# Patient Record
Sex: MALE | Race: WHITE | NOT HISPANIC OR LATINO | Employment: FULL TIME | URBAN - METROPOLITAN AREA
[De-identification: names, ages, dates, MRNs, and addresses within clinical notes are randomized per-mention and may not be internally consistent; named-entity substitution may affect disease eponyms.]

---

## 2017-01-31 ENCOUNTER — HOSPITAL ENCOUNTER (EMERGENCY)
Facility: CLINIC | Age: 22
Discharge: HOME | End: 2017-01-31
Attending: EMERGENCY MEDICINE | Admitting: EMERGENCY MEDICINE
Payer: COMMERCIAL

## 2017-01-31 VITALS
OXYGEN SATURATION: 99 % | DIASTOLIC BLOOD PRESSURE: 78 MMHG | RESPIRATION RATE: 16 BRPM | TEMPERATURE: 99.1 F | SYSTOLIC BLOOD PRESSURE: 127 MMHG

## 2017-01-31 DIAGNOSIS — R10.32 ABDOMINAL PAIN, LEFT LOWER QUADRANT: ICD-10-CM

## 2017-01-31 LAB
ALBUMIN SERPL-MCNC: 4.4 G/DL (ref 3.4–5)
ALBUMIN UR-MCNC: 10 MG/DL
ALP SERPL-CCNC: 61 U/L (ref 40–150)
ALT SERPL W P-5'-P-CCNC: 17 U/L (ref 0–70)
AMPHETAMINES UR QL SCN: ABNORMAL
ANION GAP SERPL CALCULATED.3IONS-SCNC: 6 MMOL/L (ref 3–14)
APPEARANCE UR: CLEAR
AST SERPL W P-5'-P-CCNC: 14 U/L (ref 0–45)
BARBITURATES UR QL: ABNORMAL
BASOPHILS # BLD AUTO: 0 10E9/L (ref 0–0.2)
BASOPHILS NFR BLD AUTO: 0.5 %
BENZODIAZ UR QL: ABNORMAL
BILIRUB SERPL-MCNC: 0.6 MG/DL (ref 0.2–1.3)
BILIRUB UR QL STRIP: NEGATIVE
BUN SERPL-MCNC: 6 MG/DL (ref 7–30)
CALCIUM SERPL-MCNC: 9.1 MG/DL (ref 8.5–10.1)
CANNABINOIDS UR QL SCN: ABNORMAL
CHLORIDE SERPL-SCNC: 106 MMOL/L (ref 94–109)
CO2 SERPL-SCNC: 26 MMOL/L (ref 20–32)
COCAINE UR QL: ABNORMAL
COLOR UR AUTO: YELLOW
CREAT SERPL-MCNC: 0.86 MG/DL (ref 0.66–1.25)
DIFFERENTIAL METHOD BLD: ABNORMAL
EOSINOPHIL # BLD AUTO: 0 10E9/L (ref 0–0.7)
EOSINOPHIL NFR BLD AUTO: 0.7 %
ERYTHROCYTE [DISTWIDTH] IN BLOOD BY AUTOMATED COUNT: 12.7 % (ref 10–15)
GFR SERPL CREATININE-BSD FRML MDRD: ABNORMAL ML/MIN/1.7M2
GLUCOSE SERPL-MCNC: 90 MG/DL (ref 70–99)
GLUCOSE UR STRIP-MCNC: NEGATIVE MG/DL
HCT VFR BLD AUTO: 41.6 % (ref 40–53)
HGB BLD-MCNC: 14.4 G/DL (ref 13.3–17.7)
HGB UR QL STRIP: ABNORMAL
IMM GRANULOCYTES # BLD: 0 10E9/L (ref 0–0.4)
IMM GRANULOCYTES NFR BLD: 0 %
KETONES UR STRIP-MCNC: 5 MG/DL
LEUKOCYTE ESTERASE UR QL STRIP: NEGATIVE
LYMPHOCYTES # BLD AUTO: 1.9 10E9/L (ref 0.8–5.3)
LYMPHOCYTES NFR BLD AUTO: 30.3 %
MCH RBC QN AUTO: 29.4 PG (ref 26.5–33)
MCHC RBC AUTO-ENTMCNC: 34.6 G/DL (ref 31.5–36.5)
MCV RBC AUTO: 85 FL (ref 78–100)
MONOCYTES # BLD AUTO: 1 10E9/L (ref 0–1.3)
MONOCYTES NFR BLD AUTO: 16.3 %
MUCOUS THREADS #/AREA URNS LPF: PRESENT /LPF
NEUTROPHILS # BLD AUTO: 3.2 10E9/L (ref 1.6–8.3)
NEUTROPHILS NFR BLD AUTO: 52.2 %
NITRATE UR QL: NEGATIVE
OPIATES UR QL SCN: ABNORMAL
PCP UR QL SCN: ABNORMAL
PH UR STRIP: 6.5 PH (ref 5–7)
PLATELET # BLD AUTO: 125 10E9/L (ref 150–450)
POTASSIUM SERPL-SCNC: 3.7 MMOL/L (ref 3.4–5.3)
PROT SERPL-MCNC: 7.4 G/DL (ref 6.8–8.8)
RBC # BLD AUTO: 4.9 10E12/L (ref 4.4–5.9)
RBC #/AREA URNS AUTO: 78 /HPF (ref 0–2)
SODIUM SERPL-SCNC: 138 MMOL/L (ref 133–144)
SP GR UR STRIP: 1.01 (ref 1–1.03)
URN SPEC COLLECT METH UR: ABNORMAL
UROBILINOGEN UR STRIP-MCNC: NORMAL MG/DL (ref 0–2)
WBC # BLD AUTO: 6.1 10E9/L (ref 4–11)
WBC #/AREA URNS AUTO: 1 /HPF (ref 0–2)

## 2017-01-31 PROCEDURE — 25000125 ZZHC RX 250: Performed by: EMERGENCY MEDICINE

## 2017-01-31 PROCEDURE — 87491 CHLMYD TRACH DNA AMP PROBE: CPT | Performed by: EMERGENCY MEDICINE

## 2017-01-31 PROCEDURE — 85025 COMPLETE CBC W/AUTO DIFF WBC: CPT | Performed by: EMERGENCY MEDICINE

## 2017-01-31 PROCEDURE — 81001 URINALYSIS AUTO W/SCOPE: CPT | Mod: XU | Performed by: EMERGENCY MEDICINE

## 2017-01-31 PROCEDURE — 99284 EMERGENCY DEPT VISIT MOD MDM: CPT | Mod: 25

## 2017-01-31 PROCEDURE — 87591 N.GONORRHOEAE DNA AMP PROB: CPT | Performed by: EMERGENCY MEDICINE

## 2017-01-31 PROCEDURE — 80053 COMPREHEN METABOLIC PANEL: CPT | Performed by: EMERGENCY MEDICINE

## 2017-01-31 PROCEDURE — 99285 EMERGENCY DEPT VISIT HI MDM: CPT | Performed by: EMERGENCY MEDICINE

## 2017-01-31 PROCEDURE — 96361 HYDRATE IV INFUSION ADD-ON: CPT

## 2017-01-31 PROCEDURE — 80307 DRUG TEST PRSMV CHEM ANLYZR: CPT | Performed by: EMERGENCY MEDICINE

## 2017-01-31 PROCEDURE — 96374 THER/PROPH/DIAG INJ IV PUSH: CPT

## 2017-01-31 PROCEDURE — 25000128 H RX IP 250 OP 636: Performed by: EMERGENCY MEDICINE

## 2017-01-31 RX ORDER — HYDROMORPHONE HYDROCHLORIDE 1 MG/ML
0.5 INJECTION, SOLUTION INTRAMUSCULAR; INTRAVENOUS; SUBCUTANEOUS EVERY 30 MIN PRN
Status: DISCONTINUED | OUTPATIENT
Start: 2017-01-31 | End: 2017-01-31 | Stop reason: HOSPADM

## 2017-01-31 RX ORDER — ONDANSETRON 2 MG/ML
4 INJECTION INTRAMUSCULAR; INTRAVENOUS
Status: DISCONTINUED | OUTPATIENT
Start: 2017-01-31 | End: 2017-01-31 | Stop reason: HOSPADM

## 2017-01-31 RX ORDER — KETOROLAC TROMETHAMINE 30 MG/ML
30 INJECTION, SOLUTION INTRAMUSCULAR; INTRAVENOUS ONCE
Status: COMPLETED | OUTPATIENT
Start: 2017-01-31 | End: 2017-01-31

## 2017-01-31 RX ADMIN — KETOROLAC TROMETHAMINE 30 MG: 30 INJECTION, SOLUTION INTRAMUSCULAR at 10:11

## 2017-01-31 RX ADMIN — SODIUM CHLORIDE 500 ML: 9 INJECTION, SOLUTION INTRAVENOUS at 10:10

## 2017-01-31 ASSESSMENT — ENCOUNTER SYMPTOMS
ABDOMINAL PAIN: 1
MUSCULOSKELETAL NEGATIVE: 1
HEMATOLOGIC/LYMPHATIC NEGATIVE: 1
CARDIOVASCULAR NEGATIVE: 1
EYES NEGATIVE: 1
NEUROLOGICAL NEGATIVE: 1
RESPIRATORY NEGATIVE: 1
HEMATURIA: 1
CONSTITUTIONAL NEGATIVE: 1
PSYCHIATRIC NEGATIVE: 1
ALLERGIC/IMMUNOLOGIC NEGATIVE: 1
ENDOCRINE NEGATIVE: 1

## 2017-01-31 NOTE — ED AVS SNAPSHOT
[facility]    [address] [facility] [facility] [address]    Phone:  [phone]    Fax:  [phone]                                       Hemanth Hurd   MRN: [id number]    Department:  [facility]   Date of Visit:  1/31/2017           After Visit Summary Signature Page     I have received my discharge instructions, and my questions have been answered. I have discussed any challenges I see with this plan with the nurse or doctor.    ..........................................................................................................................................  Patient/Patient Representative Signature      ..........................................................................................................................................  Patient Representative Print Name and Relationship to Patient    ..................................................               ................................................  Date                                            Time    ..........................................................................................................................................  Reviewed by Signature/Title    ...................................................              ..............................................  Date                                                            Time

## 2017-01-31 NOTE — ED AVS SNAPSHOT
[facility]    [address] [facility] [address]    Phone:  [phone]    Fax:  [phone]                                       Hemanth Hurd   MRN: [id number]    Department:  [facility]   Date of Visit:  1/31/2017           Patient Information     Date Of Birth          1995        Your diagnoses for this visit were:     Abdominal pain, left lower quadrant        You were seen by Bill Danielson MD.      Follow-up Information     Follow up with [facility].    Specialty:  EMERGENCY MEDICINE    Why:  You will be called by the ED follow-up  nurses if positive urine for infection.    Contact information:    [address] [place] [address] [id number] [phone]    Additional information:    The medical center is located at   [address] [place] [address] (between [facility] and   [address], [place] north   [facility]).        Follow up with [facility] In 3 days.    Specialty:  Urology    Why:  As needed, If symptoms worsen for follow-up for abdominal pain and blood in urine    Contact information:    [address] [place] [address] [phone]    Additional information:    The medical center is located at   [address] [place] [address] (between [facility] and   [address], [place] miles north   of [place]).        Follow up with [facility].    Specialty:  EMERGENCY MEDICINE    Why:  if fever, worsening pain, vomiting, new notice of blood in urine    Contact information:    [address] [place] [address] [phone]    Additional information:    The medical center is located at   [address] [place] [address] (between [facility] and   [address], four miles north   [facility]).        Discharge Instructions         * Abdominal Pain,Uncertain Cause [Male]  Based on your visit today, the exact cause of your abdominal  (stomach) pain is not clear. Your exam and tests do not indicate a dangerous cause at this time. However, the signs of a serious problem may take more time to appear. Although your exam was reassuring today, sometimes early in the course of many conditions, exam and lab tests can appear normal. Therefore, it is important for you to watch for any new symptoms or worsening of your condition.  Causes:  It may not be obvious what caused your symptoms. Pay attention to things that do seem to make your symptoms worse or better and discuss this with your doctor when you follow up.  Diagnosis:  The evaluation of abdominal pain in the emergency department may only require an exam by the doctor or it may include blood, urine or imaging studies, depending on many factors. Sometimes exams and tests can identify a cause but in many cases, a clear cause is not found. Further testing at follow up visits may help to suggest a clear diagnosis.  Home Care:    Rest as much as possible until your next exam.    Try to avoid any medications (unless otherwise directed by your doctor), foods, activities, or other factors that you may have contributed to your symptoms.    Try to eat foods that you know that you have tolerated well in the past. Certain diets may be recommended for some conditions that cause abdominal pain. However, since the cause of your symptoms may not be clear, discuss your diet more with your primary care provider or specialist for further recommendations.     Eating several small meals per day as opposed to 2 or 3 larger meals may help.    Monitor closely for anything that may make your symptoms worse or better. Pay close attention to symptoms below that may indicate worsening of your condition.  Follow Up And Precautions:  See your doctor or this facility as instructed (or sooner, if your symptoms are not improving). In some cases, you may need more testing.  Contact Your Doctor Or Seek Medical Attention  if any of  the following occur:    Pain is becoming worse    You are unable to take your medications because of too much vomiting    Swelling of the abdomen    Fever of 101 F (38.3 C) or higher, or as directed by your health care provider    Blood in vomit or bowel movements (dark red or black color)    Jaundice (yellow color of eyes and skin)    New onset of weakness, dizziness or fainting    New onset of chest, arm, back, neck or jaw pain    [id number] [facility], [address] [place] [address], [place] [address]. All rights reserved. This information is not intended as a substitute for professional medical care. Always follow your healthcare professional's instructions.          24 Hour Appointment Hotline       To make an appointment at any [facility], call [phone] ([phone]). If you don't have a family doctor or clinic, we will help you find one. [facility] clinics are conveniently located to serve the needs of you and your family.             Review of your medicines      Our records show that you are taking the medicines listed below. If these are incorrect, please call your family doctor or clinic.        Dose / Directions Last dose taken    ATIVAN PO   Dose:  0.5 mg        Take 0.5 mg by mouth every 6 hours as needed for anxiety   Refills:  0        EFFEXOR XR PO        Take by mouth daily (with breakfast)   Refills:  0                Procedures and tests performed during your visit     CBC with platelets differential    Chlamydia trachomatis PCR    Comprehensive metabolic panel    Drug abuse screen 77 urine (FL, RH, SH)    Neisseria gonorrhoea PCR    POC US RETROPERITONEAL LIMITED    UA reflex to Microscopic      Orders Needing Specimen Collection     None      Pending Results     Date and Time Order Name Status Description    1/31/2017 0951 Chlamydia trachomatis PCR In process     1/31/2017 0951 Neisseria gonorrhoea PCR In process     1/31/2017 0935 POC US RETROPERITONEAL LIMITED In process              Pending Culture Results     Date and Time Order Name Status Description    1/31/2017 0951 Chlamydia trachomatis PCR In process     1/31/2017 0951 Neisseria gonorrhoea PCR In process        Test Results from your hospital stay           1/31/2017 10:04 AM - Interface, Flexilab Results      Component Results     Component Value Ref Range & Units Status    Color Urine Yellow  Final    Appearance Urine Clear  Final    Glucose Urine Negative NEG mg/dL Final    Bilirubin Urine Negative NEG Final    Ketones Urine 5 (A) NEG mg/dL Final    Specific Gravity Urine 1.015 1.003 - 1.035 Final    Blood Urine Small (A) NEG Final    pH Urine 6.5 5.0 - 7.0 pH Final    Protein Albumin Urine 10 (A) NEG mg/dL Final    Urobilinogen mg/dL Normal 0.0 - 2.0 mg/dL Final    Nitrite Urine Negative NEG Final    Leukocyte Esterase Urine Negative NEG Final    Source Midstream Urine  Final    RBC Urine 78 (H) 0 - 2 /HPF Final    WBC Urine 1 0 - 2 /HPF Final    Mucous Urine Present (A) NEG /LPF Final         1/31/2017 10:40 AM - Interface, Flexilab Results      Component Results     Component Value Ref Range & Units Status    Sodium 138 133 - 144 mmol/L Final    Potassium 3.7 3.4 - 5.3 mmol/L Final    Chloride 106 94 - 109 mmol/L Final    Carbon Dioxide 26 20 - 32 mmol/L Final    Anion Gap 6 3 - 14 mmol/L Final    Glucose 90 70 - 99 mg/dL Final    Urea Nitrogen 6 (L) 7 - 30 mg/dL Final    Creatinine 0.86 0.66 - 1.25 mg/dL Final    GFR Estimate >90  Non  GFR Calc   >60 mL/min/1.7m2 Final    GFR Estimate If Black >90   GFR Calc   >60 mL/min/1.7m2 Final    Calcium 9.1 8.5 - 10.1 mg/dL Final    Bilirubin Total 0.6 0.2 - 1.3 mg/dL Final    Albumin 4.4 3.4 - 5.0 g/dL Final    Protein Total 7.4 6.8 - 8.8 g/dL Final    Alkaline Phosphatase 61 40 - 150 U/L Final    ALT 17 0 - 70 U/L Final    AST 14 0 - 45 U/L Final         1/31/2017 10:50 AM - Interface, Flexilab Results      Component Results     Component Value Ref Range  & Units Status    WBC 6.1 4.0 - 11.0 10e9/L Final    RBC Count 4.90 4.4 - 5.9 10e12/L Final    Hemoglobin 14.4 13.3 - 17.7 g/dL Final    Hematocrit 41.6 40.0 - 53.0 % Final    MCV 85 78 - 100 fl Final    MCH 29.4 26.5 - 33.0 pg Final    MCHC 34.6 31.5 - 36.5 g/dL Final    RDW 12.7 10.0 - 15.0 % Final    Platelet Count 125 (L) 150 - 450 10e9/L Final    Diff Method Automated Method  Final    % Neutrophils 52.2 % Final    % Lymphocytes 30.3 % Final    % Monocytes 16.3 % Final    % Eosinophils 0.7 % Final    % Basophils 0.5 % Final    % Immature Granulocytes 0.0 % Final    Absolute Neutrophil 3.2 1.6 - 8.3 10e9/L Final    Absolute Lymphocytes 1.9 0.8 - 5.3 10e9/L Final    Absolute Monocytes 1.0 0.0 - 1.3 10e9/L Final    Absolute Eosinophils 0.0 0.0 - 0.7 10e9/L Final    Absolute Basophils 0.0 0.0 - 0.2 10e9/L Final    Abs Immature Granulocytes 0.0 0 - 0.4 10e9/L Final         1/31/2017  9:35 AM - Service Account, [facility] [place]         1/31/2017  9:54 AM - Interface, Flexilab Results         1/31/2017  9:54 AM - Interface, Flexilab Results         1/31/2017 10:14 AM - Interface, Flexilab Results      Component Results     Component Value Ref Range & Units Status    Amphetamine Qual Urine  NEG Final    Negative   Cutoff for a negative amphetamine is 500 ng/mL or less.      Barbiturates Qual Urine  NEG Final    Negative   Cutoff for a negative barbiturate is 200 ng/mL or less.      Benzodiazepine Qual Urine  NEG Final    Negative   Cutoff for a negative benzodiazepine is 200 ng/mL or less.      Cannabinoids Qual Urine  NEG Final    Negative   Cutoff for a negative cannabinoid is 50 ng/mL or less.      Cocaine Qual Urine  NEG Final    Negative   Cutoff for a negative cocaine is 300 ng/mL or less.      Opiates Qualitative Urine  NEG Final    Positive   Cutoff for a positive opiate is greater than 300 ng/mL. This is an unconfirmed   screening result to be used for medical purposes only.   (A)    PCP Qual Urine  NEG Final     "Negative   Cutoff for a negative PCP is 25 ng/mL or less.                  Thank you for choosing [facility]       Thank you for choosing [facility] for your care. Our goal is always to provide you with excellent care. Hearing back from our patients is one way we can continue to improve our services. Please take a few minutes to complete the written survey that you may receive in the mail after you visit with us. Thank you!        [facility] Information     [employer] lets you send messages to your doctor, view your test results, renew your prescriptions, schedule appointments and more. To sign up, go to [email or web address] . Click on \"Log in\" on the left side of the screen, which will take you to the Welcome page. Then click on \"Sign up Now\" on the right side of the page.     You will be asked to enter the access code listed below, as well as some personal information. Please follow the directions to create your username and password.     Your access code is: [email or web address]  Expires: 2017 12:36 PM     Your access code will  in 90 days. If you need help or a new code, please call your [facility] clinic or [phone].        Care EveryWhere ID     This is your Care EveryWhere ID. This could be used by other organizations to access your [facility] medical records  [id number]        After Visit Summary       This is your record. Keep this with you and show to your community pharmacist(s) and doctor(s) at your next visit.                  "

## 2017-01-31 NOTE — ED NOTES
"Pt not satisfied with his care, verbalized by angry words. Stating that \"you guys did nothing for my pain.\" discharged with instructions.   "

## 2017-01-31 NOTE — DISCHARGE INSTRUCTIONS
* Abdominal Pain,Uncertain Cause [Male]  Based on your visit today, the exact cause of your abdominal (stomach) pain is not clear. Your exam and tests do not indicate a dangerous cause at this time. However, the signs of a serious problem may take more time to appear. Although your exam was reassuring today, sometimes early in the course of many conditions, exam and lab tests can appear normal. Therefore, it is important for you to watch for any new symptoms or worsening of your condition.  Causes:  It may not be obvious what caused your symptoms. Pay attention to things that do seem to make your symptoms worse or better and discuss this with your doctor when you follow up.  Diagnosis:  The evaluation of abdominal pain in the emergency department may only require an exam by the doctor or it may include blood, urine or imaging studies, depending on many factors. Sometimes exams and tests can identify a cause but in many cases, a clear cause is not found. Further testing at follow up visits may help to suggest a clear diagnosis.  Home Care:    Rest as much as possible until your next exam.    Try to avoid any medications (unless otherwise directed by your doctor), foods, activities, or other factors that you may have contributed to your symptoms.    Try to eat foods that you know that you have tolerated well in the past. Certain diets may be recommended for some conditions that cause abdominal pain. However, since the cause of your symptoms may not be clear, discuss your diet more with your primary care provider or specialist for further recommendations.     Eating several small meals per day as opposed to 2 or 3 larger meals may help.    Monitor closely for anything that may make your symptoms worse or better. Pay close attention to symptoms below that may indicate worsening of your condition.  Follow Up And Precautions:  See your doctor or this facility as instructed (or sooner, if your symptoms are not  improving). In some cases, you may need more testing.  Contact Your Doctor Or Seek Medical Attention  if any of the following occur:    Pain is becoming worse    You are unable to take your medications because of too much vomiting    Swelling of the abdomen    Fever of 101 F (38.3 C) or higher, or as directed by your health care provider    Blood in vomit or bowel movements (dark red or black color)    Jaundice (yellow color of eyes and skin)    New onset of weakness, dizziness or fainting    New onset of chest, arm, back, neck or jaw pain    [phone] [address] [address]. All rights reserved. This information is not intended as a substitute for professional medical care. Always follow your healthcare professional's instructions.

## 2017-01-31 NOTE — ED PROVIDER NOTES
History     Chief Complaint   Patient presents with     Abdominal Pain     Pt started having abdominal pain last evening at 9 pm.  Seen in [facility] during the night, they thought he may have passed a kidney stone.  After meds wore off, pain returned.  Nausea when pain is severe.  Denies diarrhea.  Having blood in urine during the night.  No void this morning      HPI  Hemanth Hurd is a 21 year old male who presented for evaluation for abdominal pain and discomfort.  Patient was seen at the [facility] last night and diagnosed with a probable stone based on hematuria and abdominal pain.  Patient did have CT imaging we did not show discrete stone or hydronephrosis.  There was mild sigmoid diverticulosis without diverticulitis on CT imaging and there was no hydroureter  noted.  patient reports he is doing well after he was discharged but returns today with worsening pain and discomfort because he feels the pain medications wore off.  Nursing notes indicates patient reported he had difficulty voiding with concern for urinary retention but on my exam and during my HPI patient reports he's had no difficulty voiding.  He reports no prior history of sexually transmitted infections.  He reports no personal history of kidney stone but a family history of kidney stones.   He reports he sexually active with women.  He has one single sexual partner.  He has not been sexually active for about 3 months.  He reports no penile discharge.  No back pain or flank pain.  Pain is predominantly over the left lower abdomen.  Because of concern for worsening pain with recent evaluation at [facility] and presumed diagnosis of possible passing of a small urinary stone with hematuria who presents to the emergency department here for further evaluation and care.      Social history: Lives in [place], [place]. Here in ED alone by private car. Smoker- 1ppd. Works at a car dealership. Student at [employer]    Past Medical  history: no active medical problems.    Medications:  No current facility-administered medications for this encounter.     Current Outpatient Prescriptions   Medication     Venlafaxine HCl (EFFEXOR XR PO)     LORazepam (ATIVAN PO)     Allergies:   No Known Allergies  I have reviewed the Medications, Allergies, Past Medical and Surgical History, and Social History in the Epic system.    Review of Systems   Constitutional: Negative.    HENT: Negative.    Eyes: Negative.    Respiratory: Negative.    Cardiovascular: Negative.    Gastrointestinal: Positive for abdominal pain.   Endocrine: Negative.    Genitourinary: Positive for hematuria.   Musculoskeletal: Negative.    Skin: Negative.    Allergic/Immunologic: Negative.    Neurological: Negative.    Hematological: Negative.    Psychiatric/Behavioral: Negative.        Physical Exam   BP: (!) 135/92 mmHg  Heart Rate: 104  Temp: 99.1  F (37.3  C)  Resp: 16  SpO2: 99 %  Physical Exam   Constitutional: He is oriented to person, place, and time. He appears well-developed and well-nourished. No distress.   HENT:   Head: Normocephalic and atraumatic.   Eyes: Conjunctivae and EOM are normal. Pupils are equal, round, and reactive to light. Right eye exhibits no discharge. Left eye exhibits no discharge. No scleral icterus.   Neck: Normal range of motion. Neck supple. No JVD present. No tracheal deviation present. No thyromegaly present.   Cardiovascular: Normal rate and regular rhythm.  Exam reveals no gallop and no friction rub.    No murmur heard.  Pulmonary/Chest: Effort normal and breath sounds normal. No stridor. No respiratory distress. He has no wheezes. He has no rales. He exhibits no tenderness.   Abdominal: Soft. He exhibits no mass. There is tenderness in the left lower quadrant. There is no rebound and no guarding.       Lymphadenopathy:     He has no cervical adenopathy.   Neurological: He is alert and oriented to person, place, and time. He displays normal reflexes.  No cranial nerve deficit. He exhibits normal muscle tone. Coordination normal.   Skin: No rash noted. He is not diaphoretic. No erythema. No pallor.   Psychiatric: He has a normal mood and affect. His behavior is normal. Judgment and thought content normal.       ED Course   Procedures          Critical Care time:  none                 ED medications:  Medications   0.9% sodium chloride BOLUS (0 mLs Intravenous Stopped 1/31/17 1219)   ketorolac (TORADOL) injection 30 mg (30 mg Intravenous Given 1/31/17 1011)     ED labs and imaging:  Results for orders placed or performed during the hospital encounter of 01/31/17   UA reflex to Microscopic   Result Value Ref Range    Color Urine Yellow     Appearance Urine Clear     Glucose Urine Negative NEG mg/dL    Bilirubin Urine Negative NEG    Ketones Urine 5 (A) NEG mg/dL    Specific Gravity Urine 1.015 1.003 - 1.035    Blood Urine Small (A) NEG    pH Urine 6.5 5.0 - 7.0 pH    Protein Albumin Urine 10 (A) NEG mg/dL    Urobilinogen mg/dL Normal 0.0 - 2.0 mg/dL    Nitrite Urine Negative NEG    Leukocyte Esterase Urine Negative NEG    Source Midstream Urine     RBC Urine 78 (H) 0 - 2 /HPF    WBC Urine 1 0 - 2 /HPF    Mucous Urine Present (A) NEG /LPF   Comprehensive metabolic panel   Result Value Ref Range    Sodium 138 133 - 144 mmol/L    Potassium 3.7 3.4 - 5.3 mmol/L    Chloride 106 94 - 109 mmol/L    Carbon Dioxide 26 20 - 32 mmol/L    Anion Gap 6 3 - 14 mmol/L    Glucose 90 70 - 99 mg/dL    Urea Nitrogen 6 (L) 7 - 30 mg/dL    Creatinine 0.86 0.66 - 1.25 mg/dL    GFR Estimate >90  Non  GFR Calc   >60 mL/min/1.7m2    GFR Estimate If Black >90   GFR Calc   >60 mL/min/1.7m2    Calcium 9.1 8.5 - 10.1 mg/dL    Bilirubin Total 0.6 0.2 - 1.3 mg/dL    Albumin 4.4 3.4 - 5.0 g/dL    Protein Total 7.4 6.8 - 8.8 g/dL    Alkaline Phosphatase 61 40 - 150 U/L    ALT 17 0 - 70 U/L    AST 14 0 - 45 U/L   CBC with platelets differential   Result Value Ref Range     WBC 6.1 4.0 - 11.0 10e9/L    RBC Count 4.90 4.4 - 5.9 10e12/L    Hemoglobin 14.4 13.3 - 17.7 g/dL    Hematocrit 41.6 40.0 - 53.0 %    MCV 85 78 - 100 fl    MCH 29.4 26.5 - 33.0 pg    MCHC 34.6 31.5 - 36.5 g/dL    RDW 12.7 10.0 - 15.0 %    Platelet Count 125 (L) 150 - 450 10e9/L    Diff Method Automated Method     % Neutrophils 52.2 %    % Lymphocytes 30.3 %    % Monocytes 16.3 %    % Eosinophils 0.7 %    % Basophils 0.5 %    % Immature Granulocytes 0.0 %    Absolute Neutrophil 3.2 1.6 - 8.3 10e9/L    Absolute Lymphocytes 1.9 0.8 - 5.3 10e9/L    Absolute Monocytes 1.0 0.0 - 1.3 10e9/L    Absolute Eosinophils 0.0 0.0 - 0.7 10e9/L    Absolute Basophils 0.0 0.0 - 0.2 10e9/L    Abs Immature Granulocytes 0.0 0 - 0.4 10e9/L   Drug abuse screen 77 urine (FL, RH, SH)   Result Value Ref Range    Amphetamine Qual Urine  NEG     Negative   Cutoff for a negative amphetamine is 500 ng/mL or less.      Barbiturates Qual Urine  NEG     Negative   Cutoff for a negative barbiturate is 200 ng/mL or less.      Benzodiazepine Qual Urine  NEG     Negative   Cutoff for a negative benzodiazepine is 200 ng/mL or less.      Cannabinoids Qual Urine  NEG     Negative   Cutoff for a negative cannabinoid is 50 ng/mL or less.      Cocaine Qual Urine  NEG     Negative   Cutoff for a negative cocaine is 300 ng/mL or less.      Opiates Qualitative Urine (A) NEG     Positive   Cutoff for a positive opiate is greater than 300 ng/mL. This is an unconfirmed   screening result to be used for medical purposes only.      PCP Qual Urine  NEG     Negative   Cutoff for a negative PCP is 25 ng/mL or less.         ED Vitals:  Filed Vitals:    01/31/17 1130 01/31/17 1145 01/31/17 1200 01/31/17 1215   BP: 127/82  127/78    Temp:       TempSrc:       Resp:       SpO2: 96% 98%  99%     Prior or recent diagnostic imaging and work-up:  CONCLUSION:    1.  No hydronephrosis or hydroureter.    2.  No renal or distal obstructing ureteral calculi.    3.  No CT evidence  of appendicitis.    4.  No focal inflammatory change.    5.  Mild sigmoid diverticulosis.                   CT Abdomen Pelvis Without Oral Without IV Contrast (01/31/2017 5:27 AM)   Narrative   CT ABDOMEN PELVIS WO ORAL WO IV CONTRAST    1/31/2017 5:27 AM        INDICATION: Abdominal pain. Hematuria.    TECHNIQUE: Routine CT abdomen and pelvis without oral or IV contrast. Multiplanar reformation images (MPR). Dose reduction techniques were used.    COMPARISON: None.        FINDINGS:    LUNG BASES: Negative.        ABDOMEN: The liver, spleen and pancreas unremarkable. Stomach partially filled with gastric contents. Gallbladder visualized. No adrenal nodule. Aorta normal in caliber. No hydronephrosis. No renal lithiasis. No proximal hydroureter.        PELVIS: The appendix is partially air-filled. The distal appendix contains intraluminal hyperdensity compatible with ingested hyperdense material. No focal inflammatory change. Bladder is partially filled measuring 7.9 x 8.6 cm. No distal hydroureter. No    distal obstructing ureteral calculi. Minimal phleboliths in the pelvis. Mild sigmoid diverticulosis. No free fluid. No focal inflammatory change.        MUSCULOSKELETAL: Image 366 series 4 demonstrates 6 mm nonspecific area of sclerosis left inferior pubic ramus.          Assessments & Plan (with Medical Decision Making)   Clinical impression: 21-year-old male who presented for concern for left lower quadrant abdominal pain and discomfort.  The exact cause of his pain and discomfort is unclear.  He has a history of anxiety and takes Effexor and lorazepam.  Presented with ongoing pain after recent ED visit at an outside ED at [facility] (<24 hours earlier). No fever, no prior history of urinary stones personally.  Father with history of kidney stone.  Sexually active.  Last sexual encounter 3 months ago.  No prior history of sexual transmitted infections.  No diarrhea, no history of abdominal  surgeries, no back pain or flank pain.   exam was deferred as patient had no urinary complaints specifically no penile discharge, or rash or lesions and he does not report any burning with urination.  Abdomen soft with mild discomfort in the left lower quadrant.  No organomegaly no rebound and no guarding.      ED course and Plan:   I reviewed his emergency department encounter at [facility] at [facility] in [place] last night.  See detailed report of the CT imaging completed yesterday above.  It appears he was treated with IV Toradol and Dilaudid.  He had a normal renal function.  Mid stream  urinalysis did show large blood but no evidence for infection.  We had a discussion about options for diagnostic workup and evaluation.  He rated his abdominal pain is 7 out of 10.  He was given a dose of IV Toradol.  I considered a renal ultrasound and urinary ultrasound given reports of urinary retention per nursing notes but on my exam patient did not have any urinary retention and actually voided spontaneously without any complaints of pain or discomfort.    A  exam was deferred as he reports no penile discharge no penile lesions no ongoing hematuria and no prior history of STDs.  He has normal labs today including normal renal function negative urine drug screen and urinalysis shows small microscopic hematuria.  The urinalysis was sent for gonorrhea and chlamydia which is pending.  Because he has no concerns for sexually transmitted infection he was not treated empirically and his results are pending.  He will be followed up by the ED follow-up nurses.  His presentation is not consistent with urethritis as he has no primary  complaints and his primary complaint is of left lower quadrant abdominal pain.  With a recent CT completed less than 24 hours ago I did not feel additional imaging was medically necessary given normal labs and unremarkable exam in an otherwise healthy young adult.     Patient is  discharged to home with left lower quadrant abdominal pain of unclear cause.  He is to call the urology clinic for follow-up for possible cystoscopy given small microscopic hematuria on urinalysis in the ED today with no urinary stone on outside CT imaging last night.  Return to the ED if you develop fever or worsening pain.  He recalled by the ED follow-up nurses if his urine is positive for concern for chlamydia and gonorrhea.  Patient left somewhat upset that his pain was not completely resolved and the cause of his pain was not completely elucidated.  He inquired about what he should do for pain management, I recommended over-the-counter pain medication including Tylenol, ibuprofen, Motrin as needed.        Disclaimer: This note consists of symbols derived from keyboarding, dictation and/or voice recognition software. As a result, there may be errors in the script that have gone undetected. Please consider this when interpreting information found in this chart.  I have reviewed the nursing notes.    I have reviewed the findings, diagnosis, plan and need for follow up with the patient.    Discharge Medication List as of 1/31/2017 12:36 PM          Final diagnoses:   Abdominal pain, left lower quadrant       1/31/2017   [facility] EMERGENCY DEPARTMENT      Bill Danielson MD  01/31/17 6497

## 2017-01-31 NOTE — ED NOTES
Pt started having abdominal pain last evening at 9 pm.  Seen in [facility] during the night, they thought he may have passed a kidney stone.  After meds wore off, pain returned.  Nausea when pain is severe.  Denies diarrhea.  Having blood in urine during the night.  No void this morning

## 2017-02-01 LAB
C TRACH DNA SPEC QL NAA+PROBE: NORMAL
N GONORRHOEA DNA SPEC QL NAA+PROBE: NORMAL
SPECIMEN SOURCE: NORMAL
SPECIMEN SOURCE: NORMAL

## 2017-02-03 ENCOUNTER — TELEPHONE (OUTPATIENT)
Dept: EMERGENCY MEDICINE | Facility: CLINIC | Age: 22
End: 2017-02-03

## 2017-02-03 NOTE — TELEPHONE ENCOUNTER
[facility] [facility] Emergency Department Lab result notification     Patient/parent Name  Hemanth Hurd    Reason for call  Calling for gonorrhea/ chlamydia test results    Lab Result  Final result for both N. Gonorrhoeae PCR and Chlamydia Trachomatis PCR are NEGATIVE.  No treatment or change in treatment per [facility] ED Lab Result protocol.    Contact your PCP clinic or return to the Emergency department if your:    Symptoms return.    Symptoms worsen or other concerning symptom's.    Shu Luther RN    [facility] RN  Lung Nodule and ED Lab Results F/U RN  Epic pool (ED late result f/u RN) : P [id number]   # [phone]    Copy of Lab result   Order    Neisseria gonorrhoea PCR [[id number]] (Order [id number])         Exam Information      Exam Date Exam Time Accession # Results      1/31/17  9:40 AM [id number]        Component Results      Component Value Ref Range & Units Status     Specimen Descrip   Corrected     Urine   CORRECTED ON 02/01 AT 0657: PREVIOUSLY REPORTED AS Midstream Urine        N Gonorrhea PCR  NEG Final     Negative    Negative for N. gonorrhoeae rRNA by transcription mediated amplification.    A negative result by transcription mediated amplification does not preclude the    presence of N. gonorrhoeae infection because results are dependent on proper    and adequate collection, absence of inhibitors, and sufficient rRNA to be    detected.        Order    Chlamydia trachomatis PCR [[id number]] (Order [id number])         Exam Information      Exam Date Exam Time Accession # Results      1/31/17  9:40 AM [id number]        Component Results      Component Value Ref Range & Units Status     Specimen Description   Corrected     Urine   CORRECTED ON 02/01 AT 0657: PREVIOUSLY REPORTED AS Midstream Urine        Chlamydia Trachomatis PCR  NEG Final     Negative    Negative for C. trachomatis rRNA by transcription mediated amplification.    A negative result by transcription  mediated amplification does not preclude the    presence of C. trachomatis infection because results are dependent on proper    and adequate collection, absence of inhibitors, and sufficient rRNA to be    detected.

## 2021-06-15 PROBLEM — R10.9 ABDOMINAL PAIN: Status: ACTIVE | Noted: 2017-01-31

## 2021-06-15 PROBLEM — R31.9 HEMATURIA: Status: ACTIVE | Noted: 2017-01-31

## 2022-07-12 ENCOUNTER — HOSPITAL ENCOUNTER (EMERGENCY)
Facility: CLINIC | Age: 27
Discharge: LEFT BEFORE DISCHARGE | End: 2022-07-12

## 2022-07-12 VITALS
HEART RATE: 77 BPM | DIASTOLIC BLOOD PRESSURE: 103 MMHG | HEIGHT: 73 IN | WEIGHT: 180 LBS | RESPIRATION RATE: 18 BRPM | TEMPERATURE: 97.2 F | SYSTOLIC BLOOD PRESSURE: 154 MMHG | OXYGEN SATURATION: 98 % | BODY MASS INDEX: 23.86 KG/M2

## 2022-07-12 NOTE — ED TRIAGE NOTES
Patient seen in Urgent Care for alcohol withdrawal.  Told to follow up with primary care.  EKG normal at  per patient.  Feels shaky  Last drink 26 hours ago      Triage Assessment     Row Name 07/12/22 1024       Triage Assessment (Adult)    Airway WDL WDL       Respiratory WDL    Respiratory WDL WDL       Skin Circulation/Temperature WDL    Skin Circulation/Temperature WDL WDL       Cardiac WDL    Cardiac WDL WDL       Peripheral/Neurovascular WDL    Peripheral Neurovascular WDL WDL       Cognitive/Neuro/Behavioral WDL    Cognitive/Neuro/Behavioral WDL WDL

## 2023-07-06 ENCOUNTER — OFFICE VISIT (OUTPATIENT)
Dept: FAMILY MEDICINE | Facility: CLINIC | Age: 28
End: 2023-07-06
Payer: COMMERCIAL

## 2023-07-06 VITALS
HEIGHT: 72 IN | RESPIRATION RATE: 16 BRPM | OXYGEN SATURATION: 98 % | BODY MASS INDEX: 23 KG/M2 | TEMPERATURE: 97.7 F | WEIGHT: 169.8 LBS | SYSTOLIC BLOOD PRESSURE: 140 MMHG | HEART RATE: 85 BPM | DIASTOLIC BLOOD PRESSURE: 90 MMHG

## 2023-07-06 DIAGNOSIS — F41.1 GAD (GENERALIZED ANXIETY DISORDER): ICD-10-CM

## 2023-07-06 DIAGNOSIS — Z11.3 SCREEN FOR STD (SEXUALLY TRANSMITTED DISEASE): ICD-10-CM

## 2023-07-06 DIAGNOSIS — F32.1 CURRENT MODERATE EPISODE OF MAJOR DEPRESSIVE DISORDER, UNSPECIFIED WHETHER RECURRENT (H): ICD-10-CM

## 2023-07-06 DIAGNOSIS — Z23 ENCOUNTER FOR VACCINATION: ICD-10-CM

## 2023-07-06 DIAGNOSIS — F10.11 HISTORY OF ALCOHOL ABUSE: ICD-10-CM

## 2023-07-06 DIAGNOSIS — D64.9 LOW HEMOGLOBIN: ICD-10-CM

## 2023-07-06 DIAGNOSIS — Z87.898 HISTORY OF BENZODIAZEPINE USE: ICD-10-CM

## 2023-07-06 DIAGNOSIS — R60.0 SALIVARY GLAND SWELLING: Primary | ICD-10-CM

## 2023-07-06 DIAGNOSIS — F11.11 HX OF OPIOID ABUSE (H): ICD-10-CM

## 2023-07-06 DIAGNOSIS — L05.91 PILONIDAL CYST: ICD-10-CM

## 2023-07-06 PROBLEM — R31.9 HEMATURIA: Status: RESOLVED | Noted: 2017-01-31 | Resolved: 2023-07-06

## 2023-07-06 PROCEDURE — 87591 N.GONORRHOEAE DNA AMP PROB: CPT | Performed by: NURSE PRACTITIONER

## 2023-07-06 PROCEDURE — 90472 IMMUNIZATION ADMIN EACH ADD: CPT | Performed by: NURSE PRACTITIONER

## 2023-07-06 PROCEDURE — 99204 OFFICE O/P NEW MOD 45 MIN: CPT | Mod: 25 | Performed by: NURSE PRACTITIONER

## 2023-07-06 PROCEDURE — 90651 9VHPV VACCINE 2/3 DOSE IM: CPT | Performed by: NURSE PRACTITIONER

## 2023-07-06 PROCEDURE — 90715 TDAP VACCINE 7 YRS/> IM: CPT | Performed by: NURSE PRACTITIONER

## 2023-07-06 PROCEDURE — 96127 BRIEF EMOTIONAL/BEHAV ASSMT: CPT | Mod: 59 | Performed by: NURSE PRACTITIONER

## 2023-07-06 PROCEDURE — 87491 CHLMYD TRACH DNA AMP PROBE: CPT | Performed by: NURSE PRACTITIONER

## 2023-07-06 PROCEDURE — 90471 IMMUNIZATION ADMIN: CPT | Performed by: NURSE PRACTITIONER

## 2023-07-06 RX ORDER — QUETIAPINE FUMARATE 50 MG/1
25 TABLET, FILM COATED ORAL AT BEDTIME
COMMUNITY
End: 2023-07-27

## 2023-07-06 RX ORDER — SERTRALINE HYDROCHLORIDE 25 MG/1
TABLET, FILM COATED ORAL
Qty: 53 TABLET | Refills: 0 | Status: SHIPPED | OUTPATIENT
Start: 2023-07-06 | End: 2023-08-23

## 2023-07-06 ASSESSMENT — ANXIETY QUESTIONNAIRES
1. FEELING NERVOUS, ANXIOUS, OR ON EDGE: SEVERAL DAYS
6. BECOMING EASILY ANNOYED OR IRRITABLE: NEARLY EVERY DAY
GAD7 TOTAL SCORE: 12
5. BEING SO RESTLESS THAT IT IS HARD TO SIT STILL: NEARLY EVERY DAY
7. FEELING AFRAID AS IF SOMETHING AWFUL MIGHT HAPPEN: NOT AT ALL
GAD7 TOTAL SCORE: 12
3. WORRYING TOO MUCH ABOUT DIFFERENT THINGS: MORE THAN HALF THE DAYS
IF YOU CHECKED OFF ANY PROBLEMS ON THIS QUESTIONNAIRE, HOW DIFFICULT HAVE THESE PROBLEMS MADE IT FOR YOU TO DO YOUR WORK, TAKE CARE OF THINGS AT HOME, OR GET ALONG WITH OTHER PEOPLE: SOMEWHAT DIFFICULT
2. NOT BEING ABLE TO STOP OR CONTROL WORRYING: SEVERAL DAYS

## 2023-07-06 ASSESSMENT — PATIENT HEALTH QUESTIONNAIRE - PHQ9
SUM OF ALL RESPONSES TO PHQ QUESTIONS 1-9: 17
SUM OF ALL RESPONSES TO PHQ QUESTIONS 1-9: 17
10. IF YOU CHECKED OFF ANY PROBLEMS, HOW DIFFICULT HAVE THESE PROBLEMS MADE IT FOR YOU TO DO YOUR WORK, TAKE CARE OF THINGS AT HOME, OR GET ALONG WITH OTHER PEOPLE: VERY DIFFICULT
5. POOR APPETITE OR OVEREATING: MORE THAN HALF THE DAYS

## 2023-07-06 ASSESSMENT — PAIN SCALES - GENERAL: PAINLEVEL: MILD PAIN (3)

## 2023-07-06 NOTE — LETTER
July 11, 2023      Hemanth Hurd  [address] [address]        Dear ,    We are writing to inform you of your test results.    Your STD testing is negative.     Resulted Orders   NEISSERIA GONORRHOEA PCR   Result Value Ref Range    Neisseria gonorrhoeae Negative Negative      Comment:      Negative for N. gonorrhoeae rRNA by transcription mediated amplification. A negative result by transcription mediated amplification does not preclude the presence of C. trachomatis infection because results are dependent on proper and adequate collection, absence of inhibitors and sufficient rRNA to be detected.   CHLAMYDIA TRACHOMATIS PCR   Result Value Ref Range    Chlamydia trachomatis Negative Negative      Comment:      A negative result by transcription mediated amplification does not preclude the presence of C. trachomatis infection because results are dependent on proper and adequate collection, absence of inhibitors and sufficient rRNA to be detected.       If you have any questions or concerns, please call the clinic at the number listed above.       Sincerely,      Mary Villarreal NP

## 2023-07-06 NOTE — PATIENT INSTRUCTIONS
Make appointment with general surgery for pilonidal cyst evalution.  Make appointment with ENT for salivary gland enlargement.  Tetanus and HPV vaccination given today.  Make lab appointment for STD urine screening.  Lab to recheck hemoglobin to make sure this is normalizing.  Start Zoloft 25 mg for 1 week and increase to 50 mg if no side effects and feel that you need to increase.  Follow-up in 1 month.

## 2023-07-06 NOTE — PROGRESS NOTES
Assessment & Plan     Salivary gland swelling  Patient presents today with sublingual swollen salivary gland in the lower left which currently is not painful.  Referral to ENT for further evaluation of cause.  - Adult ENT  Referral; Future    History of alcohol abuse  Patient is currently undergoing treatment and clean from drinking, opioids and benzodiazepine use.  He is following with counseling but currently still experiencing some depression and anxiety.  See below on how this was addressed today.    Hx of opioid abuse (H)  See note above.    History of benzodiazepine use  See note above.    Low hemoglobin  Patient had low hemoglobin recently without recheck.  Ordered CBC for evaluation today.  - CBC with platelets; Future    Screen for STD (sexually transmitted disease)  Patient requested chlamydia and gonorrhea testing.  However he has urinated within the last hour.  We will plan to schedule lab at a later date since this does not fall within his schedule today to stick around and wait.  I will notify patient of results when available.  - REVIEW OF HEALTH MAINTENANCE PROTOCOL ORDERS  - NEISSERIA GONORRHOEA PCR  - CHLAMYDIA TRACHOMATIS PCR    Current moderate episode of major depressive disorder, unspecified whether recurrent (H)  Patient currently is on Seroquel 50 mg at bedtime which helps him sleep at night.  I am adding Zoloft 25 mg daily for 7 days and working on increasing up to 50 mg if no side effects and does not feel that it is starting to be effective after 1 week.  He will continue treatment and counseling.  Recommend follow-up in 1 month for recheck via virtual visit for evaluation and refills.  - sertraline (ZOLOFT) 25 MG tablet; Take 1 tablet (25 mg) by mouth daily for 7 days, THEN 2 tablets (50 mg) daily for 23 days.    EMILY (generalized anxiety disorder)  See note above.  - sertraline (ZOLOFT) 25 MG tablet; Take 1 tablet (25 mg) by mouth daily for 7 days, THEN 2 tablets (50 mg) daily  for 23 days.    Pilonidal cyst  Patient has palpable pilonidal cyst that is currently not painful.  Referred to general surgery for evaluation and treatment.  - Adult General Surg Referral; Future    Encounter for vaccination  Patient would like to get his HPV and Tdap vaccinations up-to-date.  These were given today.  Patient will make follow-up for his remainder of his 2 HPV vaccinations.  - REVIEW OF HEALTH MAINTENANCE PROTOCOL ORDERS  - TDAP VACCINE (Adacel, Boostrix)  - HPV, IM (9 - 26 YRS) - Gardasil 9     Depression Screening Follow Up        7/6/2023     1:13 PM   PHQ   PHQ-9 Total Score 17   Q9: Thoughts of better off dead/self-harm past 2 weeks Several days   F/U: Thoughts of suicide or self-harm No   F/U: Safety concerns No         7/6/2023     1:33 PM   EMILY-7 SCORE   Total Score 12     Follow Up      Follow Up Actions Taken  Crisis resource information provided in the After Visit Summary  Patient to follow up with PCP.  Clinic staff to schedule appointment if able.    See Patient Instructions    Mary Villarreal NP  [facility]    Kathi Saxena is a 27 year old, presenting for the following health issues:  Depression (Had recently got out of in patient  treatment and is doing out patient therapy )    History of Present Illness       Reason for visit:  Establish care    He eats 2-3 servings of fruits and vegetables daily.He consumes 2 sweetened beverage(s) daily.He exercises with enough effort to increase his heart rate 10 to 19 minutes per day.  He exercises with enough effort to increase his heart rate 7 days per week.   He is taking medications regularly.    Today's PHQ-9         PHQ-9 Total Score: 17    PHQ-9 Q9 Thoughts of better off dead/self-harm past 2 weeks :   Several days  Thoughts of suicide or self harm: (P) No  Self-harm Plan:     Self-harm Action:       Safety concerns for self or others: (P) No    How difficult have these problems made it for you to do your  "work, take care of things at home, or get along with other people: Very difficult     Patient declines any suicidality or thoughts of hurting himself or others.    Concern - depression and anxiety   Onset: all his life   Description: self treated with alcohol,xanax,oxycodone,  Intensity: moderate  Progression of Symptoms:  worsening  Accompanying Signs & Symptoms: loss appetite,no self harm or suicidal,loss of interest  Previous history of similar problem: yes  Precipitating factors:        Worsened by: caffiene  Alleviating factors:        Improved by: medication in past has not helped or made him feel nausea  Therapies tried and outcome: is in treatment right now     Review of Systems   CONSTITUTIONAL: NEGATIVE for fever, chills, change in weight  INTEGUMENTARY/SKIN: POSITIVE for what patient feels is a pilonidal cyst on his coccyx   ENT:  Swelling in sublingual left gland  RESP: NEGATIVE for significant cough or SOB  CV: NEGATIVE for chest pain, palpitations or peripheral edema  PSYCHIATRIC: POSITIVE foranxiety, depressed mood, Hx anxiety, Hx depression and currently in treatment for polysubstance abuse  ROS otherwise negative      Objective    BP (!) 140/90   Pulse 85   Temp 97.7  F (36.5  C) (Tympanic)   Resp 16   Ht 1.825 m (5' 11.85\")   Wt 77 kg (169 lb 12.8 oz)   SpO2 98%   BMI 23.13 kg/m    Body mass index is 23.13 kg/m .  Physical Exam   GENERAL: healthy, alert and no distress  HENT: normal cephalic/atraumatic, ear canals and TM's normal, nose and mouth without ulcers or lesions, oropharynx clear, oral mucous membranes moist and oropharxnx crowded; palpable sublignual salivary gland on the left with no pain on palpation  NECK: no adenopathy and no asymmetry, masses, or scars  RECTAL (male): Patient has a palpable cyst on the coccyx at the top of the intergluteal fold that is not red or tender that likely represents pilonidal cyst.  PSYCH: mentation appears normal and affect flat            "

## 2023-07-07 LAB
C TRACH DNA SPEC QL NAA+PROBE: NEGATIVE
N GONORRHOEA DNA SPEC QL NAA+PROBE: NEGATIVE

## 2023-07-27 ENCOUNTER — VIRTUAL VISIT (OUTPATIENT)
Dept: FAMILY MEDICINE | Facility: CLINIC | Age: 28
End: 2023-07-27
Payer: COMMERCIAL

## 2023-07-27 DIAGNOSIS — F32.1 CURRENT MODERATE EPISODE OF MAJOR DEPRESSIVE DISORDER, UNSPECIFIED WHETHER RECURRENT (H): ICD-10-CM

## 2023-07-27 DIAGNOSIS — F41.1 GAD (GENERALIZED ANXIETY DISORDER): ICD-10-CM

## 2023-07-27 PROCEDURE — 99214 OFFICE O/P EST MOD 30 MIN: CPT | Mod: 95 | Performed by: NURSE PRACTITIONER

## 2023-07-27 PROCEDURE — 96127 BRIEF EMOTIONAL/BEHAV ASSMT: CPT | Mod: 59 | Performed by: NURSE PRACTITIONER

## 2023-07-27 RX ORDER — SERTRALINE HYDROCHLORIDE 25 MG/1
TABLET, FILM COATED ORAL
Qty: 53 TABLET | Refills: 0 | Status: CANCELLED | OUTPATIENT
Start: 2023-07-27 | End: 2023-08-26

## 2023-07-27 RX ORDER — QUETIAPINE FUMARATE 50 MG/1
25 TABLET, FILM COATED ORAL AT BEDTIME
Qty: 45 TABLET | Refills: 1 | Status: SHIPPED | OUTPATIENT
Start: 2023-07-27 | End: 2024-04-26

## 2023-07-27 ASSESSMENT — ANXIETY QUESTIONNAIRES
GAD7 TOTAL SCORE: 12
2. NOT BEING ABLE TO STOP OR CONTROL WORRYING: SEVERAL DAYS
1. FEELING NERVOUS, ANXIOUS, OR ON EDGE: SEVERAL DAYS
GAD7 TOTAL SCORE: 12
5. BEING SO RESTLESS THAT IT IS HARD TO SIT STILL: NEARLY EVERY DAY
7. FEELING AFRAID AS IF SOMETHING AWFUL MIGHT HAPPEN: SEVERAL DAYS
6. BECOMING EASILY ANNOYED OR IRRITABLE: NEARLY EVERY DAY
IF YOU CHECKED OFF ANY PROBLEMS ON THIS QUESTIONNAIRE, HOW DIFFICULT HAVE THESE PROBLEMS MADE IT FOR YOU TO DO YOUR WORK, TAKE CARE OF THINGS AT HOME, OR GET ALONG WITH OTHER PEOPLE: SOMEWHAT DIFFICULT
3. WORRYING TOO MUCH ABOUT DIFFERENT THINGS: NOT AT ALL

## 2023-07-27 ASSESSMENT — PATIENT HEALTH QUESTIONNAIRE - PHQ9
SUM OF ALL RESPONSES TO PHQ QUESTIONS 1-9: 12
5. POOR APPETITE OR OVEREATING: NEARLY EVERY DAY

## 2023-07-27 NOTE — PROGRESS NOTES
Hemanth is a 27 year old who is being evaluated via a telephone visit.      How would you like to obtain your AVS? Gaby  Patient can be contacted by cell phone: [phone]  Will anyone else be joining your telephone visit? No    Assessment & Plan     EMILY (generalized anxiety disorder)  Depression score has reduced a little and anxiety remains the same on GAD7.  Since he is feeling more hopeful on the medication and having no side effects, will increase Zoloft to 75 mg and allow for taper up to 100 mg after a week if no side effects and feels that this is helping at higher dose but not reducing symptoms significantly. Refilled Seroquel for 6 months.  Recommend follow-up in 1 month for recheck.  - QUEtiapine (SEROQUEL) 50 MG tablet; Take 0.5 tablets (25 mg) by mouth At Bedtime  - sertraline (ZOLOFT) 50 MG tablet; Take 1.5 tablets (75 mg) by mouth daily for 7 days, THEN 2 tablets (100 mg) daily for 23 days.    Current moderate episode of major depressive disorder, unspecified whether recurrent (H)  See note above.  - QUEtiapine (SEROQUEL) 50 MG tablet; Take 0.5 tablets (25 mg) by mouth At Bedtime  - sertraline (ZOLOFT) 50 MG tablet; Take 1.5 tablets (75 mg) by mouth daily for 7 days, THEN 2 tablets (100 mg) daily for 23 days.    Depression Screening Follow Up        7/27/2023     7:06 AM   PHQ   PHQ-9 Total Score 12   Q9: Thoughts of better off dead/self-harm past 2 weeks Not at all     Follow Up Actions Taken  Crisis resource information provided in After Visit Summary  Follow up recommended: telephone visit in 1 month  Adjusted patient's anti-depressant medication.     See Patient Instructions    Mary Villarreal NP  [facility]    Kathi Saxena is a 27 year old, presenting for the following health issues:  Depression and Anxiety        7/27/2023     7:01 AM   Additional Questions   Roomed by rmb   Accompanied by self         7/27/2023     7:01 AM   Patient Reported Additional  Medications   Patient reports taking the following new medications none     HPI     Depression and Anxiety Follow-Up  How are you doing with your depression since your last visit? No change  How are you doing with your anxiety since your last visit?  Worsened in the last 2 weeks; returning back to work.  Are you having other symptoms that might be associated with depression or anxiety? No  Have you had a significant life event? Financial Concerns and Housing Concerns   Do you have any concerns with your use of alcohol or other drugs? No  No side effects to zoloft and improved his mood to feel hopeful    Social History     Tobacco Use    Smoking status: Former     Types: Cigarettes    Smokeless tobacco: Former     Types: Chew     Quit date: 12/2021    Tobacco comments:     Only when unable to vape   Vaping Use    Vaping Use: Every day    Substances: Nicotine, Flavoring   Substance Use Topics    Alcohol use: Not Currently     Comment: hx of abuse    Drug use: Not Currently     Types: Benzodiazepines, Opiates         7/6/2023     1:13 PM   PHQ   PHQ-9 Total Score 17   Q9: Thoughts of better off dead/self-harm past 2 weeks Several days   F/U: Thoughts of suicide or self-harm No   F/U: Safety concerns No         7/6/2023     1:33 PM   EMILY-7 SCORE   Total Score 12         7/27/2023     7:06 AM   Last PHQ-9   1.  Little interest or pleasure in doing things 3   2.  Feeling down, depressed, or hopeless 3   3.  Trouble falling or staying asleep, or sleeping too much 0   4.  Feeling tired or having little energy 3   5.  Poor appetite or overeating 0   6.  Feeling bad about yourself 1   7.  Trouble concentrating 1   8.  Moving slowly or restless 1   Q9: Thoughts of better off dead/self-harm past 2 weeks 0   PHQ-9 Total Score 12   Difficulty at work, home, or with people Somewhat difficult         7/27/2023     7:06 AM   EMILY-7    1. Feeling nervous, anxious, or on edge 1   2. Not being able to stop or control worrying 1   3.  Worrying too much about different things 0   4. Trouble relaxing 3   5. Being so restless that it is hard to sit still 3   6. Becoming easily annoyed or irritable 3   7. Feeling afraid, as if something awful might happen 1   EMILY-7 Total Score 12   If you checked any problems, how difficult have they made it for you to do your work, take care of things at home, or get along with other people? Somewhat difficult       How many servings of fruits and vegetables do you eat daily?  2-3  On average, how many sweetened beverages do you drink each day (Examples: soda, juice, sweet tea, etc.  Do NOT count diet or artificially sweetened beverages)?   2  How many days per week do you exercise enough to make your heart beat faster? 7  How many minutes a day do you exercise enough to make your heart beat faster? 10 - 19  How many days per week do you miss taking your medication? 0    Review of Systems   CONSTITUTIONAL: NEGATIVE for fever, chills, change in weight  RESP: NEGATIVE for significant cough or SOB  CV: NEGATIVE for chest pain, palpitations or peripheral edema  PSYCHIATRIC: POSITIVE forHx anxiety, Hx depression, and feeling that there is some life changes that are increasing anxiety with new job etc.  He admits to feeling more hopeful on the Zoloft but it has not had a lot of effect, no side effects to the medication that he has noticed.  ROS otherwise negative      Objective    Vitals - Patient Reported  Weight (Patient Reported): 74.8 kg (165 lb)  Height (Patient Reported): 182.9 cm (6')  BMI (Based on Pt Reported Ht/Wt): 22.38  Pain Score: No Pain (0)    Physical Exam   Patient sounds healthy and no cough or shortness of breath, able to speak in sentences. He is able to answer questions and     Telephone-Visit Details    Type of service: Telephone visit  Visit was 6 minutes in length.

## 2023-08-23 ENCOUNTER — VIRTUAL VISIT (OUTPATIENT)
Dept: FAMILY MEDICINE | Facility: CLINIC | Age: 28
End: 2023-08-23
Payer: COMMERCIAL

## 2023-08-23 DIAGNOSIS — F41.1 GAD (GENERALIZED ANXIETY DISORDER): ICD-10-CM

## 2023-08-23 DIAGNOSIS — F32.1 CURRENT MODERATE EPISODE OF MAJOR DEPRESSIVE DISORDER, UNSPECIFIED WHETHER RECURRENT (H): ICD-10-CM

## 2023-08-23 PROCEDURE — 96127 BRIEF EMOTIONAL/BEHAV ASSMT: CPT | Mod: 59 | Performed by: NURSE PRACTITIONER

## 2023-08-23 PROCEDURE — 99213 OFFICE O/P EST LOW 20 MIN: CPT | Mod: 95 | Performed by: NURSE PRACTITIONER

## 2023-08-23 RX ORDER — QUETIAPINE FUMARATE 50 MG/1
25 TABLET, FILM COATED ORAL AT BEDTIME
Qty: 45 TABLET | Refills: 1 | Status: CANCELLED | OUTPATIENT
Start: 2023-08-23

## 2023-08-23 RX ORDER — QUETIAPINE FUMARATE 25 MG/1
25 TABLET, FILM COATED ORAL 2 TIMES DAILY
Qty: 90 TABLET | Refills: 0 | Status: SHIPPED | OUTPATIENT
Start: 2023-08-23 | End: 2023-12-04

## 2023-08-23 RX ORDER — SERTRALINE HYDROCHLORIDE 100 MG/1
100 TABLET, FILM COATED ORAL DAILY
Qty: 90 TABLET | Refills: 0 | Status: SHIPPED | OUTPATIENT
Start: 2023-08-23 | End: 2023-12-04

## 2023-08-23 ASSESSMENT — ANXIETY QUESTIONNAIRES
3. WORRYING TOO MUCH ABOUT DIFFERENT THINGS: NOT AT ALL
7. FEELING AFRAID AS IF SOMETHING AWFUL MIGHT HAPPEN: NOT AT ALL
IF YOU CHECKED OFF ANY PROBLEMS ON THIS QUESTIONNAIRE, HOW DIFFICULT HAVE THESE PROBLEMS MADE IT FOR YOU TO DO YOUR WORK, TAKE CARE OF THINGS AT HOME, OR GET ALONG WITH OTHER PEOPLE: NOT DIFFICULT AT ALL
GAD7 TOTAL SCORE: 1
2. NOT BEING ABLE TO STOP OR CONTROL WORRYING: NOT AT ALL
5. BEING SO RESTLESS THAT IT IS HARD TO SIT STILL: NOT AT ALL
6. BECOMING EASILY ANNOYED OR IRRITABLE: SEVERAL DAYS
1. FEELING NERVOUS, ANXIOUS, OR ON EDGE: NOT AT ALL
GAD7 TOTAL SCORE: 1

## 2023-08-23 ASSESSMENT — PATIENT HEALTH QUESTIONNAIRE - PHQ9
SUM OF ALL RESPONSES TO PHQ QUESTIONS 1-9: 2
5. POOR APPETITE OR OVEREATING: NOT AT ALL

## 2023-08-23 NOTE — PATIENT INSTRUCTIONS
Refills sent, call to schedule yearly exam with your PCP for further refills.      Our Clinic hours are:  Mondays    7:20 am - 7 pm  Tues -  Fri  7:20 am - 5 pm    Clinic Phone: [phone]    The clinic lab opens at 7:30 am Mon - Fri and appointments are required.    [facility]  Ph. [phone]  Monday  8 am - 7pm  Tues - Fri 8 am - 5:30 pm

## 2023-08-23 NOTE — PROGRESS NOTES
Hemanth is a 28 year old who is being evaluated via a billable telephone visit.      What phone number would you like to be contacted at? [phone]  How would you like to obtain your AVS? Gaby    [place] Location (provider location):  On-site    Assessment & Plan     EMILY (generalized anxiety disorder)    - QUEtiapine (SEROQUEL) 25 MG tablet; Take 1 tablet (25 mg) by mouth 2 times daily  - sertraline (ZOLOFT) 100 MG tablet; Take 1 tablet (100 mg) by mouth daily    Current moderate episode of major depressive disorder, unspecified whether recurrent (H)    - QUEtiapine (SEROQUEL) 25 MG tablet; Take 1 tablet (25 mg) by mouth 2 times daily  - sertraline (ZOLOFT) 100 MG tablet; Take 1 tablet (100 mg) by mouth daily             See Patient Instructions  Patient Instructions   Refills sent, call to schedule yearly exam with your PCP for further refills.      Our Clinic hours are:  Mondays    7:20 am - 7 pm  Tues -  Fri  7:20 am - 5 pm    Clinic Phone: [phone]    The clinic lab opens at 7:30 am Mon - Fri and appointments are required.    [facility]  Ph. [phone]  Monday  8 am - 7pm  Tues - Fri 8 am - 5:30 pm         YANICK Darby CNP  [employer]    Kathi Saxena is a 28 year old, presenting for the following health issues:  Depression (And anxiety )      8/23/2023     3:19 PM   Additional Questions   Roomed by [facility]     Depression and Anxiety Follow-Up  How are you doing with your depression since your last visit? Improved   How are you doing with your anxiety since your last visit?  Improved   Are you having other symptoms that might be associated with depression or anxiety? No  Have you had a significant life event? No   Do you have any concerns with your use of alcohol or other drugs? No    Social History     Tobacco Use    Smoking status: Former     Types: Cigarettes    Smokeless tobacco: Former     Types: Chew     Quit date: 12/2021     Tobacco comments:     Only when unable to vape   Vaping Use    Vaping Use: Every day    Substances: Nicotine, Flavoring   Substance Use Topics    Alcohol use: Not Currently     Comment: hx of abuse    Drug use: Not Currently     Types: Benzodiazepines, Opiates         7/6/2023     1:13 PM 7/27/2023     7:06 AM   PHQ   PHQ-9 Total Score 17 12   Q9: Thoughts of better off dead/self-harm past 2 weeks Several days Not at all   F/U: Thoughts of suicide or self-harm No    F/U: Safety concerns No          7/6/2023     1:33 PM 7/27/2023     7:06 AM   EMILY-7 SCORE   Total Score 12 12         8/23/2023     4:34 PM   Last PHQ-9   1.  Little interest or pleasure in doing things 1   2.  Feeling down, depressed, or hopeless 0   3.  Trouble falling or staying asleep, or sleeping too much 0   4.  Feeling tired or having little energy 1   5.  Poor appetite or overeating 0   6.  Feeling bad about yourself 0   7.  Trouble concentrating 0   8.  Moving slowly or restless 0   Q9: Thoughts of better off dead/self-harm past 2 weeks 0   PHQ-9 Total Score 2   Difficulty at work, home, or with people Not difficult at all         8/23/2023     4:34 PM   EMILY-7    1. Feeling nervous, anxious, or on edge 0   2. Not being able to stop or control worrying 0   3. Worrying too much about different things 0   4. Trouble relaxing 0   5. Being so restless that it is hard to sit still 0   6. Becoming easily annoyed or irritable 1   7. Feeling afraid, as if something awful might happen 0   EMILY-7 Total Score 1   If you checked any problems, how difficult have they made it for you to do your work, take care of things at home, or get along with other people? Not difficult at all       Suicide Assessment Five-step Evaluation and Treatment (SAFE-T)    How many servings of fruits and vegetables do you eat daily?  2-3  On average, how many sweetened beverages do you drink each day (Examples: soda, juice, sweet tea, etc.  Do NOT count diet or artificially  sweetened beverages)?   2  How many days per week do you exercise enough to make your heart beat faster? 4  How many minutes a day do you exercise enough to make your heart beat faster? 10 - 19  How many days per week do you miss taking your medication? 1  What makes it hard for you to take your medications?  remembering to take        Review of Systems   Constitutional, HEENT, cardiovascular, pulmonary, gi and gu systems are negative, except as otherwise noted.      Objective           Vitals:  No vitals were obtained today due to virtual visit.    Physical Exam   healthy, alert, and no distress  PSYCH: Alert and oriented times 3; coherent speech, normal   rate and volume, able to articulate logical thoughts, able   to abstract reason, no tangential thoughts, no hallucinations   or delusions  His affect is normal  RESP: No cough, no audible wheezing, able to talk in full sentences  Remainder of exam unable to be completed due to telephone visits                Phone call duration: 11 minutes

## 2023-09-21 ENCOUNTER — HOSPITAL ENCOUNTER (EMERGENCY)
Facility: CLINIC | Age: 28
Discharge: HOME | End: 2023-09-21
Attending: NURSE PRACTITIONER | Admitting: NURSE PRACTITIONER
Payer: COMMERCIAL

## 2023-09-21 VITALS
SYSTOLIC BLOOD PRESSURE: 137 MMHG | HEART RATE: 69 BPM | DIASTOLIC BLOOD PRESSURE: 93 MMHG | RESPIRATION RATE: 16 BRPM | OXYGEN SATURATION: 98 % | TEMPERATURE: 97.7 F

## 2023-09-21 DIAGNOSIS — J06.9 VIRAL URI WITH COUGH: ICD-10-CM

## 2023-09-21 LAB
FLUAV RNA SPEC QL NAA+PROBE: NEGATIVE
FLUBV RNA RESP QL NAA+PROBE: NEGATIVE
RSV RNA SPEC NAA+PROBE: NEGATIVE
SARS-COV-2 RNA RESP QL NAA+PROBE: NEGATIVE

## 2023-09-21 PROCEDURE — G0463 HOSPITAL OUTPT CLINIC VISIT: HCPCS | Performed by: NURSE PRACTITIONER

## 2023-09-21 PROCEDURE — 87637 SARSCOV2&INF A&B&RSV AMP PRB: CPT | Performed by: NURSE PRACTITIONER

## 2023-09-21 PROCEDURE — 99213 OFFICE O/P EST LOW 20 MIN: CPT | Performed by: NURSE PRACTITIONER

## 2023-09-21 ASSESSMENT — ACTIVITIES OF DAILY LIVING (ADL): ADLS_ACUITY_SCORE: 35

## 2023-09-21 NOTE — ED PROVIDER NOTES
ID:   Hemanth Hurd      CC:   Nasal Congestion    Diarrhea       HPI:  Hemanth Hurd is a 28 year old male with complaints of: cough. Symptoms began about 5 days ago and course has been evolving. Cough is productive with yellow sputum, and no hemoptysis, some tightness and discomfort across chest with coughing. Worsened with nothing in particular. Associated symptoms include: nasal drainage that is yellow, sore throat, fatigue and about 3 days of having some minor diarrhea. Had a minor fever 3 days ago. Denies any swelling in the arm or legs and hemoptysis. Self care to this point includes: ibuprofen.     Patient does not have diabetes and is not immune compromised. Patient does not have any underlying chronic respiratory illness or CHF. Patient does not have any history of PE or DVT and has no complaints of swelling or pain in the lower extremities. He does no smoke tobacco. Denies any recent foreign travel. Denies any known Covid or influenza exposure.      Triage Note:   Pt reports congestion with yellow mucous x5 days   Pt also reports headaches and diarrhea x3 days   4 episodes of diarrhea a day      I have reviewed the PMH, Meds, Allergies, SH, and FH, including:    PAST MEDICAL HISTORY:   History of alcohol abuse     History of benzodiazepine use     History of opiate therapy        PROBLEM LIST:   Abdominal pain    Anxiety and depression    History of alcohol abuse    Hx of opioid abuse (H)    History of benzodiazepine use       FAMILY MEDICAL HISTORY:   Rheumatoid Arthritis Mother     Mental Illness Father        MEDICATIONS:   QUEtiapine (SEROQUEL) 25 MG tablet    QUEtiapine (SEROQUEL) 50 MG tablet    sertraline (ZOLOFT) 100 MG tablet    sertraline (ZOLOFT) 50 MG tablet       ALLERGIES:  No Known Allergies      ROS:  As per HPI. All other systems reviewed and appear to be negative.      PHYSICAL EXAM:  Blood pressure (!) 137/93, pulse 69, temperature 97.7  F (36.5  C), temperature source  Tympanic, resp. rate 16, SpO2 98 %.  GENERAL APPEARANCE: Healthy; alert and oriented X3; no acute distress  SKIN: Normal without rashes, petechiae or purpura  HEAD: Atraumatic; normocephalic; without lesion  NOSE: Nares normal; septum midline; mucosa normal  MOUTH/OROPHARYNX: Normal lips, tongue, buccal mucosa and pharynx minorly erythematous but without lesions or exudate  NECK: Supple with no nodes, stiffness, jugular venous distention, stridor or bruits  LUNGS: Normal respirations; good expansion with good diaphragmatic excursion; clear to auscultation with no extra sounds  HEART: Regular rhythm and rate; S1 and S2 normal; no murmurs, heaves, thrills, clicks or rubs  NEUROLOGIC: Alert and oriented times 3; mental status normal      LABS:  Labs Ordered and Resulted from Time of ED Arrival to Time of ED Departure - No data to display      EMERGENCY DEPARTMENT COURSE/ MDM:  Hemanth Hurd is a 28 year old male whom presented to ED with cough. Vitals upon arrival Blood pressure (!) 137/93, pulse 69, temperature 97.7  F (36.5  C), temperature source Tympanic, resp. rate 16, SpO2 98 %. History obtained and exam completed. Throat mildly erythematous. Lungs are clear. Normal neurological exam. Patient appears healthy, well-hydrated and in no acute distress. Labs obtained include COVID swab. Patient has access to [facility] and he is happy to get these results at home. At this point the results of this test will not  of patient's condition. His symptoms have been ongoing for 5 days to be technically considered a lower contagious if his COVID  test did result positive he would just need to be wearing an N95 for the next 5 days if he was going to be around other individuals. We discussed that. Patient is fine with discharging home awaiting these results.     Impression and plan discussed with patient. Questions answered, concerns addressed, indications for urgent re-evaluation reviewed, and   given. Patient and parent/caregiver agree with treatment plan and have no further questions at this time. Patient discharged in good condition. Was given [facility] discharge instructions and follow-up recommendations. Patient will return to the ED if their symptoms worsen or they develop any of the concerning signs/symptoms as outlined in the EPIC d/c instructions. Otherwise, patient will follow up with primary care provider as directed in discharge summary.      ASSESSMENT:  Viral UTI with Cough      PLAN (as discussed with patient):  Your Covid test is pending. Results from screening should be available in the next 24-72 hours, depending on the volumes the lab is having to process at this time. Your results will come to your through [facility]. If you have never setup a My Health account with the hospital, then your results will be called to you at the number we have on file. Stay in isolation until you receive those results back and they are negative. According to recommendations you should be in isolation for the first 5 days of your symptoms, which you are already at the threshold off. Beyond that if your test is positive you should continue to wear an N95 for the next 5 days as long as you have symptoms that are manageable and you are not having any excessive coughing sneezing or vomiting.    As for the diarrhea, at this time it is okay if you go ahead and start some loperamide to help decrease your loose stools               Summer Martinez NP  09/21/23 [id number]

## 2023-09-21 NOTE — DISCHARGE INSTRUCTIONS
Your Covid test is pending. Results from screening should be available in the next 24-72 hours, depending on the volumes the lab is having to process at this time. Your results will come to your through My Health. If you have never setup a My Health account with the hospital, then your results will be called to you at the number we have on file. Stay in isolation until you receive those results back and they are negative. According to recommendations you should be in isolation for the first 5 days of your symptoms, which you are already at the threshold off. Beyond that if your test is positive you should continue to wear an N95 for the next 5 days as long as you have symptoms that are manageable and you are not having any excessive coughing sneezing or vomiting.    As for the diarrhea, at this time it is okay if you go ahead and start some loperamide to help decrease your loose stools

## 2023-09-21 NOTE — RESULT ENCOUNTER NOTE
Negative for Influenza A, Influenza B, RSV and Covid19.  Patient will receive the Covid19 result via [facility] and a letter will be sent via [facility] (if active) or via the mail

## 2023-09-21 NOTE — ED TRIAGE NOTES
Pt reports congestion with yellow mucous x5 days   Pt also reports headaches and diarrhea x3 days   4 episodes of diarrhea a day

## 2023-11-30 DIAGNOSIS — F32.1 CURRENT MODERATE EPISODE OF MAJOR DEPRESSIVE DISORDER, UNSPECIFIED WHETHER RECURRENT (H): ICD-10-CM

## 2023-11-30 DIAGNOSIS — F41.1 GAD (GENERALIZED ANXIETY DISORDER): ICD-10-CM

## 2023-11-30 RX ORDER — QUETIAPINE FUMARATE 25 MG/1
25 TABLET, FILM COATED ORAL 2 TIMES DAILY
Qty: 90 TABLET | Refills: 0 | OUTPATIENT
Start: 2023-11-30

## 2023-11-30 RX ORDER — SERTRALINE HYDROCHLORIDE 100 MG/1
100 TABLET, FILM COATED ORAL DAILY
Qty: 90 TABLET | Refills: 0 | OUTPATIENT
Start: 2023-11-30

## 2023-11-30 NOTE — TELEPHONE ENCOUNTER
Overdue for needed care. Please call to schedule medication follow up. Once appt is scheduled, route back to the pool.    Julie Behrendt RN

## 2023-12-04 DIAGNOSIS — F41.1 GAD (GENERALIZED ANXIETY DISORDER): ICD-10-CM

## 2023-12-04 DIAGNOSIS — F32.1 CURRENT MODERATE EPISODE OF MAJOR DEPRESSIVE DISORDER, UNSPECIFIED WHETHER RECURRENT (H): ICD-10-CM

## 2023-12-04 RX ORDER — QUETIAPINE FUMARATE 25 MG/1
25 TABLET, FILM COATED ORAL 2 TIMES DAILY
Qty: 60 TABLET | Refills: 0 | Status: SHIPPED | OUTPATIENT
Start: 2023-12-04 | End: 2024-04-26

## 2023-12-04 RX ORDER — SERTRALINE HYDROCHLORIDE 100 MG/1
100 TABLET, FILM COATED ORAL DAILY
Qty: 30 TABLET | Refills: 0 | Status: SHIPPED | OUTPATIENT
Start: 2023-12-04 | End: 2024-04-26 | Stop reason: DRUGHIGH

## 2023-12-04 NOTE — PROGRESS NOTES
Pt called requesting refills on sertraline, and Zoloft. Pt last seen 7/2023. Medications pended, will route to PCP.    ELIZABETH Maurer.

## 2023-12-04 NOTE — PROGRESS NOTES
Covering for primary/ordering provider:  Per note dated 8/23, patient was to schedule PCP visit for annual exam prior to further refills. Please schedule him an establish care visit. 30 day refills of medication sent to last until visit.  Tanya Bonner, CNP

## 2023-12-16 ENCOUNTER — HEALTH MAINTENANCE LETTER (OUTPATIENT)
Age: 28
End: 2023-12-16

## 2024-04-16 ENCOUNTER — TELEPHONE (OUTPATIENT)
Dept: SCHEDULING | Facility: CLINIC | Age: 29
End: 2024-04-16
Payer: COMMERCIAL

## 2024-04-16 NOTE — TELEPHONE ENCOUNTER
Reason for Call:  Appointment Request    Patient requesting this type of appt:  patient wants to make sure a video visit is appropriate for his FMLA concerns that is scheduled on April 26th-    Requested provider:  pt states that Mary Villarreal should be his PCP    Reason patient unable to be scheduled: Not within requested timeframe    When does patient want to be seen/preferred time:  patient would also like to discuss being seen sooner than the scheduled appointment either in person or virtually    Comments:     [facility] or would you prefer to receive a phone call?:   Patient would prefer a phone call   Okay to leave a detailed message?: Yes at Home number on file [phone] (home)    Call taken on 4/16/2024 at 10:48 AM by Makayla DIAZ

## 2024-04-16 NOTE — TELEPHONE ENCOUNTER
I called pt to clarify this request.  Pt was last seen in person last summer and is current with the practice.    Pt went to treatment and has missed 3 1/2 weeks of work.  Pt needs note from care provider to return to work.      Pt asks if this can be addressed at upcoming virtual visit on 4/26/24?    Routed to provider for confirmation.    Miriam Wing RN

## 2024-04-26 ENCOUNTER — VIRTUAL VISIT (OUTPATIENT)
Dept: FAMILY MEDICINE | Facility: CLINIC | Age: 29
End: 2024-04-26
Payer: COMMERCIAL

## 2024-04-26 DIAGNOSIS — F32.1 CURRENT MODERATE EPISODE OF MAJOR DEPRESSIVE DISORDER, UNSPECIFIED WHETHER RECURRENT (H): ICD-10-CM

## 2024-04-26 DIAGNOSIS — F41.1 GAD (GENERALIZED ANXIETY DISORDER): ICD-10-CM

## 2024-04-26 PROCEDURE — 99213 OFFICE O/P EST LOW 20 MIN: CPT | Mod: 95 | Performed by: NURSE PRACTITIONER

## 2024-04-26 RX ORDER — QUETIAPINE FUMARATE 25 MG/1
25 TABLET, FILM COATED ORAL AT BEDTIME
Qty: 90 TABLET | Refills: 1 | Status: SHIPPED | OUTPATIENT
Start: 2024-04-26 | End: 2024-07-10 | Stop reason: SINTOL

## 2024-04-26 RX ORDER — GABAPENTIN 300 MG/1
300 CAPSULE ORAL 3 TIMES DAILY
COMMUNITY
Start: 2024-04-09 | End: 2024-04-26

## 2024-04-26 RX ORDER — GABAPENTIN 300 MG/1
300 CAPSULE ORAL 3 TIMES DAILY
Qty: 270 CAPSULE | Refills: 1 | Status: SHIPPED | OUTPATIENT
Start: 2024-04-26 | End: 2024-07-10

## 2024-04-26 RX ORDER — CLONIDINE HYDROCHLORIDE 0.1 MG/1
0.1 TABLET ORAL EVERY MORNING
Qty: 90 TABLET | Refills: 1 | Status: SHIPPED | OUTPATIENT
Start: 2024-04-26 | End: 2024-09-10

## 2024-04-26 RX ORDER — CLONIDINE HYDROCHLORIDE 0.1 MG/1
1 TABLET ORAL EVERY MORNING
COMMUNITY
Start: 2024-04-09 | End: 2024-04-26

## 2024-04-26 ASSESSMENT — ANXIETY QUESTIONNAIRES
6. BECOMING EASILY ANNOYED OR IRRITABLE: MORE THAN HALF THE DAYS
5. BEING SO RESTLESS THAT IT IS HARD TO SIT STILL: NEARLY EVERY DAY
IF YOU CHECKED OFF ANY PROBLEMS ON THIS QUESTIONNAIRE, HOW DIFFICULT HAVE THESE PROBLEMS MADE IT FOR YOU TO DO YOUR WORK, TAKE CARE OF THINGS AT HOME, OR GET ALONG WITH OTHER PEOPLE: SOMEWHAT DIFFICULT
3. WORRYING TOO MUCH ABOUT DIFFERENT THINGS: NOT AT ALL
2. NOT BEING ABLE TO STOP OR CONTROL WORRYING: NOT AT ALL
7. FEELING AFRAID AS IF SOMETHING AWFUL MIGHT HAPPEN: NOT AT ALL
GAD7 TOTAL SCORE: 8
GAD7 TOTAL SCORE: 8
1. FEELING NERVOUS, ANXIOUS, OR ON EDGE: SEVERAL DAYS

## 2024-04-26 ASSESSMENT — PATIENT HEALTH QUESTIONNAIRE - PHQ9
5. POOR APPETITE OR OVEREATING: MORE THAN HALF THE DAYS
SUM OF ALL RESPONSES TO PHQ QUESTIONS 1-9: 15

## 2024-04-26 NOTE — LETTER
April 26, 2024      Hemanth Hurd  [address] [address]        To Whom It May Concern:    Hemanth Hurd was seen in our clinic. He may return to work without restrictions.      Sincerely,        Mary Villarreal NP

## 2024-04-26 NOTE — PROGRESS NOTES
Hemanth is a 28 year old who is being evaluated via a billable video visit.    How would you like to obtain your AVS? Arvindharcitlaly  If the video visit is dropped, the invitation should be resent by: Text to cell phone: [phone]  Will anyone else be joining your video visit? No    Assessment & Plan     EMILY (generalized anxiety disorder)  Symptoms do seem to be possible ADHD.  He has [facility] appointment in the next week or two.  Advised to keep that appointment.  If needed, I have referred for ADHD evaluation through our psychiatry offices as well if able to get in sooner.  I refilled the Seroquel, zoloft at 150 mg and advised that if this is not working or having side effects he can reduce by 50 mg every week until tolerated or wean off.  I also refilled his clonidine and gabapentin at this time as well.  Advised follow-up as needed pending if he will be short term or long term patient of psychiatry.  Note given today to return to work without restrictions.  - Adult Mental Health  Referral; Future  - QUEtiapine (SEROQUEL) 25 MG tablet; Take 1 tablet (25 mg) by mouth at bedtime  - sertraline (ZOLOFT) 50 MG tablet; Take 3 tablets (150 mg) by mouth daily  - cloNIDine (CATAPRES) 0.1 MG tablet; Take 1 tablet (0.1 mg) by mouth every morning  - gabapentin (NEURONTIN) 300 MG capsule; Take 1 capsule (300 mg) by mouth 3 times daily    Current moderate episode of major depressive disorder, unspecified whether recurrent (H)  See note above.  - QUEtiapine (SEROQUEL) 25 MG tablet; Take 1 tablet (25 mg) by mouth at bedtime  - sertraline (ZOLOFT) 50 MG tablet; Take 3 tablets (150 mg) by mouth daily  - cloNIDine (CATAPRES) 0.1 MG tablet; Take 1 tablet (0.1 mg) by mouth every morning  - gabapentin (NEURONTIN) 300 MG capsule; Take 1 capsule (300 mg) by mouth 3 times daily    Depression Screening Follow Up    Follow Up Actions Taken  Crisis resource information provided in After Visit Summary  Mental Health Referral placed        See Patient Instructions    Kathi Saxena is a 28 year old, presenting for the following health issues:  Letter for School/Work        4/26/2024     4:03 PM   Additional Questions   Roomed by rmb   Accompanied by self         4/26/2024   Forms   Any forms needing to be completed Yes         4/26/2024     4:03 PM   Patient Reported Additional Medications   Patient reports taking the following new medications none     HPI     Depression and Anxiety   How are you doing with your depression since your last visit? Worsened   How are you doing with your anxiety since your last visit?  Improved   Are you having other symptoms that might be associated with depression or anxiety? No  Have you had a significant life event? No   Do you have any concerns with your use of alcohol or other drugs? No    Patient continues to have depression and anxiety but feels that this all stems from possible ADHD.  He has tried to make appointment for evaluation and diagnosis in the past but was unable to get in for awhile so attempted to treat as anxiety/depression.  Currently he is on many medications without control.  He has appointment with [facility] coming up in the next 1-2 weeks.  He would like [facility] evaluation.    Social History     Tobacco Use    Smoking status: Former     Types: Cigarettes    Smokeless tobacco: Former     Types: Chew     Quit date: 12/2021    Tobacco comments:     Only when unable to vape   Vaping Use    Vaping status: Every Day    Substances: Nicotine, Flavoring    Devices: Refillable tank   Substance Use Topics    Alcohol use: Not Currently     Comment: hx of abuse    Drug use: Not Currently     Types: Benzodiazepines, Opiates         7/27/2023     7:06 AM 8/23/2023     4:34 PM 4/26/2024     4:07 PM   PHQ   PHQ-9 Total Score 12 2 15   Q9: Thoughts of better off dead/self-harm past 2 weeks Not at all Not at all Not at all         7/27/2023     7:06 AM 8/23/2023     4:34 PM 4/26/2024     4:07 PM    EMILY-7 SCORE   Total Score 12 1 8         4/26/2024     4:07 PM   Last PHQ-9   1.  Little interest or pleasure in doing things 3   2.  Feeling down, depressed, or hopeless 3   3.  Trouble falling or staying asleep, or sleeping too much 0   4.  Feeling tired or having little energy 3   5.  Poor appetite or overeating 0   6.  Feeling bad about yourself 2   7.  Trouble concentrating 3   8.  Moving slowly or restless 1   Q9: Thoughts of better off dead/self-harm past 2 weeks 0   PHQ-9 Total Score 15   Difficulty at work, home, or with people Very difficult         4/26/2024     4:07 PM   EMILY-7    1. Feeling nervous, anxious, or on edge 1   2. Not being able to stop or control worrying 0   3. Worrying too much about different things 0   4. Trouble relaxing 2   5. Being so restless that it is hard to sit still 3   6. Becoming easily annoyed or irritable 2   7. Feeling afraid, as if something awful might happen 0   EMILY-7 Total Score 8   If you checked any problems, how difficult have they made it for you to do your work, take care of things at home, or get along with other people? Somewhat difficult       Suicide Assessment Five-step Evaluation and Treatment (SAFE-T)    How many servings of fruits and vegetables do you eat daily?  2-3  On average, how many sweetened beverages do you drink each day (Examples: soda, juice, sweet tea, etc.  Do NOT count diet or artificially sweetened beverages)?   2  How many days per week do you exercise enough to make your heart beat faster? 5  How many minutes a day do you exercise enough to make your heart beat faster? 30 - 60  How many days per week do you miss taking your medication? 0      Review of Systems  CONSTITUTIONAL: NEGATIVE for fever, chills, change in weight  RESP: NEGATIVE for significant cough or SOB  CV: NEGATIVE for chest pain, palpitations or peripheral edema  PSYCHIATRIC: POSITIVE foranxiety and depressed mood  ROS otherwise negative      Objective    Vitals - Patient  Reported  Weight (Patient Reported): 97.5 kg (215 lb)  Height (Patient Reported): 182.9 cm (6')  BMI (Based on Pt Reported Ht/Wt): 29.16  Pain Score: No Pain (0)      Vitals:  No vitals were obtained today due to virtual visit.    Physical Exam   GENERAL: alert and no distress  EYES: Eyes grossly normal to inspection.  No discharge or erythema, or obvious scleral/conjunctival abnormalities.  RESP: No audible wheeze, cough, or visible cyanosis.    SKIN: Visible skin clear. No significant rash, abnormal pigmentation or lesions.  NEURO: Cranial nerves grossly intact.  Mentation and speech appropriate for age.  PSYCH: mentation appears normal, affect normal/bright, and very fidgety during the visit      Video-Visit Details    Type of service:  Video Visit   Originating Location (pt. Location): Home  Distant Location (provider location):  On-site  Platform used for Video Visit: Regina  Signed Electronically by: Mary Villarreal NP

## 2024-04-29 ENCOUNTER — MYC MEDICAL ADVICE (OUTPATIENT)
Dept: FAMILY MEDICINE | Facility: CLINIC | Age: 29
End: 2024-04-29
Payer: COMMERCIAL

## 2024-04-29 NOTE — PATIENT INSTRUCTIONS
Okay to reduce zoloft down 50 mg every week until on lower tolerated dose or off if not seeing any change in mood.  Referral for ADHD testing since this may be the correct diagnosis since medication does not seem to be helping symptoms.  Continue with [facility] appointment and if they address the ADHD testing then don't worry about my referral.   Follow-up with me as needed if psychiatry refers you back to a provider or make an appointment with a provider to establish care to get treatment transferred when stable on medication.  Letter given to return to work with no restrictions at this ti

## 2024-05-30 ENCOUNTER — TRANSFERRED RECORDS (OUTPATIENT)
Dept: HEALTH INFORMATION MANAGEMENT | Facility: CLINIC | Age: 29
End: 2024-05-30
Payer: COMMERCIAL

## 2024-06-25 ENCOUNTER — TELEPHONE (OUTPATIENT)
Dept: FAMILY MEDICINE | Facility: CLINIC | Age: 29
End: 2024-06-25
Payer: COMMERCIAL

## 2024-06-25 NOTE — TELEPHONE ENCOUNTER
New Medication Request    Contacts       Contact Date/Time Type Contact Phone/Fax    06/25/2024 01:58 PM CDT Phone (Incoming) Hemanth Hurd (Self) [phone] (H)            What medication are you requesting?: Suboxone    Reason for medication request: Opioid withdrawl      Controlled Substance Agreement on file:   CSA -- Patient Level:    CSA: None found at the patient level.           Preferred Pharmacy:   [facility] [address] [place] [address] [address] [address] [address] [address]  Phone: [phone] Fax: [phone]      Could we send this information to you in [facility] or would you prefer to receive a phone call?:   Patient would prefer a phone call   Okay to leave a detailed message?: Yes at Home number on file [phone] (home)

## 2024-06-26 NOTE — TELEPHONE ENCOUNTER
Left message for patient to return call to clinic RN.  Tana Denny RN       Include Z78.9 (Other Specified Conditions Influencing Health Status) As An Associated Diagnosis?: No

## 2024-07-10 ENCOUNTER — OFFICE VISIT (OUTPATIENT)
Dept: FAMILY MEDICINE | Facility: CLINIC | Age: 29
End: 2024-07-10
Payer: COMMERCIAL

## 2024-07-10 VITALS
TEMPERATURE: 98.1 F | OXYGEN SATURATION: 98 % | SYSTOLIC BLOOD PRESSURE: 122 MMHG | HEART RATE: 70 BPM | RESPIRATION RATE: 16 BRPM | HEIGHT: 72 IN | DIASTOLIC BLOOD PRESSURE: 82 MMHG | BODY MASS INDEX: 29.09 KG/M2 | WEIGHT: 214.8 LBS

## 2024-07-10 DIAGNOSIS — F32.1 CURRENT MODERATE EPISODE OF MAJOR DEPRESSIVE DISORDER, UNSPECIFIED WHETHER RECURRENT (H): ICD-10-CM

## 2024-07-10 DIAGNOSIS — F11.20 OPIOID USE DISORDER, SEVERE, DEPENDENCE (H): Primary | ICD-10-CM

## 2024-07-10 DIAGNOSIS — F19.982 DRUG-INDUCED INSOMNIA (H): ICD-10-CM

## 2024-07-10 DIAGNOSIS — F41.1 GAD (GENERALIZED ANXIETY DISORDER): ICD-10-CM

## 2024-07-10 PROBLEM — Z87.898 HISTORY OF BENZODIAZEPINE USE: Status: RESOLVED | Noted: 2023-07-06 | Resolved: 2024-07-10

## 2024-07-10 PROBLEM — F11.11 HX OF OPIOID ABUSE (H): Status: RESOLVED | Noted: 2023-07-06 | Resolved: 2024-07-10

## 2024-07-10 PROBLEM — R10.9 ABDOMINAL PAIN: Status: RESOLVED | Noted: 2017-01-31 | Resolved: 2024-07-10

## 2024-07-10 PROCEDURE — G2211 COMPLEX E/M VISIT ADD ON: HCPCS | Performed by: PHYSICIAN ASSISTANT

## 2024-07-10 PROCEDURE — 96127 BRIEF EMOTIONAL/BEHAV ASSMT: CPT | Performed by: PHYSICIAN ASSISTANT

## 2024-07-10 PROCEDURE — 99214 OFFICE O/P EST MOD 30 MIN: CPT | Performed by: PHYSICIAN ASSISTANT

## 2024-07-10 RX ORDER — MODAFINIL 100 MG/1
100 TABLET ORAL DAILY
COMMUNITY

## 2024-07-10 RX ORDER — GABAPENTIN 300 MG/1
900-1200 CAPSULE ORAL AT BEDTIME
Qty: 270 CAPSULE | Refills: 0 | Status: SHIPPED | OUTPATIENT
Start: 2024-07-10 | End: 2024-09-24

## 2024-07-10 RX ORDER — BUPRENORPHINE AND NALOXONE 8; 2 MG/1; MG/1
1 FILM, SOLUBLE BUCCAL; SUBLINGUAL 2 TIMES DAILY
COMMUNITY
Start: 2024-07-02 | End: 2024-07-10 | Stop reason: DRUGHIGH

## 2024-07-10 RX ORDER — BUPRENORPHINE AND NALOXONE 12; 3 MG/1; MG/1
0.5 FILM, SOLUBLE BUCCAL; SUBLINGUAL DAILY
Qty: 10 FILM | Refills: 0 | Status: SHIPPED | OUTPATIENT
Start: 2024-07-10 | End: 2024-07-22

## 2024-07-10 ASSESSMENT — PATIENT HEALTH QUESTIONNAIRE - PHQ9
SUM OF ALL RESPONSES TO PHQ QUESTIONS 1-9: 11
SUM OF ALL RESPONSES TO PHQ QUESTIONS 1-9: 11
10. IF YOU CHECKED OFF ANY PROBLEMS, HOW DIFFICULT HAVE THESE PROBLEMS MADE IT FOR YOU TO DO YOUR WORK, TAKE CARE OF THINGS AT HOME, OR GET ALONG WITH OTHER PEOPLE: SOMEWHAT DIFFICULT

## 2024-07-10 ASSESSMENT — PAIN SCALES - GENERAL: PAINLEVEL: MILD PAIN (3)

## 2024-07-10 NOTE — PROGRESS NOTES
Assessment & Plan     Opioid use disorder, severe, dependence (H)  Lonstandhing hx of OUD. Just completed a treatment program at [place] and is currently in a sober house. Started on Suboxone in [place] and is currently taking about 6 mg bid. No cravings or withdrawal symptoms. Sleep is his big issue still. See below. Counseled on harm reduction. We will continue Suboxone at 6 mg daily an follow-up in 2 weeks for recheck.  reassuring. Pt unable to leave urine sample today, but will complete at all in person office visits in the future.   - Urine Drug Screen Ethanol Urine Qualitative OQB819, Methadone Urine Qualitative [id number], Creatinine Urine Random PPD260, Oxycodone Urine Qualitative UMX9363, Buprenorphine Urine Qualitative [id number]  - Buprenorphine HCl-Naloxone HCl (SUBOXONE) 12-3 MG FILM per film; Place 0.5 Film under the tongue daily    EMILY (generalized anxiety disorder)  Current moderate episode of major depressive disorder, unspecified whether recurrent (H)  Drug-induced insomnia (H)  Seroquel works but causes significant hunger and he has had a 50 lbs weight gain over the last year. We will stop this and try higher dose Gabapentin at bedtime since he was already taking 300 mg for some anxiety. Follow-up in 2 weeks for recheck. If not helpful, would recommend Doxepin or low dose Mirtazapine.   - gabapentin (NEURONTIN) 300 MG capsule; Take 3-4 capsules (900-1,200 mg) by mouth at bedtime    The longitudinal plan of care for the diagnosis(es)/condition(s) as documented were addressed during this visit. Due to the added complexity in care, I will continue to support Hemanth in the subsequent management and with ongoing continuity of care.     BMI  Estimated body mass index is 29.13 kg/m  as calculated from the following:    Height as of this encounter: 1.829 m (6').    Weight as of this encounter: 97.4 kg (214 lb 12.8 oz).       Depression Screening Follow Up  Follow Up Actions Taken  Crisis resource  information provided in After Visit Summary  Referred patient back to PCP  Adjusted patient's anti-depressant medication.     Kathi Saxena is a 28 year old, presenting for the following health issues:  Suboxone  and Establish Care        7/10/2024    10:12 AM   Additional Questions   Roomed by Keli CATALAN CMA   Accompanied by Self     HPI     Current Narcotic Use/History:  Which opioid(s) are you currently using, that are not already on your med list?: Oxycodone, Fentanyl   How do you use your drug of choice? Smoking, snorting   What is your estimated total dose (mg if pills, grams of heroin) per day? Unsure-500mg of oxycodone   When did you last use? 06/22/2024  Have you ever tried to quit on your own? YES  What have you done to try quiting in the past? Treatment   What was the longest period of time you have been sober from opioids?: close to 2 years [id number]  When and how did you start using opioids? At 13 he was given morphine when he had mono and that is when he realized he liked it. Then he would convince friends to take it from his parents     Other Substance/Psychiatric History:  Have you been struggling with any other mental health symptoms?: Yes-anxiety depression and adhd symptoms   Any other drug use other than opioids?: (!) ALCOHOL, (!) CANNABIS PRODUCTS, (!) COCAINE, (!) HEROIN, (!) PRESCRIPTION DRUGS, (!) AMEPHETAMINES, (!) KRATOM, and (!) XANAX OR OTHER BENZOS-with in the last year   Do you struggle with any other addictive behaviors (sex, gambling, internet, shopping, TV etc): Yes-sex   Are you sexually active?: no       Family History:  Does anyone in your family have a history of a use disorder? YES- parents     Opioid Use Disorder Criteria:        No data to display              PHQ Score:      8/23/2023     4:34 PM 4/26/2024     4:07 PM 7/10/2024     9:50 AM   PHQ   PHQ-9 Total Score 2 15 11   Q9: Thoughts of better off dead/self-harm past 2 weeks Not at all Not at all Not at all     GAD7  Score:       7/27/2023     7:06 AM 8/23/2023     4:34 PM 4/26/2024     4:07 PM   EMILY-7 SCORE   Total Score 12 1 8     PDMP Review         Value Time User    State PDMP site checked  Yes 7/10/2024 10:42 AM Hiram Horner PA-C            Review of Systems  See HPI       Objective    /82 (BP Location: Right arm, Patient Position: Sitting, Cuff Size: Adult Large)   Pulse 70   Temp 98.1  F (36.7  C) (Tympanic)   Resp 16   Ht 1.829 m (6')   Wt 97.4 kg (214 lb 12.8 oz)   SpO2 98%   BMI 29.13 kg/m    Body mass index is 29.13 kg/m .  Physical Exam   Constitutional: healthy, alert, and no distress  Head: Normocephalic. Atraumatic  Eyes: No conjunctival injection, sclera anicteric  Respiratory: No resp distress.  Musculoskeletal: extremities normal- no gross deformities noted, and normal muscle tone  Neurologic: Gait normal. CN 2-12 grossly intact  Psychiatric: mentation appears normal and affect normal/bright         Signed Electronically by: Hiram Horner PA-C

## 2024-07-22 ENCOUNTER — OFFICE VISIT (OUTPATIENT)
Dept: FAMILY MEDICINE | Facility: CLINIC | Age: 29
End: 2024-07-22
Payer: COMMERCIAL

## 2024-07-22 ENCOUNTER — DOCUMENTATION ONLY (OUTPATIENT)
Dept: BEHAVIORAL HEALTH | Facility: CLINIC | Age: 29
End: 2024-07-22

## 2024-07-22 VITALS
BODY MASS INDEX: 29.02 KG/M2 | WEIGHT: 214 LBS | TEMPERATURE: 97 F | RESPIRATION RATE: 18 BRPM | HEART RATE: 54 BPM | OXYGEN SATURATION: 99 % | DIASTOLIC BLOOD PRESSURE: 82 MMHG | SYSTOLIC BLOOD PRESSURE: 112 MMHG

## 2024-07-22 DIAGNOSIS — F41.1 GAD (GENERALIZED ANXIETY DISORDER): ICD-10-CM

## 2024-07-22 DIAGNOSIS — F19.982 DRUG-INDUCED INSOMNIA (H): ICD-10-CM

## 2024-07-22 DIAGNOSIS — F11.20 OPIOID USE DISORDER, SEVERE, DEPENDENCE (H): Primary | ICD-10-CM

## 2024-07-22 DIAGNOSIS — F32.1 CURRENT MODERATE EPISODE OF MAJOR DEPRESSIVE DISORDER, UNSPECIFIED WHETHER RECURRENT (H): ICD-10-CM

## 2024-07-22 LAB — CREAT UR-MCNC: 49 MG/DL

## 2024-07-22 PROCEDURE — 99214 OFFICE O/P EST MOD 30 MIN: CPT | Performed by: PHYSICIAN ASSISTANT

## 2024-07-22 PROCEDURE — G2211 COMPLEX E/M VISIT ADD ON: HCPCS | Performed by: PHYSICIAN ASSISTANT

## 2024-07-22 PROCEDURE — G0481 DRUG TEST DEF 8-14 CLASSES: HCPCS | Performed by: PHYSICIAN ASSISTANT

## 2024-07-22 RX ORDER — DOXEPIN HYDROCHLORIDE 25 MG/1
25 CAPSULE ORAL AT BEDTIME
Qty: 30 CAPSULE | Refills: 0 | Status: SHIPPED | OUTPATIENT
Start: 2024-07-22 | End: 2024-08-30

## 2024-07-22 RX ORDER — SERTRALINE HYDROCHLORIDE 100 MG/1
100 TABLET, FILM COATED ORAL DAILY
Qty: 90 TABLET | Refills: 3 | Status: SHIPPED | OUTPATIENT
Start: 2024-07-22

## 2024-07-22 RX ORDER — BUPRENORPHINE AND NALOXONE 12; 3 MG/1; MG/1
0.5 FILM, SOLUBLE BUCCAL; SUBLINGUAL 2 TIMES DAILY
Qty: 30 FILM | Refills: 0 | Status: SHIPPED | OUTPATIENT
Start: 2024-07-22 | End: 2024-08-26

## 2024-07-22 ASSESSMENT — PAIN SCALES - GENERAL: PAINLEVEL: NO PAIN (0)

## 2024-07-22 NOTE — PROGRESS NOTES
Conducted chart review.  Coordinated with PCP regarding patient's PHQ-9 scores.  Offered [facility] involvement; PCP declined [facility] involvement due to not responding to message

## 2024-07-22 NOTE — PROGRESS NOTES
Assessment & Plan   Opioid use disorder, severe, dependence (H)  Stable on current dose of Suboxone. No cravings. No withdrawal symptoms. Strong family support at home. Attending group x 2 per week and therapy once per week. Encouraged ongoing lifestyle changes.  reassuring. UDS ordered. Suboxone refilled for 1 month.   - Drug Confirmation Panel Urine with Creatinine  - Buprenorphine HCl-Naloxone HCl (SUBOXONE) 12-3 MG FILM per film; Place 0.5 Film under the tongue 2 times daily    Current moderate episode of major depressive disorder, unspecified whether recurrent (H)  EMILY (generalized anxiety disorder)  Drug-induced insomnia (H)  High dose gabapentin was not helpful for sleep. Returned to using Seroquel. Will trial Doxepin. Will use gabapentin for anxiety as needed. Follow-up in 1 month for recheck.   - sertraline (ZOLOFT) 100 MG tablet; Take 1 tablet (100 mg) by mouth daily  - doxepin (SINEQUAN) 25 MG capsule; Take 1 capsule (25 mg) by mouth at bedtime    The longitudinal plan of care for the diagnosis(es)/condition(s) as documented were addressed during this visit. Due to the added complexity in care, I will continue to support Hemanth in the subsequent management and with ongoing continuity of care.       BMI  Estimated body mass index is 29.02 kg/m  as calculated from the following:    Height as of 7/10/24: 1.829 m (6').    Weight as of this encounter: 97.1 kg (214 lb).     Subjective   Hemanth is a 28 year old, presenting for the following health issues:  suboxone         7/22/2024    11:03 AM   Additional Questions   Roomed by Keli CATALAN CMA   Accompanied by Self     HPI     He is currently taking 12-3 mg of buprenorphine daily. This is taken in bid    Status Since Last Visit:  Have you used any opioids since your last visit?: no use since last visit  Do you feel that your dose of suboxone is too high or too low? Adequate  Have there been cravings for opioids? No   Any withdrawal symptoms?  none     Any side  effects from the medication? constipation  Any alcohol use? no  Any other recreational drug use? no    Precipitating Factors:  Triggers have been: non-existent   Other Supports:  Do you attend counseling or meet with a therapist? Yes  Do you attend NA or AA meetings? Yes  Do you have/meet with a sponsor? Yes  Family and support systems have been: Help   What other goals have you been working on (job, family, relationships, etc)? none    PDMP Review         Value Time User    State PDMP site checked  Yes 7/22/2024 11:17 AM Hiram Horner PA-C          Review of Systems  See HPI       Objective    /82 (BP Location: Right arm, Patient Position: Sitting, Cuff Size: Adult Large)   Pulse 54   Temp 97  F (36.1  C) (Tympanic)   Resp 18   Wt 97.1 kg (214 lb)   SpO2 99%   BMI 29.02 kg/m    Body mass index is 29.02 kg/m .  Physical Exam   Constitutional: healthy, alert, and no distress  Head: Normocephalic. Atraumatic  Eyes: No conjunctival injection, sclera anicteric  Respiratory: No resp distress.  Musculoskeletal: extremities normal- no gross deformities noted, and normal muscle tone  Neurologic: Gait normal. CN 2-12 grossly intact  Psychiatric: mentation appears normal and affect normal/bright       Signed Electronically by: Hiram Horner PA-C

## 2024-07-25 LAB
BUPRENORPHINE UR CFM-MCNC: 41 NG/ML
BUPRENORPHINE/CREAT UR: 84 NG/MG {CREAT}
GABAPENTIN UR QL CFM: PRESENT
NALOXONE UR CFM-MCNC: 103 NG/ML
NALOXONE: 210 NG/MG {CREAT}
NORBUPRENORPHINE UR CFM-MCNC: 262 NG/ML
NORBUPRENORPHINE/CREAT UR: 535 NG/MG {CREAT}

## 2024-08-12 ENCOUNTER — DOCUMENTATION ONLY (OUTPATIENT)
Dept: BEHAVIORAL HEALTH | Facility: CLINIC | Age: 29
End: 2024-08-12
Payer: COMMERCIAL

## 2024-08-23 DIAGNOSIS — F11.20 OPIOID USE DISORDER, SEVERE, DEPENDENCE (H): ICD-10-CM

## 2024-08-23 NOTE — TELEPHONE ENCOUNTER
Pt calling - is currently out of medication. Hoping to toby refilled today.    Yen Juarez Patient

## 2024-08-26 RX ORDER — BUPRENORPHINE AND NALOXONE 12; 3 MG/1; MG/1
0.5 FILM, SOLUBLE BUCCAL; SUBLINGUAL 2 TIMES DAILY
Qty: 30 FILM | Refills: 0 | Status: SHIPPED | OUTPATIENT
Start: 2024-08-26 | End: 2024-09-10

## 2024-08-26 NOTE — TELEPHONE ENCOUNTER
Please call this patient and let him know there is a refill at his pharmacy. If he runs into issues filling it or finding it, please find me ASAP to update this, so that he does not withdraw/worsen his withdrawal.     Hiram Horner PA-C

## 2024-08-29 DIAGNOSIS — F41.1 GAD (GENERALIZED ANXIETY DISORDER): ICD-10-CM

## 2024-08-29 DIAGNOSIS — F32.1 CURRENT MODERATE EPISODE OF MAJOR DEPRESSIVE DISORDER, UNSPECIFIED WHETHER RECURRENT (H): ICD-10-CM

## 2024-08-29 DIAGNOSIS — F19.982 DRUG-INDUCED INSOMNIA (H): ICD-10-CM

## 2024-08-30 RX ORDER — DOXEPIN HYDROCHLORIDE 25 MG/1
25 CAPSULE ORAL AT BEDTIME
Qty: 30 CAPSULE | Refills: 0 | Status: SHIPPED | OUTPATIENT
Start: 2024-08-30 | End: 2024-09-10

## 2024-08-30 NOTE — TELEPHONE ENCOUNTER
Routing refill request to provider for review/approval because: PHQ-9 >5      8/23/2023     4:34 PM 4/26/2024     4:07 PM 7/10/2024     9:50 AM   PHQ   PHQ-9 Total Score 2 15 11   Q9: Thoughts of better off dead/self-harm past 2 weeks Not at all Not at all Not at all     Last office visit: 7/22/2024 ; last virtual visit: Visit date not found with prescribing provider:    Future Office Visit: 09/10/24  Corinna ELLIOTT RN

## 2024-09-10 ENCOUNTER — OFFICE VISIT (OUTPATIENT)
Dept: FAMILY MEDICINE | Facility: CLINIC | Age: 29
End: 2024-09-10
Payer: COMMERCIAL

## 2024-09-10 VITALS
RESPIRATION RATE: 22 BRPM | BODY MASS INDEX: 28.71 KG/M2 | TEMPERATURE: 98 F | DIASTOLIC BLOOD PRESSURE: 88 MMHG | SYSTOLIC BLOOD PRESSURE: 134 MMHG | WEIGHT: 212 LBS | HEART RATE: 72 BPM | OXYGEN SATURATION: 98 % | HEIGHT: 72 IN

## 2024-09-10 DIAGNOSIS — F10.11 HISTORY OF ALCOHOL ABUSE: ICD-10-CM

## 2024-09-10 DIAGNOSIS — F11.20 OPIOID USE DISORDER, SEVERE, DEPENDENCE (H): Primary | ICD-10-CM

## 2024-09-10 DIAGNOSIS — F41.1 GAD (GENERALIZED ANXIETY DISORDER): ICD-10-CM

## 2024-09-10 DIAGNOSIS — F32.1 CURRENT MODERATE EPISODE OF MAJOR DEPRESSIVE DISORDER, UNSPECIFIED WHETHER RECURRENT (H): ICD-10-CM

## 2024-09-10 LAB — CREAT UR-MCNC: 120 MG/DL

## 2024-09-10 PROCEDURE — G2211 COMPLEX E/M VISIT ADD ON: HCPCS | Performed by: PHYSICIAN ASSISTANT

## 2024-09-10 PROCEDURE — 99214 OFFICE O/P EST MOD 30 MIN: CPT | Performed by: PHYSICIAN ASSISTANT

## 2024-09-10 PROCEDURE — 96127 BRIEF EMOTIONAL/BEHAV ASSMT: CPT | Performed by: PHYSICIAN ASSISTANT

## 2024-09-10 PROCEDURE — G0481 DRUG TEST DEF 8-14 CLASSES: HCPCS | Performed by: PHYSICIAN ASSISTANT

## 2024-09-10 RX ORDER — QUETIAPINE FUMARATE 50 MG/1
50 TABLET, FILM COATED ORAL 2 TIMES DAILY
Qty: 90 TABLET | Refills: 1 | Status: SHIPPED | OUTPATIENT
Start: 2024-09-10

## 2024-09-10 RX ORDER — BUPRENORPHINE AND NALOXONE 12; 3 MG/1; MG/1
1 FILM, SOLUBLE BUCCAL; SUBLINGUAL 2 TIMES DAILY
Qty: 60 FILM | Refills: 0 | Status: SHIPPED | OUTPATIENT
Start: 2024-09-10 | End: 2024-09-24

## 2024-09-10 RX ORDER — ACAMPROSATE CALCIUM 333 MG/1
666 TABLET, DELAYED RELEASE ORAL 3 TIMES DAILY
Qty: 180 TABLET | Refills: 0 | Status: SHIPPED | OUTPATIENT
Start: 2024-09-10

## 2024-09-10 RX ORDER — CLONIDINE HYDROCHLORIDE 0.1 MG/1
.1-.2 TABLET ORAL 2 TIMES DAILY PRN
Qty: 120 TABLET | Refills: 3 | Status: SHIPPED | OUTPATIENT
Start: 2024-09-10

## 2024-09-10 ASSESSMENT — ANXIETY QUESTIONNAIRES
2. NOT BEING ABLE TO STOP OR CONTROL WORRYING: NEARLY EVERY DAY
GAD7 TOTAL SCORE: 17
3. WORRYING TOO MUCH ABOUT DIFFERENT THINGS: SEVERAL DAYS
5. BEING SO RESTLESS THAT IT IS HARD TO SIT STILL: NEARLY EVERY DAY
IF YOU CHECKED OFF ANY PROBLEMS ON THIS QUESTIONNAIRE, HOW DIFFICULT HAVE THESE PROBLEMS MADE IT FOR YOU TO DO YOUR WORK, TAKE CARE OF THINGS AT HOME, OR GET ALONG WITH OTHER PEOPLE: VERY DIFFICULT
6. BECOMING EASILY ANNOYED OR IRRITABLE: SEVERAL DAYS
GAD7 TOTAL SCORE: 17
1. FEELING NERVOUS, ANXIOUS, OR ON EDGE: NEARLY EVERY DAY
7. FEELING AFRAID AS IF SOMETHING AWFUL MIGHT HAPPEN: NEARLY EVERY DAY

## 2024-09-10 ASSESSMENT — PATIENT HEALTH QUESTIONNAIRE - PHQ9
10. IF YOU CHECKED OFF ANY PROBLEMS, HOW DIFFICULT HAVE THESE PROBLEMS MADE IT FOR YOU TO DO YOUR WORK, TAKE CARE OF THINGS AT HOME, OR GET ALONG WITH OTHER PEOPLE: EXTREMELY DIFFICULT
SUM OF ALL RESPONSES TO PHQ QUESTIONS 1-9: 20
SUM OF ALL RESPONSES TO PHQ QUESTIONS 1-9: 20
5. POOR APPETITE OR OVEREATING: NEARLY EVERY DAY

## 2024-09-10 ASSESSMENT — PAIN SCALES - GENERAL: PAINLEVEL: NO PAIN (0)

## 2024-09-10 ASSESSMENT — COLUMBIA-SUICIDE SEVERITY RATING SCALE - C-SSRS
2. IN THE PAST MONTH, HAVE YOU ACTUALLY HAD ANY THOUGHTS OF KILLING YOURSELF?: NO
6. HAVE YOU EVER DONE ANYTHING, STARTED TO DO ANYTHING, OR PREPARED TO DO ANYTHING TO END YOUR LIFE?: NO
1. WITHIN THE PAST MONTH, HAVE YOU WISHED YOU WERE DEAD OR WISHED YOU COULD GO TO SLEEP AND NOT WAKE UP?: YES

## 2024-09-10 NOTE — PROGRESS NOTES
Assessment & Plan   Opioid use disorder, severe, dependence (H)  Unfortunately Hema is going through a significant life stressor with the break-up of his significant other.  He has been binge drinking alcohol for the last 2 weeks and has been waking up with a withdrawal type syndrome including anxiety, restlessness, pain, nausea and vomiting, sweatiness.  This goes away with continued drinking.  He did not use alcohol prior to this and has been on a stable dose of Suboxone.  I am not quite sure exactly how he could develop an alcohol withdrawal syndrome within 2 weeks time but what may be happening is he is drinking so much alcohol that is exerting an effect on his opioid receptors/buprenorphine and causing some degree of opioid withdrawal.  I have never really seen this but theoretically this could be possible based on alcohol's effect on opioid receptors in the brain.  I think during this time it is reasonable for us to increase his Suboxone to try to protect him against this withdrawal as well as treat his alcohol binge drinking as below.  He has also had a difficult time sleeping so we will switch him from about doxepin back to Seroquel which he knows works well for him.  We will follow-up in 2 weeks for recheck.  Opioid warning reviewed.  Risk of overdose following a period of abstinence due to decrease tolerance was discussed, including risk of death. Risk of overdose if using Suboxone with other substances particuarly benzodiazepines/alcohol was reviewed. Strongly recommended abstain from alcohol, benzodiazepines, THC, opioids and other drugs of abuse. Increased risk of relapse for opioids with use of these substances discussed.  Increased risk of overdose/death with use of other substances particularly benzodiazepines/alcohol reviewed.    - Drug Confirmation Panel Urine with Creatinine  - Buprenorphine HCl-Naloxone HCl (SUBOXONE) 12-3 MG FILM per film; Place 1 Film under the tongue 2 times daily.  -  QUEtiapine (SEROQUEL) 50 MG tablet; Take 1 tablet (50 mg) by mouth 2 times daily.    EMILY (generalized anxiety disorder)  Current moderate episode of major depressive disorder, unspecified whether recurrent (H)  Obviously he is going through a lot as above with the break-up of his significant other.  He does not have a plan or intent for suicide but does have passive thoughts.  These withdrawal phenomenon's are making this worse which we are treating as above.  He will also start to use extra clonidine and gabapentin for anxiety during the day and we will see him back in 2 weeks for recheck.  - cloNIDine (CATAPRES) 0.1 MG tablet; Take 1-2 tablets (0.1-0.2 mg) by mouth 2 times daily as needed (withdrawal symptoms, anxiety).    History of alcohol abuse  As noted above.  Binging practically for the last 2 weeks with what sounds like some sort of withdrawal syndrome.  We cannot use naltrexone since he takes opioids regularly but instead we talked about acamprosate as a possible option.  This works less well in people who have not been abstinent from alcohol for a while but his last drink was greater than 24 hours ago and I am hoping that the addition of Campral will also help with his sleep issue as has been shown to improve that as well.  We will adjust his dose of Suboxone as above and see him back in 2 weeks to see how things are going.  - acamprosate (CAMPRAL) 333 MG EC tablet; Take 2 tablets (666 mg) by mouth 3 times daily.    The longitudinal plan of care for the diagnosis(es)/condition(s) as documented were addressed during this visit. Due to the added complexity in care, I will continue to support Hemanth in the subsequent management and with ongoing continuity of care.     BMI  Estimated body mass index is 28.75 kg/m  as calculated from the following:    Height as of this encounter: 1.829 m (6').    Weight as of this encounter: 96.2 kg (212 lb).     Depression Screening Follow Up        9/10/2024     8:20 AM  "  PHQ   PHQ-9 Total Score 20   Q9: Thoughts of better off dead/self-harm past 2 weeks More than half the days   F/U: Thoughts of suicide or self-harm No   F/U: Safety concerns No           9/10/2024    11:05 AM   C-SSRS (Brief [place])   Within the last month, have you wished you were dead or wished you could go to sleep and not wake up? Yes   Within the last month, have you had any actual thoughts of killing yourself? No   Within the last month, have you ever done anything, started to do anything, or prepared to do anything to end your life? No     Follow Up Actions Taken  Crisis resource information provided in the After Visit Summary  Patient to follow up with PCP.  Clinic staff to schedule appointment if able.    Discussed the following ways the patient can remain in a safe environment:  remove alcohol and be around others      Kathi Saxena is a 29 year old, presenting for the following health issues:  Follow Up (Suboxone follow up)        9/10/2024     8:35 AM   Additional Questions   Roomed by Marilyn DWYER CMA     HPI     He is currently taking 6 mg of buprenorphine daily. This is taken in two dose(s) daily.     Status Since Last Visit:  Have you used any opioids since your last visit?: no use since last visit  Do you feel that your dose of suboxone is too high or too low? Adequate  Have there been cravings for opioids? No   Any withdrawal symptoms?  none     Any side effects from the medication?  Headaches more frequently-feels like it started around the time he started taking Doxepin  Any alcohol use? Drinking heavily the last 2 weeks-about a liter a day or so.  Has not had anything to drink today, but feels \"absolutely terrible\".  States he has a lot of stress in his life as of late.   Any other recreational drug use? Took 3 sleeping pills that are not prescribed to him, doesn't know what it was called, smoked marijuana a few times in the last two weeks.       Precipitating Factors:  Triggers have " been: non-existent   Other Supports:  Do you attend counseling or meet with a therapist? Yes  Do you attend NA or AA meetings? Yes  Do you have/meet with a sponsor? Yes  Family and support systems have been: Great  What other goals have you been working on (job, family, relationships, etc)? Not really      4/26/2024     4:07 PM 7/10/2024     9:50 AM 9/10/2024     8:20 AM   PHQ   PHQ-9 Total Score 15 11 20   Q9: Thoughts of better off dead/self-harm past 2 weeks Not at all Not at all More than half the days   F/U: Thoughts of suicide or self-harm   No   F/U: Safety concerns   No         8/23/2023     4:34 PM 4/26/2024     4:07 PM 9/10/2024     8:38 AM   EMILY-7 SCORE   Total Score 1 8 17         PDMP Review         Value Time User    State PDMP site checked  Yes 8/26/2024  7:19 AM Hiram Horner PA-C          Review of Systems  See HPI       Objective    /88   Pulse 72   Temp 98  F (36.7  C) (Tympanic)   Resp 22   Ht 1.829 m (6')   Wt 96.2 kg (212 lb)   SpO2 98%   BMI 28.75 kg/m    Body mass index is 28.75 kg/m .  Physical Exam   Constitutional: healthy, alert, and no distress  Head: Normocephalic. Atraumatic  Eyes: No conjunctival injection, sclera anicteric  Neck: supple, no thyromegaly, nodules or asymmetry of the thyroid. No cervical LAD.  Cardiovascular: RRR. No murmurs, clicks, gallops, or rubs. No peripheral edema.   Respiratory: No resp distress. Lungs CTAB bilaterally.   Musculoskeletal: extremities normal- no gross deformities noted, and normal muscle tone  Skin: no suspicious lesions or rashes  Neurologic: Gait normal. CN 2-12 grossly intact  Psychiatric: mentation appears normal and affect normal/bright           Signed Electronically by: Hiram Horner PA-C

## 2024-09-10 NOTE — PATIENT INSTRUCTIONS
Lets increase Suboxone to 12 mg twice per day.     Start Campral for alcohol cravings/ongoing cessation, and this could possibly help with sleep.     Switch Doxepin to Seroquel.     Use clonidine and gabapentin during the day if needed, based on anxiety, depression, and any withdrawal symptoms.     Increase the number of meetings/sponsor contacts you do at this time.

## 2024-09-12 LAB
BUPRENORPHINE UR CFM-MCNC: 134 NG/ML
BUPRENORPHINE/CREAT UR: 112 NG/MG {CREAT}
GABAPENTIN UR QL CFM: PRESENT
NALOXONE UR CFM-MCNC: 305 NG/ML
NALOXONE: 254 NG/MG {CREAT}
NORBUPRENORPHINE UR CFM-MCNC: 195 NG/ML
NORBUPRENORPHINE/CREAT UR: 163 NG/MG {CREAT}
TEMAZEPAM UR QL CFM: PRESENT

## 2024-09-24 ENCOUNTER — OFFICE VISIT (OUTPATIENT)
Dept: FAMILY MEDICINE | Facility: CLINIC | Age: 29
End: 2024-09-24
Payer: COMMERCIAL

## 2024-09-24 VITALS
HEIGHT: 72 IN | OXYGEN SATURATION: 98 % | RESPIRATION RATE: 22 BRPM | BODY MASS INDEX: 29.88 KG/M2 | TEMPERATURE: 96.5 F | SYSTOLIC BLOOD PRESSURE: 134 MMHG | DIASTOLIC BLOOD PRESSURE: 88 MMHG | WEIGHT: 220.6 LBS | HEART RATE: 69 BPM

## 2024-09-24 DIAGNOSIS — F11.20 OPIOID USE DISORDER, SEVERE, DEPENDENCE (H): Primary | ICD-10-CM

## 2024-09-24 DIAGNOSIS — F41.1 GAD (GENERALIZED ANXIETY DISORDER): ICD-10-CM

## 2024-09-24 DIAGNOSIS — F32.1 CURRENT MODERATE EPISODE OF MAJOR DEPRESSIVE DISORDER, UNSPECIFIED WHETHER RECURRENT (H): ICD-10-CM

## 2024-09-24 LAB — CREAT UR-MCNC: 101 MG/DL

## 2024-09-24 PROCEDURE — G0481 DRUG TEST DEF 8-14 CLASSES: HCPCS | Performed by: PHYSICIAN ASSISTANT

## 2024-09-24 PROCEDURE — 99214 OFFICE O/P EST MOD 30 MIN: CPT | Performed by: PHYSICIAN ASSISTANT

## 2024-09-24 PROCEDURE — G2211 COMPLEX E/M VISIT ADD ON: HCPCS | Performed by: PHYSICIAN ASSISTANT

## 2024-09-24 RX ORDER — BUPRENORPHINE AND NALOXONE 12; 3 MG/1; MG/1
1 FILM, SOLUBLE BUCCAL; SUBLINGUAL 2 TIMES DAILY
Qty: 60 FILM | Refills: 1 | Status: SHIPPED | OUTPATIENT
Start: 2024-09-24

## 2024-09-24 RX ORDER — GABAPENTIN 300 MG/1
900-1200 CAPSULE ORAL AT BEDTIME
Qty: 270 CAPSULE | Refills: 3 | Status: SHIPPED | OUTPATIENT
Start: 2024-09-24

## 2024-09-24 ASSESSMENT — PAIN SCALES - GENERAL: PAINLEVEL: NO PAIN (0)

## 2024-09-24 NOTE — PROGRESS NOTES
Assessment & Plan   Opioid use disorder, severe, dependence (H)  Doing much better on 24 mg daily.  Again he was not drinking long enough to actually withdrawal from alcohol so potentially he was just drinking so heavily that he is affecting his opioid receptors in a way that was causing maybe more opioid withdrawal.  Regardless he is doing much better and he is no longer drinking so we will continue him on 24 mg daily.   reassuring.  UDS ordered.  Follow-up in 2 months for recheck.   - Drug Confirmation Panel Urine with Creatinine  - Buprenorphine HCl-Naloxone HCl (SUBOXONE) 12-3 MG FILM per film; Place 1 Film under the tongue 2 times daily.    EMILY (generalized anxiety disorder)  Current moderate episode of major depressive disorder, unspecified whether recurrent (H)  Much improved especially since he stopped drinking alcohol.  He will continue Zoloft, Seroquel clonidine gabapentin.  We will monitor symptoms closely and adjust therapy as needed.  - gabapentin (NEURONTIN) 300 MG capsule; Take 3-4 capsules (900-1,200 mg) by mouth at bedtime.    The longitudinal plan of care for the diagnosis(es)/condition(s) as documented were addressed during this visit. Due to the added complexity in care, I will continue to support Hemanth in the subsequent management and with ongoing continuity of care.     BMI  Estimated body mass index is 29.92 kg/m  as calculated from the following:    Height as of this encounter: 1.829 m (6').    Weight as of this encounter: 100.1 kg (220 lb 9.6 oz).     Subjective   Hemanth is a 29 year old, presenting for the following health issues:  Recheck Medication (Suboxone follow up)        9/24/2024     8:40 AM   Additional Questions   Roomed by ORA Pacheco   Drinking much less. Had 1 drink after detox. He does still have some urge to drink. Has not started Campral yet. Interested in it though.     He is currently taking 12 mg of buprenorphine daily. This is taken in two dose(s) daily.      Status Since Last Visit:  Have you used any opioids since your last visit?: no use since last visit  Do you feel that your dose of suboxone is too high or too low? Adequate  Have there been cravings for opioids? No   Any withdrawal symptoms?  none     Any side effects from the medication?  none  Any alcohol use? Still drinking alcohol, but not as much.  Any other recreational drug use? no    Precipitating Factors:  Triggers have been: non-existent   Other Supports:  Do you attend counseling or meet with a therapist? Yes  Do you attend NA or AA meetings? Yes  Do you have/meet with a sponsor? Yes  Family and support systems have been: Great  What other goals have you been working on (job, family, relationships, etc)? Not really    PDMP Review         Value Time User    State PDMP site checked  Yes 8/26/2024  7:19 AM Hiram Horner PA-C          Review of Systems  See HPI       Objective    /88 (BP Location: Right arm, Patient Position: Sitting, Cuff Size: Adult Regular)   Pulse 69   Temp (!) 96.5  F (35.8  C) (Tympanic)   Resp 22   Ht 1.829 m (6')   Wt 100.1 kg (220 lb 9.6 oz)   SpO2 98%   BMI 29.92 kg/m    Body mass index is 29.92 kg/m .  Physical Exam   Constitutional: healthy, alert, and no distress  Head: Normocephalic. Atraumatic  Eyes: No conjunctival injection, sclera anicteric  Neck: supple, no thyromegaly, nodules or asymmetry of the thyroid. No cervical LAD.  Cardiovascular: RRR. No murmurs, clicks, gallops, or rubs. No peripheral edema.   Respiratory: No resp distress. Lungs CTAB bilaterally.   Musculoskeletal: extremities normal- no gross deformities noted, and normal muscle tone  Skin: no suspicious lesions or rashes  Neurologic: Gait normal. CN 2-12 grossly intact  Psychiatric: mentation appears normal and affect normal/bright         Signed Electronically by: Hiram Horner PA-C

## 2024-09-24 NOTE — PATIENT INSTRUCTIONS
Continue current medicines.     I recommend starting Campral to help with alcohol cravings and this might even help you with sleep.     Follow-up in 2 months for recheck.

## 2024-09-26 LAB
BUPRENORPHINE UR CFM-MCNC: 175 NG/ML
BUPRENORPHINE/CREAT UR: 173 NG/MG {CREAT}
GABAPENTIN UR QL CFM: PRESENT
NALOXONE UR CFM-MCNC: 481 NG/ML
NALOXONE: 476 NG/MG {CREAT}
NORBUPRENORPHINE UR CFM-MCNC: ABNORMAL NG/ML
NORDIAZEPAM UR QL CFM: PRESENT
OXAZEPAM UR QL CFM: PRESENT
TEMAZEPAM UR QL CFM: PRESENT

## 2024-12-05 ENCOUNTER — TELEPHONE (OUTPATIENT)
Dept: BEHAVIORAL HEALTH | Facility: CLINIC | Age: 29
End: 2024-12-05

## 2024-12-05 ENCOUNTER — HOSPITAL ENCOUNTER (EMERGENCY)
Facility: CLINIC | Age: 29
DRG: 897 | End: 2024-12-05
Attending: FAMILY MEDICINE
Payer: COMMERCIAL

## 2024-12-05 VITALS
SYSTOLIC BLOOD PRESSURE: 158 MMHG | TEMPERATURE: 98.5 F | HEART RATE: 121 BPM | DIASTOLIC BLOOD PRESSURE: 75 MMHG | RESPIRATION RATE: 18 BRPM | OXYGEN SATURATION: 99 %

## 2024-12-05 DIAGNOSIS — F10.229 ALCOHOL DEPENDENCE WITH INTOXICATION WITH COMPLICATION (H): ICD-10-CM
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
## 2024-12-05 DIAGNOSIS — F11.23 OPIOID DEPENDENCE WITH WITHDRAWAL (H): ICD-10-CM

## 2024-12-05 DIAGNOSIS — F32.A DEPRESSION, UNSPECIFIED DEPRESSION TYPE: ICD-10-CM

## 2024-12-05 LAB
ALBUMIN SERPL BCG-MCNC: 4.9 G/DL (ref 3.5–5.2)
ALCOHOL BREATH TEST: 0.26 (ref 0–0.01)
ALP SERPL-CCNC: 166 U/L (ref 40–150)
ALT SERPL W P-5'-P-CCNC: 45 U/L (ref 0–70)
AMPHETAMINES UR QL SCN: ABNORMAL
ANION GAP SERPL CALCULATED.3IONS-SCNC: 29 MMOL/L (ref 7–15)
AST SERPL W P-5'-P-CCNC: 109 U/L (ref 0–45)
BARBITURATES UR QL SCN: ABNORMAL
BASOPHILS # BLD AUTO: 0 10E3/UL (ref 0–0.2)
BASOPHILS NFR BLD AUTO: 0 %
BENZODIAZ UR QL SCN: ABNORMAL
BILIRUB SERPL-MCNC: 0.4 MG/DL
BUN SERPL-MCNC: 16.9 MG/DL (ref 6–20)
BZE UR QL SCN: ABNORMAL
CALCIUM SERPL-MCNC: 8.8 MG/DL (ref 8.8–10.4)
CANNABINOIDS UR QL SCN: ABNORMAL
CHLORIDE SERPL-SCNC: 96 MMOL/L (ref 98–107)
CREAT SERPL-MCNC: 1.02 MG/DL (ref 0.67–1.17)
EGFRCR SERPLBLD CKD-EPI 2021: >90 ML/MIN/1.73M2
EOSINOPHIL # BLD AUTO: 0 10E3/UL (ref 0–0.7)
EOSINOPHIL NFR BLD AUTO: 0 %
ERYTHROCYTE [DISTWIDTH] IN BLOOD BY AUTOMATED COUNT: 14.9 % (ref 10–15)
ETHANOL SERPL-MCNC: 0.39 G/DL
FENTANYL UR QL: ABNORMAL
GLUCOSE SERPL-MCNC: 149 MG/DL (ref 70–99)
HCO3 SERPL-SCNC: 12 MMOL/L (ref 22–29)
HCT VFR BLD AUTO: 44.5 % (ref 40–53)
HGB BLD-MCNC: 15.5 G/DL (ref 13.3–17.7)
HOLD SPECIMEN: NORMAL
HOLD SPECIMEN: NORMAL
IMM GRANULOCYTES # BLD: 0.1 10E3/UL
IMM GRANULOCYTES NFR BLD: 0 %
LIPASE SERPL-CCNC: 56 U/L (ref 13–60)
LYMPHOCYTES # BLD AUTO: 1.4 10E3/UL (ref 0.8–5.3)
LYMPHOCYTES NFR BLD AUTO: 11 %
MAGNESIUM SERPL-MCNC: 2.1 MG/DL (ref 1.7–2.3)
MCH RBC QN AUTO: 29.4 PG (ref 26.5–33)
MCHC RBC AUTO-ENTMCNC: 34.8 G/DL (ref 31.5–36.5)
MCV RBC AUTO: 84 FL (ref 78–100)
MONOCYTES # BLD AUTO: 0.6 10E3/UL (ref 0–1.3)
MONOCYTES NFR BLD AUTO: 5 %
NEUTROPHILS # BLD AUTO: 10.5 10E3/UL (ref 1.6–8.3)
NEUTROPHILS NFR BLD AUTO: 84 %
NRBC # BLD AUTO: 0 10E3/UL
NRBC BLD AUTO-RTO: 0 /100
OPIATES UR QL SCN: ABNORMAL
PCP QUAL URINE (ROCHE): ABNORMAL
PLATELET # BLD AUTO: 206 10E3/UL (ref 150–450)
POTASSIUM SERPL-SCNC: 4.4 MMOL/L (ref 3.4–5.3)
PROT SERPL-MCNC: 8.4 G/DL (ref 6.4–8.3)
RBC # BLD AUTO: 5.28 10E6/UL (ref 4.4–5.9)
SODIUM SERPL-SCNC: 137 MMOL/L (ref 135–145)
WBC # BLD AUTO: 12.6 10E3/UL (ref 4–11)

## 2024-12-05 PROCEDURE — 83735 ASSAY OF MAGNESIUM: CPT | Performed by: FAMILY MEDICINE

## 2024-12-05 PROCEDURE — 250N000011 HC RX IP 250 OP 636: Performed by: FAMILY MEDICINE

## 2024-12-05 PROCEDURE — 84443 ASSAY THYROID STIM HORMONE: CPT | Performed by: CLINICAL NURSE SPECIALIST

## 2024-12-05 PROCEDURE — 250N000011 HC RX IP 250 OP 636: Performed by: INTERNAL MEDICINE

## 2024-12-05 PROCEDURE — 99285 EMERGENCY DEPT VISIT HI MDM: CPT | Mod: 25 | Performed by: FAMILY MEDICINE

## 2024-12-05 PROCEDURE — 96361 HYDRATE IV INFUSION ADD-ON: CPT | Performed by: FAMILY MEDICINE

## 2024-12-05 PROCEDURE — 99285 EMERGENCY DEPT VISIT HI MDM: CPT | Performed by: FAMILY MEDICINE

## 2024-12-05 PROCEDURE — 85004 AUTOMATED DIFF WBC COUNT: CPT | Performed by: FAMILY MEDICINE

## 2024-12-05 PROCEDURE — 80307 DRUG TEST PRSMV CHEM ANLYZR: CPT | Performed by: FAMILY MEDICINE

## 2024-12-05 PROCEDURE — 83690 ASSAY OF LIPASE: CPT | Performed by: FAMILY MEDICINE

## 2024-12-05 PROCEDURE — 258N000003 HC RX IP 258 OP 636: Performed by: FAMILY MEDICINE

## 2024-12-05 PROCEDURE — 250N000013 HC RX MED GY IP 250 OP 250 PS 637: Performed by: FAMILY MEDICINE

## 2024-12-05 PROCEDURE — 36415 COLL VENOUS BLD VENIPUNCTURE: CPT | Performed by: FAMILY MEDICINE

## 2024-12-05 PROCEDURE — 82075 ASSAY OF BREATH ETHANOL: CPT | Performed by: FAMILY MEDICINE

## 2024-12-05 PROCEDURE — 82977 ASSAY OF GGT: CPT | Performed by: CLINICAL NURSE SPECIALIST

## 2024-12-05 PROCEDURE — 83036 HEMOGLOBIN GLYCOSYLATED A1C: CPT | Performed by: CLINICAL NURSE SPECIALIST

## 2024-12-05 PROCEDURE — 250N000013 HC RX MED GY IP 250 OP 250 PS 637: Performed by: INTERNAL MEDICINE

## 2024-12-05 PROCEDURE — 96374 THER/PROPH/DIAG INJ IV PUSH: CPT | Performed by: FAMILY MEDICINE

## 2024-12-05 PROCEDURE — 80053 COMPREHEN METABOLIC PANEL: CPT | Performed by: FAMILY MEDICINE

## 2024-12-05 PROCEDURE — 82077 ASSAY SPEC XCP UR&BREATH IA: CPT | Performed by: FAMILY MEDICINE

## 2024-12-05 PROCEDURE — 250N000012 HC RX MED GY IP 250 OP 636 PS 637: Performed by: FAMILY MEDICINE

## 2024-12-05 RX ORDER — MULTIPLE VITAMINS W/ MINERALS TAB 9MG-400MCG
1 TAB ORAL DAILY
Status: DISCONTINUED | OUTPATIENT
Start: 2024-12-05 | End: 2024-12-08 | Stop reason: HOSPADM

## 2024-12-05 RX ORDER — ONDANSETRON 2 MG/ML
4 INJECTION INTRAMUSCULAR; INTRAVENOUS ONCE
Status: COMPLETED | OUTPATIENT
Start: 2024-12-05 | End: 2024-12-05

## 2024-12-05 RX ORDER — FOLIC ACID 1 MG/1
1 TABLET ORAL DAILY
Status: DISCONTINUED | OUTPATIENT
Start: 2024-12-05 | End: 2024-12-08 | Stop reason: HOSPADM

## 2024-12-05 RX ORDER — CLONIDINE HYDROCHLORIDE 0.1 MG/1
.1-.2 TABLET ORAL 2 TIMES DAILY PRN
Status: DISCONTINUED | OUTPATIENT
Start: 2024-12-05 | End: 2024-12-08 | Stop reason: HOSPADM

## 2024-12-05 RX ORDER — GABAPENTIN 300 MG/1
900-1200 CAPSULE ORAL AT BEDTIME
Status: DISCONTINUED | OUTPATIENT
Start: 2024-12-05 | End: 2024-12-08 | Stop reason: HOSPADM

## 2024-12-05 RX ORDER — ONDANSETRON 4 MG/1
4 TABLET, ORALLY DISINTEGRATING ORAL ONCE
Status: COMPLETED | OUTPATIENT
Start: 2024-12-05 | End: 2024-12-05

## 2024-12-05 RX ORDER — LORAZEPAM 2 MG/1
2 TABLET ORAL ONCE
Status: COMPLETED | OUTPATIENT
Start: 2024-12-05 | End: 2024-12-05

## 2024-12-05 RX ORDER — BUPRENORPHINE AND NALOXONE 8; 2 MG/1; MG/1
1 FILM, SOLUBLE BUCCAL; SUBLINGUAL DAILY
Status: DISCONTINUED | OUTPATIENT
Start: 2024-12-05 | End: 2024-12-08 | Stop reason: HOSPADM

## 2024-12-05 RX ORDER — DIAZEPAM 5 MG/1
5-20 TABLET ORAL EVERY 30 MIN PRN
Status: DISCONTINUED | OUTPATIENT
Start: 2024-12-05 | End: 2024-12-08 | Stop reason: HOSPADM

## 2024-12-05 RX ADMIN — NICOTINE POLACRILEX 4 MG: 4 GUM, CHEWING BUCCAL at 17:51

## 2024-12-05 RX ADMIN — NICOTINE POLACRILEX 4 MG: 4 GUM, CHEWING BUCCAL at 21:45

## 2024-12-05 RX ADMIN — Medication 1 TABLET: at 14:53

## 2024-12-05 RX ADMIN — CLONIDINE HYDROCHLORIDE 0.2 MG: 0.1 TABLET ORAL at 22:32

## 2024-12-05 RX ADMIN — LORAZEPAM 2 MG: 2 TABLET ORAL at 23:50

## 2024-12-05 RX ADMIN — DIAZEPAM 15 MG: 5 TABLET ORAL at 23:33

## 2024-12-05 RX ADMIN — DIAZEPAM 10 MG: 5 TABLET ORAL at 18:58

## 2024-12-05 RX ADMIN — BUPRENORPHINE AND NALOXONE 1 FILM: 8; 2 FILM BUCCAL; SUBLINGUAL at 15:49

## 2024-12-05 RX ADMIN — SODIUM CHLORIDE 1000 ML: 9 INJECTION, SOLUTION INTRAVENOUS at 15:49

## 2024-12-05 RX ADMIN — ONDANSETRON 4 MG: 4 TABLET, ORALLY DISINTEGRATING ORAL at 14:08

## 2024-12-05 RX ADMIN — DIAZEPAM 5 MG: 5 TABLET ORAL at 16:44

## 2024-12-05 RX ADMIN — ONDANSETRON 4 MG: 4 TABLET, ORALLY DISINTEGRATING ORAL at 22:20

## 2024-12-05 RX ADMIN — GABAPENTIN 900 MG: 300 CAPSULE ORAL at 22:32

## 2024-12-05 RX ADMIN — THIAMINE HCL TAB 100 MG 100 MG: 100 TAB at 14:53

## 2024-12-05 RX ADMIN — DIAZEPAM 5 MG: 5 TABLET ORAL at 16:43

## 2024-12-05 RX ADMIN — ONDANSETRON 4 MG: 2 INJECTION INTRAMUSCULAR; INTRAVENOUS at 15:51

## 2024-12-05 RX ADMIN — FOLIC ACID 1 MG: 1 TABLET ORAL at 14:53

## 2024-12-05 RX ADMIN — DIAZEPAM 10 MG: 5 TABLET ORAL at 19:58

## 2024-12-05 ASSESSMENT — ACTIVITIES OF DAILY LIVING (ADL)
ADLS_ACUITY_SCORE: 41

## 2024-12-05 ASSESSMENT — COLUMBIA-SUICIDE SEVERITY RATING SCALE - C-SSRS
1. IN THE PAST MONTH, HAVE YOU WISHED YOU WERE DEAD OR WISHED YOU COULD GO TO SLEEP AND NOT WAKE UP?: YES
6. HAVE YOU EVER DONE ANYTHING, STARTED TO DO ANYTHING, OR PREPARED TO DO ANYTHING TO END YOUR LIFE?: NO
4. HAVE YOU HAD THESE THOUGHTS AND HAD SOME INTENTION OF ACTING ON THEM?: YES
3. HAVE YOU BEEN THINKING ABOUT HOW YOU MIGHT KILL YOURSELF?: YES
5. HAVE YOU STARTED TO WORK OUT OR WORKED OUT THE DETAILS OF HOW TO KILL YOURSELF? DO YOU INTEND TO CARRY OUT THIS PLAN?: NO
2. HAVE YOU ACTUALLY HAD ANY THOUGHTS OF KILLING YOURSELF IN THE PAST MONTH?: YES

## 2024-12-05 NOTE — TELEPHONE ENCOUNTER
S: [facility] , Provider Christianson calling at 5:07 PM   with clinical on a 29 year old/Male presenting for alcohol detox.     B: Pt presents for ETOH detox.   Currently reports drinking  liter and half daily vodka  for Months.    Patient reports last use was prior to arrival .  Pt JASON: .39  Pt  denies hx of DT  Pt  denies hx of seizures. Last seizure:  Over 1 year   Pt endorsing the following symptoms of withdrawal: Vomiting  MSSA Score: N/A     Pt denies acute mental health or medical concerns.   Pt denies other drug use: None Amount/frequency: N/A    Does Pt have a detox care plan in Epic? N/A  Does pt present with specific needs, assistive devices, or exclusionary criteria? None  Is the patient ambulating, eating and drinking in the ED? yes    A: Pt meets criteria to be presented for IP detox admission. Patient is voluntary    COVID Symptoms: No  If yes, COVID test required   Utox: Ordered, not yet collected  Magnesium: WNL  CMP: WNL  CBC: WNL  HCG: N/A     R: Patient cleared and ready for behavioral bed placement: Yes    Pt is meeting criteria for presentation to 3A/CD    Does Patient need a Transfer Center request created? No, Pt is located within [facility] ED, [facility] ED, or [facility]     5:19 PM Paged Ramila to review for unit 3A/CD/Ashley

## 2024-12-05 NOTE — ED TRIAGE NOTES
Per EMS: EMS came to patient's house, sister called stating he was drinking too much and becoming a danger to self-hitting self in face, being aggressive. When PD came, patient stated saying he is suicidal. Patient was screaming and being aggressive in rig. Told EMS he takes suboxone, is nauseous.   Patient said he drank 0.75 vodka today, as is his regular daily intake. Patient is voluntary

## 2024-12-05 NOTE — ED PROVIDER NOTES
[facility] EMERGENCY DEPARTMENT ([facility])    12/05/24      ED PROVIDER NOTE   History     Chief Complaint   Patient presents with    Suicidal     Thoughts with no plan    Alcohol Intoxication     Drinks 1.75 daily.  History of seizure withdrawals     The history is provided by the patient and medical records.     Hemanth Hurd is a 29 year old male with history of alcohol use disorder, opiate use disorder (was on Suboxone strips x 12-3 mg twice daily, last prescription given in September 2024) who presents to the emergency department with suicidal ideation in setting of alcohol use disorder.  He reports drinking 1.75 L of liquor daily.  He last drank about 2 hours prior to arrival.  He notes prior history of alcohol withdrawal seizures.  Patient has been off Suboxone x 3 days. He is having dry heaving, nausea.     Past Medical History  Past Medical History:   Diagnosis Date    History of alcohol abuse     History of benzodiazepine use     History of opiate therapy      No past surgical history on file.  Buprenorphine HCl-Naloxone HCl (SUBOXONE) 12-3 MG FILM per film  acamprosate (CAMPRAL) 333 MG EC tablet  cloNIDine (CATAPRES) 0.1 MG tablet  gabapentin (NEURONTIN) 300 MG capsule  modafinil (PROVIGIL) 100 MG tablet  naloxone (NARCAN) 4 MG/0.1ML nasal spray  QUEtiapine (SEROQUEL) 50 MG tablet  sertraline (ZOLOFT) 100 MG tablet      No Known Allergies  Family History  Family History   Problem Relation Age of Onset    Rheumatoid Arthritis Mother     Mental Illness Father      Social History   Social History     Tobacco Use    Smoking status: Former     Types: Cigarettes    Smokeless tobacco: Former     Types: Chew     Quit date: 12/2021    Tobacco comments:     Only when unable to vape   Vaping Use    Vaping status: Every Day    Substances: Nicotine, Flavoring    Devices: Refillable tank   Substance Use Topics    Alcohol use: Not Currently     Comment: hx of abuse    Drug use: Not Currently     Types:  Benzodiazepines, Opiates      A medically appropriate review of systems was performed with pertinent positives and negatives noted in the HPI, and all other systems negative.    Physical Exam   BP: (!) 161/105  Pulse: (!) 121  Temp: 98.3  F (36.8  C)  Resp: 16  SpO2: 95 %  Physical Exam  Constitutional:       General: He is not in acute distress.     Appearance: Normal appearance. He is not toxic-appearing.   HENT:      Head: Atraumatic.   Eyes:      General: No scleral icterus.     Conjunctiva/sclera: Conjunctivae normal.   Cardiovascular:      Rate and Rhythm: Normal rate.      Heart sounds: Normal heart sounds.   Pulmonary:      Effort: Pulmonary effort is normal. No respiratory distress.      Breath sounds: Normal breath sounds.   Abdominal:      Palpations: Abdomen is soft.      Tenderness: There is no abdominal tenderness.   Musculoskeletal:         General: No deformity.      Cervical back: Neck supple.   Skin:     General: Skin is warm.   Neurological:      General: No focal deficit present.      Mental Status: He is alert and oriented to person, place, and time.      Sensory: No sensory deficit.      Motor: No weakness.      Coordination: Coordination normal.   Psychiatric:         Mood and Affect: Mood is anxious.         Speech: Speech is slurred.         Behavior: Behavior is cooperative.         Thought Content: Thought content includes suicidal ideation. Thought content does not include suicidal plan.           ED Course, Procedures, & Data     ED Course as of 12/05/24 1721   Thu Dec 05, 2024   1533 Patient still having dry heaves and reported being off his Suboxone for 3 days.  Ordering a dose of Suboxone for patient.     Procedures       Results for orders placed or performed during the hospital encounter of 12/05/24   [place] Draw     Status: None    Narrative    The following orders were created for panel order [place] Draw.  Procedure                               Abnormality         Status                      ---------                               -----------         ------                     Extra Green Top (Lithium...[[id number]]                      Final result               Extra Purple Top Tube[[id number]]                            Final result                 Please view results for these tests on the individual orders.   Extra Green Top (Lithium Heparin) Tube     Status: None   Result Value Ref Range    Hold Specimen [facility]    Extra Purple Top Tube     Status: None   Result Value Ref Range    Hold Specimen [facility]    Comprehensive metabolic panel     Status: Abnormal   Result Value Ref Range    Sodium 137 135 - 145 mmol/L    Potassium 4.4 3.4 - 5.3 mmol/L    Carbon Dioxide (CO2) 12 (L) 22 - 29 mmol/L    Anion Gap 29 (H) 7 - 15 mmol/L    Urea Nitrogen 16.9 6.0 - 20.0 mg/dL    Creatinine 1.02 0.67 - 1.17 mg/dL    GFR Estimate >90 >60 mL/min/1.73m2    Calcium 8.8 8.8 - 10.4 mg/dL    Chloride 96 (L) 98 - 107 mmol/L    Glucose 149 (H) 70 - 99 mg/dL    Alkaline Phosphatase 166 (H) 40 - 150 U/L     (H) 0 - 45 U/L    ALT 45 0 - 70 U/L    Protein Total 8.4 (H) 6.4 - 8.3 g/dL    Albumin 4.9 3.5 - 5.2 g/dL    Bilirubin Total 0.4 <=1.2 mg/dL   Lipase     Status: Normal   Result Value Ref Range    Lipase 56 13 - 60 U/L   Magnesium     Status: Normal   Result Value Ref Range    Magnesium 2.1 1.7 - 2.3 mg/dL   Ethyl Alcohol Level     Status: Abnormal   Result Value Ref Range    Alcohol ethyl 0.39 (HH) <=0.01 g/dL   CBC with platelets and differential     Status: Abnormal   Result Value Ref Range    WBC Count 12.6 (H) 4.0 - 11.0 10e3/uL    RBC Count 5.28 4.40 - 5.90 10e6/uL    Hemoglobin 15.5 13.3 - 17.7 g/dL    Hematocrit 44.5 40.0 - 53.0 %    MCV 84 78 - 100 fL    MCH 29.4 26.5 - 33.0 pg    MCHC 34.8 31.5 - 36.5 g/dL    RDW 14.9 10.0 - 15.0 %    Platelet Count 206 150 - 450 10e3/uL    % Neutrophils 84 %    % Lymphocytes 11 %    % Monocytes 5 %    % Eosinophils 0 %    % Basophils 0 %    % Immature Granulocytes 0 %     NRBCs per 100 WBC 0 <1 /100    Absolute Neutrophils 10.5 (H) 1.6 - 8.3 10e3/uL    Absolute Lymphocytes 1.4 0.8 - 5.3 10e3/uL    Absolute Monocytes 0.6 0.0 - 1.3 10e3/uL    Absolute Eosinophils 0.0 0.0 - 0.7 10e3/uL    Absolute Basophils 0.0 0.0 - 0.2 10e3/uL    Absolute Immature Granulocytes 0.1 <=0.4 10e3/uL    Absolute NRBCs 0.0 10e3/uL   Alcohol breath test POCT     Status: Abnormal   Result Value Ref Range    Alcohol Breath Test 0.26 (A) 0.00 - 0.01   CBC with platelets differential     Status: Abnormal    Narrative    The following orders were created for panel order CBC with platelets differential.  Procedure                               Abnormality         Status                     ---------                               -----------         ------                     CBC with platelets and d...[[id number]]  Abnormal            Final result                 Please view results for these tests on the individual orders.     Medications   diazepam (VALIUM) tablet 5-20 mg (5 mg Oral $Given 12/5/24 1644)   thiamine (B-1) tablet 100 mg (100 mg Oral $Given 12/5/24 1453)   folic acid (FOLVITE) tablet 1 mg (1 mg Oral $Given 12/5/24 1453)   multivitamin w/minerals (THERA-VIT-M) tablet 1 tablet (1 tablet Oral $Given 12/5/24 1453)   buprenorphine HCl-naloxone HCl (SUBOXONE) 8-2 MG per film 1 Film (1 Film Sublingual $Given 12/5/24 3259)   ondansetron (ZOFRAN ODT) ODT tab 4 mg (4 mg Oral $Given 12/5/24 1408)   sodium chloride 0.9% BOLUS 1,000 mL (1,000 mLs Intravenous $New Bag 12/5/24 1549)   ondansetron (ZOFRAN) injection 4 mg (4 mg Intravenous $Given 12/5/24 1551)     Labs Ordered and Resulted from Time of ED Arrival to Time of ED Departure   COMPREHENSIVE METABOLIC PANEL - Abnormal       Result Value    Sodium 137      Potassium 4.4      Carbon Dioxide (CO2) 12 (*)     Anion Gap 29 (*)     Urea Nitrogen 16.9      Creatinine 1.02      GFR Estimate >90      Calcium 8.8      Chloride 96 (*)     Glucose 149 (*)      Alkaline Phosphatase 166 (*)      (*)     ALT 45      Protein Total 8.4 (*)     Albumin 4.9      Bilirubin Total 0.4     ETHYL ALCOHOL LEVEL - Abnormal    Alcohol ethyl 0.39 (*)    CBC WITH PLATELETS AND DIFFERENTIAL - Abnormal    WBC Count 12.6 (*)     RBC Count 5.28      Hemoglobin 15.5      Hematocrit 44.5      MCV 84      MCH 29.4      MCHC 34.8      RDW 14.9      Platelet Count 206      % Neutrophils 84      % Lymphocytes 11      % Monocytes 5      % Eosinophils 0      % Basophils 0      % Immature Granulocytes 0      NRBCs per 100 WBC 0      Absolute Neutrophils 10.5 (*)     Absolute Lymphocytes 1.4      Absolute Monocytes 0.6      Absolute Eosinophils 0.0      Absolute Basophils 0.0      Absolute Immature Granulocytes 0.1      Absolute NRBCs 0.0     ALCOHOL BREATH TEST POCT - Abnormal    Alcohol Breath Test 0.26 (*)    LIPASE - Normal    Lipase 56     MAGNESIUM - Normal    Magnesium 2.1       No orders to display          Critical care was not performed.     Medical Decision Making  The patient's presentation was of high complexity (a chronic illness severe exacerbation, progression, or side effect of treatment).    The patient's evaluation involved:  ordering and/or review of 3+ test(s) in this encounter (see separate area of note for details)    The patient's management necessitated high risk (a decision regarding hospitalization).    Assessment & Plan        I have reviewed the nursing notes. I have reviewed the findings, diagnosis, plan and need for follow up with the patient.        Final diagnoses:   Alcohol dependence with intoxication with complication (H)   Opioid dependence with withdrawal (H)   Depression, unspecified depression type     I, Lakisha Vu, am serving as a trained medical scribe to document services personally performed by Tian Esquivel MD based on the provider's statements to me on December 5, 2024.  This document has been checked and approved by the attending  provider.    I, Tian Esquivel MD, was physically present and have reviewed and verified the accuracy of this note documented by Lakisha Vu, medical scribe.      Tian Esquivel MD   [facility] EMERGENCY DEPARTMENT  12/5/2024           Tian Esquivel MD  12/05/24 0392

## 2024-12-06 PROBLEM — F10.229 ALCOHOL DEPENDENCE WITH INTOXICATION WITH COMPLICATION (H): Status: ACTIVE | Noted: 2024-12-06

## 2024-12-06 PROBLEM — F32.A DEPRESSION, UNSPECIFIED DEPRESSION TYPE: Status: ACTIVE | Noted: 2024-12-06

## 2024-12-06 PROBLEM — F11.23 OPIOID DEPENDENCE WITH WITHDRAWAL (H): Status: ACTIVE | Noted: 2024-12-06

## 2024-12-06 LAB
EST. AVERAGE GLUCOSE BLD GHB EST-MCNC: 114 MG/DL
GGT SERPL-CCNC: 139 U/L (ref 8–61)
HBA1C MFR BLD: 5.6 %
TSH SERPL DL<=0.005 MIU/L-ACNC: 0.6 UIU/ML (ref 0.3–4.2)

## 2024-12-06 PROCEDURE — 250N000012 HC RX MED GY IP 250 OP 636 PS 637: Performed by: CLINICAL NURSE SPECIALIST

## 2024-12-06 PROCEDURE — 250N000011 HC RX IP 250 OP 636: Performed by: EMERGENCY MEDICINE

## 2024-12-06 PROCEDURE — 250N000012 HC RX MED GY IP 250 OP 636 PS 637: Performed by: FAMILY MEDICINE

## 2024-12-06 PROCEDURE — 250N000011 HC RX IP 250 OP 636: Performed by: STUDENT IN AN ORGANIZED HEALTH CARE EDUCATION/TRAINING PROGRAM

## 2024-12-06 PROCEDURE — 250N000013 HC RX MED GY IP 250 OP 250 PS 637: Performed by: INTERNAL MEDICINE

## 2024-12-06 PROCEDURE — 250N000011 HC RX IP 250 OP 636: Performed by: CLINICAL NURSE SPECIALIST

## 2024-12-06 PROCEDURE — 250N000013 HC RX MED GY IP 250 OP 250 PS 637: Performed by: EMERGENCY MEDICINE

## 2024-12-06 PROCEDURE — 250N000013 HC RX MED GY IP 250 OP 250 PS 637: Performed by: FAMILY MEDICINE

## 2024-12-06 PROCEDURE — 128N000004 HC R&B CD ADULT

## 2024-12-06 PROCEDURE — HZ2ZZZZ DETOXIFICATION SERVICES FOR SUBSTANCE ABUSE TREATMENT: ICD-10-PCS | Performed by: PSYCHIATRY & NEUROLOGY

## 2024-12-06 RX ORDER — BUPRENORPHINE 2 MG/1
2 TABLET SUBLINGUAL
Status: DISPENSED | OUTPATIENT
Start: 2024-12-06 | End: 2024-12-07

## 2024-12-06 RX ORDER — ONDANSETRON 4 MG/1
4 TABLET, ORALLY DISINTEGRATING ORAL ONCE
Status: COMPLETED | OUTPATIENT
Start: 2024-12-06 | End: 2024-12-06

## 2024-12-06 RX ORDER — MAGNESIUM HYDROXIDE/ALUMINUM HYDROXICE/SIMETHICONE 120; 1200; 1200 MG/30ML; MG/30ML; MG/30ML
30 SUSPENSION ORAL EVERY 4 HOURS PRN
Status: DISCONTINUED | OUTPATIENT
Start: 2024-12-06 | End: 2024-12-08 | Stop reason: HOSPADM

## 2024-12-06 RX ORDER — CLONIDINE HYDROCHLORIDE 0.1 MG/1
0.1 TABLET ORAL EVERY 6 HOURS PRN
Status: DISCONTINUED | OUTPATIENT
Start: 2024-12-06 | End: 2024-12-06

## 2024-12-06 RX ORDER — AMOXICILLIN 250 MG
1 CAPSULE ORAL 2 TIMES DAILY PRN
Status: DISCONTINUED | OUTPATIENT
Start: 2024-12-06 | End: 2024-12-08 | Stop reason: HOSPADM

## 2024-12-06 RX ORDER — ONDANSETRON 4 MG/1
4 TABLET, ORALLY DISINTEGRATING ORAL EVERY 6 HOURS PRN
Status: DISCONTINUED | OUTPATIENT
Start: 2024-12-06 | End: 2024-12-08 | Stop reason: HOSPADM

## 2024-12-06 RX ORDER — TRAZODONE HYDROCHLORIDE 50 MG/1
50 TABLET, FILM COATED ORAL
Status: DISCONTINUED | OUTPATIENT
Start: 2024-12-06 | End: 2024-12-08 | Stop reason: HOSPADM

## 2024-12-06 RX ORDER — MULTIVITAMIN,THERAPEUTIC
1 TABLET ORAL ONCE
Status: COMPLETED | OUTPATIENT
Start: 2024-12-06 | End: 2024-12-06

## 2024-12-06 RX ORDER — THIAMINE HYDROCHLORIDE 100 MG/ML
100 INJECTION, SOLUTION INTRAMUSCULAR; INTRAVENOUS ONCE
Status: COMPLETED | OUTPATIENT
Start: 2024-12-06 | End: 2024-12-06

## 2024-12-06 RX ORDER — HYDROXYZINE HYDROCHLORIDE 25 MG/1
25 TABLET, FILM COATED ORAL EVERY 4 HOURS PRN
Status: DISCONTINUED | OUTPATIENT
Start: 2024-12-06 | End: 2024-12-08 | Stop reason: HOSPADM

## 2024-12-06 RX ORDER — BUPRENORPHINE 2 MG/1
2 TABLET SUBLINGUAL 4 TIMES DAILY
Status: DISCONTINUED | OUTPATIENT
Start: 2024-12-07 | End: 2024-12-06

## 2024-12-06 RX ORDER — LOPERAMIDE HYDROCHLORIDE 2 MG/1
2 CAPSULE ORAL EVERY 4 HOURS PRN
Status: DISCONTINUED | OUTPATIENT
Start: 2024-12-06 | End: 2024-12-08 | Stop reason: HOSPADM

## 2024-12-06 RX ORDER — ACETAMINOPHEN 325 MG/1
650 TABLET ORAL EVERY 4 HOURS PRN
Status: DISCONTINUED | OUTPATIENT
Start: 2024-12-06 | End: 2024-12-08 | Stop reason: HOSPADM

## 2024-12-06 RX ORDER — LORAZEPAM 2 MG/ML
2 INJECTION INTRAMUSCULAR ONCE
Status: COMPLETED | OUTPATIENT
Start: 2024-12-06 | End: 2024-12-06

## 2024-12-06 RX ORDER — LORAZEPAM 2 MG/1
2 TABLET ORAL ONCE
Status: COMPLETED | OUTPATIENT
Start: 2024-12-06 | End: 2024-12-06

## 2024-12-06 RX ORDER — FOLIC ACID 5 MG/ML
1 INJECTION, SOLUTION INTRAMUSCULAR; INTRAVENOUS; SUBCUTANEOUS ONCE
Status: COMPLETED | OUTPATIENT
Start: 2024-12-06 | End: 2024-12-06

## 2024-12-06 RX ADMIN — GABAPENTIN 900 MG: 300 CAPSULE ORAL at 22:11

## 2024-12-06 RX ADMIN — DIAZEPAM 5 MG: 5 TABLET ORAL at 09:12

## 2024-12-06 RX ADMIN — BUPRENORPHINE HCL 2 MG: 2 TABLET SUBLINGUAL at 23:12

## 2024-12-06 RX ADMIN — DIAZEPAM 10 MG: 5 TABLET ORAL at 07:58

## 2024-12-06 RX ADMIN — DIAZEPAM 5 MG: 5 TABLET ORAL at 04:11

## 2024-12-06 RX ADMIN — DIAZEPAM 10 MG: 5 TABLET ORAL at 18:07

## 2024-12-06 RX ADMIN — NICOTINE POLACRILEX 4 MG: 4 GUM, CHEWING BUCCAL at 23:33

## 2024-12-06 RX ADMIN — Medication 1 TABLET: at 07:58

## 2024-12-06 RX ADMIN — DIAZEPAM 5 MG: 5 TABLET ORAL at 05:12

## 2024-12-06 RX ADMIN — CLONIDINE HYDROCHLORIDE 0.2 MG: 0.1 TABLET ORAL at 18:07

## 2024-12-06 RX ADMIN — CLONIDINE HYDROCHLORIDE 0.2 MG: 0.1 TABLET ORAL at 07:57

## 2024-12-06 RX ADMIN — DIAZEPAM 5 MG: 5 TABLET ORAL at 11:54

## 2024-12-06 RX ADMIN — THIAMINE HYDROCHLORIDE 100 MG: 100 INJECTION, SOLUTION INTRAMUSCULAR; INTRAVENOUS at 03:01

## 2024-12-06 RX ADMIN — FOLIC ACID 1 MG: 5 INJECTION, SOLUTION INTRAMUSCULAR; INTRAVENOUS; SUBCUTANEOUS at 02:46

## 2024-12-06 RX ADMIN — DIAZEPAM 5 MG: 5 TABLET ORAL at 06:37

## 2024-12-06 RX ADMIN — THERA TABS 1 TABLET: TAB at 03:01

## 2024-12-06 RX ADMIN — FOLIC ACID 1 MG: 1 TABLET ORAL at 07:58

## 2024-12-06 RX ADMIN — ONDANSETRON 4 MG: 4 TABLET, ORALLY DISINTEGRATING ORAL at 18:07

## 2024-12-06 RX ADMIN — BUPRENORPHINE HCL 2 MG: 2 TABLET SUBLINGUAL at 20:47

## 2024-12-06 RX ADMIN — BUPRENORPHINE AND NALOXONE 1 FILM: 8; 2 FILM BUCCAL; SUBLINGUAL at 07:58

## 2024-12-06 RX ADMIN — THIAMINE HCL TAB 100 MG 100 MG: 100 TAB at 07:58

## 2024-12-06 RX ADMIN — LORAZEPAM 2 MG: 2 TABLET ORAL at 02:10

## 2024-12-06 RX ADMIN — DIAZEPAM 10 MG: 5 TABLET ORAL at 03:15

## 2024-12-06 RX ADMIN — DIAZEPAM 5 MG: 5 TABLET ORAL at 14:36

## 2024-12-06 RX ADMIN — LORAZEPAM 2 MG: 2 INJECTION INTRAMUSCULAR; INTRAVENOUS at 02:32

## 2024-12-06 RX ADMIN — DIAZEPAM 10 MG: 5 TABLET ORAL at 20:47

## 2024-12-06 RX ADMIN — ONDANSETRON 4 MG: 4 TABLET, ORALLY DISINTEGRATING ORAL at 07:58

## 2024-12-06 ASSESSMENT — ACTIVITIES OF DAILY LIVING (ADL)
ADLS_ACUITY_SCORE: 41
ADLS_ACUITY_SCORE: 15
ADLS_ACUITY_SCORE: 41
ADLS_ACUITY_SCORE: 15
ADLS_ACUITY_SCORE: 41
ADLS_ACUITY_SCORE: 15
ADLS_ACUITY_SCORE: 41
ADLS_ACUITY_SCORE: 15
ADLS_ACUITY_SCORE: 41
ADLS_ACUITY_SCORE: 41

## 2024-12-06 ASSESSMENT — LIFESTYLE VARIABLES: SKIP TO QUESTIONS 9-10: 0

## 2024-12-06 NOTE — PROGRESS NOTES
Attempted to call report. Per charge RN patient requires further stabilization before coming to 3a. They will notify us when he is ready.

## 2024-12-06 NOTE — PROGRESS NOTES
12/06/24 9728   Patient Belongings   Did you bring any home meds/supplements to the hospital?  No   Patient Belongings other (see comments)   Belongings Search Yes   Clothing Search Yes   Second Staff Debbie Vicente     MED ROOM BIN:   Cell phone    PATIENT CAME WITH ONLY: CELL PHONE, RED PLAID PANTS, JEANS, T-JEFFERY  A             ADMISSION:    I am responsible for any personal items that are not sent to the safe or pharmacy. [facility] is not responsible for loss, theft or damage of any property in my possession.    Patient Signature _________________________________________ Date/Time _____________________    Staff Signature ___________________________________________ Date/Time _____________________    2nd Staff person, if patient is unable/unwilling to sign    ________________________________________________________ Date/Time _____________________    DISCHARGE:    All personal items have been returned to me.    Patient Signature _________________________________________ Date/Time _____________________    Staff Signature ___________________________________________ Date/Time _____________________

## 2024-12-06 NOTE — ED NOTES
MSSA performed at 2236 and pt scored 13. Pt wants to try Clonidine instead of the PRN Valium and states it will help with his anxiety and withdrawal.

## 2024-12-06 NOTE — ED NOTES
Late entry for 12/06 1:30 am    RN went to patient's room for re-assessment of MSSA, pt seen restless, anxious and vomited twice. VS done. MSSA score of 21. Patient claims that valium is not helping with his symptoms and prefers other medicine to help with his symptoms. MD was informed and asked if patient can have a dose of Ativan IV because patient is vomiting and will not tolerate oral. MD told RN to try another dose of oral Lorazepam instead of IV.  Patient was given oral Ativan 2 mg. After few minutes, patient's symptoms are worsening, informed charge to transfer to medical room. Night shift MD was informed of patient's current situation

## 2024-12-06 NOTE — ED PROVIDER NOTES
Patient is boarding in the ED awaiting detox admission.  I was approached by the nurse who was concerned for patient's severe withdrawal despite oral Valium.  Had reportedly been switched from Valium to Ativan by the previous provider.  My assessment, patient does have significant tremor/shakiness and is hypertensive/diaphoretic, overall consistent with withdrawal despite oral benzodiazepines.  He is oriented and does not have evidence of DTs currently.  He was given a one-time dose of 2 mg IV Ativan which significantly improved his symptoms.  Initially pursued ICU bed placement but unfortunately there are no beds available at our hospital or anything in/out of system and the entire stay, according to patient placement.  Throughout the night shift, and patient was able to be retransition back to oral benzodiazepines.  Will continue w/ plan for detox admission     Abdon Alvarado,   12/07/24 0122

## 2024-12-06 NOTE — TELEPHONE ENCOUNTER
R: Provider Ramila accepts pt for 3A/Ashley/CD    6:40 PM pt added to unit queue, unit called with disposition    6:47 PM ED updated with pt placement

## 2024-12-06 NOTE — PROGRESS NOTES
Discussed pt condition with ER Charge and they are working on stabilizing him further prior to sending him to 3A. Charge reports that he is still a candidate for 3A. Asked for pt to be tolerating PO and for labs to be rechecked. Have been awaiting update.

## 2024-12-06 NOTE — ED PROVIDER NOTES
Emergency Department I-PASS Sign-out  29-year-old male with a history of alcohol use disorder, opiate use disorder presenting initially to the emergency department yesterday in the setting of alcohol withdrawal and being off Suboxone for a number of days.  Was having nausea and vomiting at the time of arrival.    Lab work demonstrated elevated anion gap metabolic acidosis and hypomagnesemia.  This was repleted.  Plan for him to go to detox on 3A.  Lab work showed his elevated alcohol level of 0.39 at the time of his labs likely explaining his elevated anion gap metabolic acidosis.  There was a question this morning about whether he was still going to go to detox or to medicine.  After further conversations it seems as though with a plan to continue to take him to detox.    Kya Dubose MD   Emergency Medicine     Kya Dubose MD  12/06/24 3469

## 2024-12-06 NOTE — ED NOTES
Pt has been sleeping for 4.5 hours. Woke up briefly while I just did his lab draw, but would like to resume sleeping. Pt prefers to sleep with the lights on. Pt prefers female nurses as stated by pt.

## 2024-12-06 NOTE — PHARMACY-ADMISSION MEDICATION HISTORY
Pharmacist Admission Medication History    Admission medication history is complete. The information provided in this note is only as accurate as the sources available at the time of the update.    Information Source(s): Patient via in-person    Pertinent Information: n/a    Changes made to PTA medication list:  Added: None  Deleted: acamprosate  Changed: modafinil daily to PRN     Allergies reviewed with patient and updates made in EHR: yes    Medication History Completed By: [facility] 12/5/2024 8:46 PM    PTA Med List   Medication Sig Last Dose/Taking    Buprenorphine HCl-Naloxone HCl (SUBOXONE) 12-3 MG FILM per film Place 1 Film under the tongue 2 times daily. 12/2/2024    cloNIDine (CATAPRES) 0.1 MG tablet Take 1-2 tablets (0.1-0.2 mg) by mouth 2 times daily as needed (withdrawal symptoms, anxiety). 12/4/2024    gabapentin (NEURONTIN) 300 MG capsule Take 3-4 capsules (900-1,200 mg) by mouth at bedtime. Past Week    modafinil (PROVIGIL) 100 MG tablet Take 100 mg by mouth daily as needed (narcolepsy). More than a month    naloxone (NARCAN) 4 MG/0.1ML nasal spray 4 mg once as needed for opioid reversal. Taking As Needed    QUEtiapine (SEROQUEL) 50 MG tablet Take 1 tablet (50 mg) by mouth 2 times daily. Past Week    sertraline (ZOLOFT) 100 MG tablet Take 1 tablet (100 mg) by mouth daily Past Week

## 2024-12-07 PROCEDURE — 250N000013 HC RX MED GY IP 250 OP 250 PS 637: Performed by: PSYCHIATRY & NEUROLOGY

## 2024-12-07 PROCEDURE — 250N000011 HC RX IP 250 OP 636: Performed by: CLINICAL NURSE SPECIALIST

## 2024-12-07 PROCEDURE — 99222 1ST HOSP IP/OBS MODERATE 55: CPT | Performed by: PSYCHIATRY & NEUROLOGY

## 2024-12-07 PROCEDURE — 93010 ELECTROCARDIOGRAM REPORT: CPT | Performed by: INTERNAL MEDICINE

## 2024-12-07 PROCEDURE — 250N000013 HC RX MED GY IP 250 OP 250 PS 637: Performed by: CLINICAL NURSE SPECIALIST

## 2024-12-07 PROCEDURE — 250N000013 HC RX MED GY IP 250 OP 250 PS 637: Performed by: PHYSICIAN ASSISTANT

## 2024-12-07 PROCEDURE — 250N000012 HC RX MED GY IP 250 OP 636 PS 637: Performed by: FAMILY MEDICINE

## 2024-12-07 PROCEDURE — 250N000012 HC RX MED GY IP 250 OP 636 PS 637: Performed by: PSYCHIATRY & NEUROLOGY

## 2024-12-07 PROCEDURE — 99222 1ST HOSP IP/OBS MODERATE 55: CPT | Performed by: PHYSICIAN ASSISTANT

## 2024-12-07 PROCEDURE — 250N000013 HC RX MED GY IP 250 OP 250 PS 637: Performed by: FAMILY MEDICINE

## 2024-12-07 PROCEDURE — 128N000004 HC R&B CD ADULT

## 2024-12-07 RX ORDER — BUPRENORPHINE AND NALOXONE 4; 1 MG/1; MG/1
1 FILM, SOLUBLE BUCCAL; SUBLINGUAL DAILY
Status: DISCONTINUED | OUTPATIENT
Start: 2024-12-07 | End: 2024-12-08 | Stop reason: HOSPADM

## 2024-12-07 RX ORDER — POLYETHYLENE GLYCOL 3350 17 G
2 POWDER IN PACKET (EA) ORAL
Status: DISCONTINUED | OUTPATIENT
Start: 2024-12-07 | End: 2024-12-08 | Stop reason: HOSPADM

## 2024-12-07 RX ORDER — NALOXONE HYDROCHLORIDE 0.4 MG/ML
0.2 INJECTION, SOLUTION INTRAMUSCULAR; INTRAVENOUS; SUBCUTANEOUS
Status: DISCONTINUED | OUTPATIENT
Start: 2024-12-07 | End: 2024-12-08 | Stop reason: HOSPADM

## 2024-12-07 RX ORDER — QUETIAPINE FUMARATE 50 MG/1
50 TABLET, FILM COATED ORAL AT BEDTIME
Status: DISCONTINUED | OUTPATIENT
Start: 2024-12-07 | End: 2024-12-08 | Stop reason: HOSPADM

## 2024-12-07 RX ORDER — NALOXONE HYDROCHLORIDE 0.4 MG/ML
0.4 INJECTION, SOLUTION INTRAMUSCULAR; INTRAVENOUS; SUBCUTANEOUS
Status: DISCONTINUED | OUTPATIENT
Start: 2024-12-07 | End: 2024-12-08 | Stop reason: HOSPADM

## 2024-12-07 RX ORDER — SERTRALINE HYDROCHLORIDE 100 MG/1
100 TABLET, FILM COATED ORAL DAILY
Status: DISCONTINUED | OUTPATIENT
Start: 2024-12-07 | End: 2024-12-08 | Stop reason: HOSPADM

## 2024-12-07 RX ADMIN — NICOTINE POLACRILEX 2 MG: 2 LOZENGE ORAL at 21:04

## 2024-12-07 RX ADMIN — Medication 1 TABLET: at 08:37

## 2024-12-07 RX ADMIN — TRAZODONE HYDROCHLORIDE 50 MG: 50 TABLET ORAL at 00:54

## 2024-12-07 RX ADMIN — SERTRALINE HYDROCHLORIDE 100 MG: 100 TABLET ORAL at 09:43

## 2024-12-07 RX ADMIN — FOLIC ACID 1 MG: 1 TABLET ORAL at 08:37

## 2024-12-07 RX ADMIN — GABAPENTIN 900 MG: 300 CAPSULE ORAL at 21:04

## 2024-12-07 RX ADMIN — DIAZEPAM 5 MG: 5 TABLET ORAL at 08:37

## 2024-12-07 RX ADMIN — QUETIAPINE FUMARATE 50 MG: 50 TABLET ORAL at 00:54

## 2024-12-07 RX ADMIN — THIAMINE HCL TAB 100 MG 100 MG: 100 TAB at 08:37

## 2024-12-07 RX ADMIN — DIAZEPAM 10 MG: 5 TABLET ORAL at 17:33

## 2024-12-07 RX ADMIN — DIAZEPAM 10 MG: 5 TABLET ORAL at 11:37

## 2024-12-07 RX ADMIN — ONDANSETRON 4 MG: 4 TABLET, ORALLY DISINTEGRATING ORAL at 11:25

## 2024-12-07 RX ADMIN — QUETIAPINE FUMARATE 50 MG: 50 TABLET ORAL at 21:04

## 2024-12-07 RX ADMIN — BUPRENORPHINE AND NALOXONE 1 FILM: 4; 1 FILM, SOLUBLE BUCCAL; SUBLINGUAL at 09:30

## 2024-12-07 RX ADMIN — BUPRENORPHINE AND NALOXONE 1 FILM: 8; 2 FILM BUCCAL; SUBLINGUAL at 08:37

## 2024-12-07 ASSESSMENT — ACTIVITIES OF DAILY LIVING (ADL)
ADLS_ACUITY_SCORE: 15
ADLS_ACUITY_SCORE: 15
DRESS: SCRUBS (BEHAVIORAL HEALTH)
HYGIENE/GROOMING: INDEPENDENT
ADLS_ACUITY_SCORE: 15
ORAL_HYGIENE: INDEPENDENT
ADLS_ACUITY_SCORE: 15

## 2024-12-07 NOTE — PLAN OF CARE
"  Problem: Alcohol Withdrawal  Goal: Alcohol Withdrawal Symptom Control  Outcome: Progressing   Goal Outcome Evaluation:       Hemanth is a 29 male admitted from [place] adult ED due to worsening alcohol abuse and dependence. He reported he was drinking \"everyday\" without control. Stated he stopped taking his prescribed suboxone. He is seeking for detox. Pt.is voluntary.     Pt.was brought in from the ED via a wheelchair. He appeared visibly tremulous in all extremities, diaphoretic, with sad and blunted affect. Pt.was cooperative with admission safety search and interview. Pt.denied SI/SIB. Endorsed hearing his dog barking. Endorsed mental health diagnosis of Insomnia, Anxiety, Depression, and Opiod use disorder.     Started on MSSA and COWs monitoring for alcohol and opiod management. Scored 14 on MSSA. Received 10mg prn valium.     Staff initiated 15 minutes safety rounds. Pt.went to his room sleeping immediately after admission interview. No safety or behavioral concerns observed or reported. Will continue to monitor.     At the end of evening shift, pt.was assessed and given prn subutex to manage some of the targeted withdrawal symptoms. Pt.immediately flared up, started swearing, yelling, and complaining about the dosage. He stated he took \"12mg of suboxone at home and you are giving me 2mg of subutex?\". He eventually went back to his room after getting the medication management explanations. Update and report given to the next shift RN. Will continue to monitor.                   "

## 2024-12-07 NOTE — PLAN OF CARE
RN Assessment:    Pt presented with labile affect. Pt had irritable mood. Pt appeared anxious during RN assessment. Pt was alert and oriented x 4. Pt denied having SI, HI, and thoughts of SIB. Pt endorsed having auditory hallucinations. Pt stated pt was hearing his dog barking. Pt denied having physical pain. Pt had no new medical concerns. Pt endorsed getting poor sleep last night due to waking multiple times. Pt feels no therapeutic effects from the medications that are currently ordered. No medication side effects endorsed by pt or observed by writer. Pt was isolative and withdrawn this shift. Continue to monitor fro safety and changes in medical condition.     While meeting with the psychiatric provider pt endorsed having chest pain. Internal Medicine Provider was notified. Vital signs were assessed by writer. Pt was tachycardic. Blood Pressure was WDL. Internal Medicine Provider notified of tachycardia. Internal Medicine Provider ordered labs and EKG. Pt refused to have labs drawn. Pt allowed EKG. Shortly after EKG writer checked in with pt. Pt denied having chest pain at that time. Pt met with Internal Medicine Provider. See Internal Medicine Provider's note for more information.       MSSA Scoring this shift:    0832: 8    1134: 14    1322: 7      Opiate Withdrawal Monitorin: 5    1134: 10    1322: 5      PRN Medications passed this shift:    0837: Valium 5 mg PO for MSSA score of 8.    1125: Zofran ODT 4 mg sublingually for nausea. This medication was effective per pt report.     1137: Valium 10 mg PO for MSSA score of 14.

## 2024-12-07 NOTE — PLAN OF CARE
[facility]     Case Management Encounter:   10:07 AM Attempted to check in with pt, he declined. Informed him I would come back in 60-90 minutes.  11:13 AM Attempted to check in again, he declined.    Insurance:   [employer] Choice POS II     Legal Status:   Orders Placed This Encounter      Voluntary     SUDs Assessment Status:   Unknown     ROIs on file:  Lives alone, but has been staying with family     Living Situation:   Lives alone, but has been staying with family      Current Providers, Supports & Collateral:    Psychiatric Medication Management   Emma Ugalde, APRN, APRN, CNP, APRN, CNS, RN with [facility] (Initial Assessment 5/30/24)  [address], [address] [place] [address]  Phone [phone] Fax [phone]    Suboxone prescriber (last appointment was 9/24/24)  Hiram Horner PA-C with [facility]  [address] [place] [address]  Phone: [phone]     Current Plan/Referral Status:   Unable to assess     RN updated.    Sheba Broussard, [facility], [facility] - Adult Inpatient Addiction Psychiatry Unit

## 2024-12-07 NOTE — PROGRESS NOTES
Pt refused troponin lab draw. Other labs were also ordered to be drawn at this time. Pt refused to have specimen drawn.

## 2024-12-07 NOTE — DISCHARGE INSTRUCTIONS
Behavioral Discharge Planning and Instructions  THANK YOU FOR CHOOSING [facility]  3A  [phone]    Summary: You were admitted to Station 3A on 12/06/2024 for detoxification from Alcohol Use Disorder.  A medical exam was performed that included lab work. You have met with a  and opted to declining all case management resources and services.  Please take care and make your recovery a daily priority, Hemanth!  It was a pleasure working with you and the entire treatment team here wishes you the very best in your recovery!     Recommendation:  Please seek CD treatment and or assessment as needed and required. Please follow any and all recommendations as needed and requested.         To schedule an assessment:  Call the [facility] assessment center at [phone] and ask to schedule a Chemical Health Assessment.    You can also call your [facility] Chemical Dependency unit (these are usually included under adult mental health services). Most [facility] have a number for you to call to schedule an assessment.  If you have private insurance or a PMAP you can call the customer service number on your insurance  card and they can give you a list of places to call for an assessment that are in network with your  insurance.      To find a treatment program:     SAMHSA.gov   Click on find treatment   Enter your zip code and select your city/state.  The Substance Abuse and Mental Health Services Administration (SAMHSA) is the agency within  the U.S. Department of Health and Human Services that leads public health efforts to advance the  behavioral health of the nation. SAMHSA's mission is to reduce the impact of substance abuse and  mental illness on [place] communities.    Servis1st Bankermn.org select substance use disorder programs then select find services.  Fast Tracker is a virtual community and health care connection resource. We connect  individuals, families, mental health and substance use disorder  providers, physicians, care coordinators,  and others with a real-time, searchable directory of mental health and substance use disorder resources  and their availability within [place].    Main Diagnoses:  Per Dr. Shaun Hillman MD;  303.90 (F10.20) Alcohol Use Disorder Severe    Major Treatments, Procedures and Findings:  You were treated for alcohol detoxification using Valium. You have met with a  to develop a treatment plan for discharge. You had labs drawn and those results were reviewed with you. Please take a copy of your lab work with you to your next primary care provider appointment.    Symptoms to Report:  If you experience more anxiety, confusion, sleeplessness, deep sadness or thoughts of suicide, notify your treatment team or notify your primary care provider. IF ANY OF THE SYMPTOMS YOU ARE EXPERIENCING ARE A MEDICAL EMERGENCY CALL 911 IMMEDIATELY.     Lifestyle Adjustment: Adjust your lifestyle to get enough sleep, relaxation, exercise and good nutrition. Continue to develop healthy coping skills to decrease stress and promote a sober living environment. Do not use mood altering substances including alcohol, illegal drugs or addictive medications other than what is currently prescribed.     Disposition: Detox Only Return Home    Facts about COVID19 at www.cdc.gov/COVID19 and www.[place].gov/covid19    Keeping hands clean is one of the most important steps we can take to avoid getting sick and spreading germs to others.  Please wash your hands frequently and lather with soap for at least 20 seconds!    Follow-up Appointment:   Appointment Date/Time: ***    Psychiatrist/Primary Care Giver: ***    Address: ***    Phone Number: ***      Recovery apps for your phone for educational purposes and to locate in person and zoom recovery meetings  Everything AA -  maximino is a great resource  12 Step Toolkit - educational purpose learning about the 12 steps to recovery  Rudy Cloud - meeting maximino  CLAU   - meeting maximino  Meeting guide - meeting maximino  Quick NA meeting - meeting maximino  Adiel- has various apps    Resources:  AA/NA meetings have returned to in-person or a hybrid combination of zoom/in-person therefore please check online to verify*  Need Support Now? If you or someone you know is struggling or in crisis, help is available. Call or text 624 or chat [employer].org  AA meetings search for them at: https://aa-intergroup.org (worldwide meeting listings)  AA meetings for [place] area can be found online at: https://a[place].org (click local online meetings listings)  NA meetings for [place] area can be found online at: https://www.nam[place].org  (click find a meeting)  AA and NA Sponsors are excellent resources for support and you can find one at any support group meeting.   Alcoholics Anonymous (https://aa.org/): for information 24 hours/day  AA Intergroup service office in [place] (http://www.aastpaul.org/) 419.628.6966  AA Intergroup service office in [facility]: [phone]. (http://www.Picklive.org/)  Narcotics Anonymous (www.nam[place].org) (638) 705-9975  https://aafairviewriverside.org/meetings  SMART Recovery - self management for addiction recovery:  www.smartrecovery.org  Pathways ~ A Health Crisis Resource & Support Center:  591.671.4044.  https://prescribetoprevent.org/patient-education/videos/  http://www.harmreduction.org  Crisis Text Line  Text 591568  You will be connected with a trained live crisis counselor to provide support. Por espanol, texto  PATEL a 808792 o texto a 442-AYUDAME en [facility] Hope Line  1.800.SUICIDE [5114552]  [facility] 868-097-6226  Support Group:  AA/NA and Sponsor/support.  Fast Tracker  Linking people to mental health and substance use disorder resources  SafetyWebn.org   [place] Mental [facility] Warm Line  Peer to peer support  Monday thru Saturday, 12 pm to 10 pm  541.892.2362 or 2.093.693.4023  Text  "\"Support\" to 69080  National Myrtle on Mental Illness (MYRTLE)  154.549.6949 or 1.888.MYRTLE.HELPS  Alcoholics Anonymous (www.alcoholics-anonymous.org): Check your phone book for your local chapter.  Suicide Awareness Voices of Education (SAVE) (www.save.org): 698-679-GDEC (7283)  National Suicide Prevention Line (www.mentalhealthmn.org): 999-861-XXFG (4159)  Mental Health Consumer/Survivor Network of [place] (www.mhcsn.net): 634.845.2705 or 317-611-1673  Mental Health Association of [place] (www.mentalhealth.org): 192.103.3478 or 689-695-6585   Substance Abuse and Mental Health Services (www.samhsa.gov)  [place] Opioid Prevention Coalition: www.opioidcoalition.org    [place] Recovery Connection ([facility])  [facility] connects people seeking recovery to resources that help foster and sustain long-term recovery.  Whether you are seeking resources for treatment, transportation, housing, job training, education, health care or other pathways to recovery, [facility] is a great place to start.  762.749.2752.  www.minnesotarecInMobiy.org    Great Pod casts for nutrition and wellness  Listen on Apple Podcasts  [employer] [facility]   Nutrition       Understand the connection between what you eat and how you feel. Hosted by licensed nutritionists and dietitians from [employer] we share practical, real-life solutions for healthier living through nutrition.     General Medication Instructions:   See your medication sheet(s) for instructions.   Take all medications as prescribed.  Make no changes unless your primary care provider suggests them.   Go to all your primary care provider visits.  Be sure to have all your required lab tests. This way, your medicines can be refilled on time.  Do not use any forms of alcohol.    Please Note: If you have any questions at anytime after you discharged please call [facility] detox unit 3A at [phone].    Here are a list of additional numbers you can " call if you are wanting to resume services through [facility]:  [facility] Assessment Intake: [phone]  [facility] Adult [facility] Intensive Outpatient  call: [phone]  [facility] Admissions [phone]    Recovery Clinic call: [phone]  [facility]: [phone]  Medical Records call: [phone]  Billing Department call: [phone]    Please remember to take all of your behavioral discharge planning and lab paperwork to any follow up appointments, it contains your lab results, diagnosis, medication list and discharge recommendations.      THANK YOU FOR CHOOSING [facility]

## 2024-12-07 NOTE — PLAN OF CARE
Behavioral Team Discussion: (12/7/2024)    Continued Stay Criteria/Rationale: Patient admitted for  Alcohok Use Disorder.  Plan: The following services will be provided to the patient; psychiatric assessment, medication management, therapeutic milieu, individual and group support, and skills groups.   Participants: 3A Provider: Dr. Shaun Hillman MD; 3A RN: Sual Gamboa RN; 3A CM's: Justina Parra.  Summary/Recommendation: Providers will assess today for treatment recommendations, discharge planning, and aftercare plans. CM will meet with pt for discharge planning.   Medical/Physical: Patient denies any medical or physical concerns at this time.  Precautions:   Behavioral Orders   Procedures    Code 1 - Restrict to Unit    Discontinue 1:1 attendant for suicide risk     Order Specific Question:   I have performed an in person assessment of the patient     Answer:   Based on this assessment the patient no longer requires a one on one attendant at this point in time.     Order Specific Question:   Rationale     Answer:   Patient States able to remain safe in hospital     Order Specific Question:   Rationale     Answer:   Modifications to care environment made to mitigate safety risk     Order Specific Question:   Rationale     Answer:   Routine observations are sufficient to monitor safety.    Routine Programming     As clinically indicated    Seizure precautions    Status 15     Every 15 minutes.    Withdrawal precautions     Rationale for change in precautions or plan: N/A  Progress: Initial.    ASAM Dimension Scale Ratings:  Dimension 1: 3 Client tolerates and melyssa with withdrawal discomfort poorly. Client has severe intoxication, such that the client endangers self or others, or intoxication has not abated with less intensive levels of services. Client displays severe signs and symptoms; or risk of severe, but manageable withdrawal; or withdrawal worsening despite detox at less intensive level.  Dimension 2: 1  Client tolerates and melyssa with physical discomfort and is able to get the services that the client needs.  Dimension 3: 2 Client has difficulty with impulse control and lacks coping skills. Client has thoughts of suicide or harm to others without means; however, the thoughts may interfere with participation in some treatment activities. Client has difficulty functioning in significant life areas. Client has moderate symptoms of emotional, behavioral, or cognitive problems. Client is able to participate in most treatment activities.  Dimension 4: 3 Client displays inconsistent compliance, minimal awareness of either the client's addiction or mental disorder, and is minimally cooperative.  Dimension 5: 3 Client has poor recognition and understanding of relapse and recidivism issues and displays moderately high vulnerability for further substance use or mental health problems. Client has few coping skills and rarely applies coping skills.  Dimension 6: 3 Client is not engaged in structured, meaningful activity and the client's peers, family, significant other, and living environment are unsupportive, or there is significant criminal justice system involvement.

## 2024-12-07 NOTE — H&P
Reason for admission:     Patient was admitted due to alcohol dependence      HPI:   29 year old male    Patient states that he has had a drinking problem since age 17. He has had 2 prior treatments including 2 years ago and 1 year ago. His longest sobriety is 40-50 days. His most recent treatment was one year ago at [facility].     Over the past several months he has been using 1.5 liters of vodka per day.    Patient has never had DUI. He states that he might have had a seizure in the past but is unsure.  He has had blackouts.    Patient is treated for Zoloft for depression which he has been using for past 2 years and he does find it to be helpful. He denies depression currently and denies suicidal or homicidal thoughts. He denies history of mae or hypomania.    Patient has a history of opiate dependence, but he was able to stop them one year ago. He is Suboxone maintenance.    According to ER report:  Hemanth Hurd is a 29 year old male with history of alcohol use disorder, opiate use disorder (was on Suboxone strips x 12-3 mg twice daily, last prescription given in September 2024) who presents to the emergency department with suicidal ideation in setting of alcohol use disorder.  He reports drinking 1.75 L of liquor daily.  He last drank about 2 hours prior to arrival.  He notes prior history of alcohol withdrawal seizures.  Patient has been off Suboxone x 3 days. He is having dry heaving, nausea.   Tian Esquivel MD   [facility] EMERGENCY DEPARTMENT  12/5/2024            Past Psychiatric History:     As above        Substance Use and History:     As above. No recent drug use        Past Medical History:   PAST MEDICAL HISTORY:   Past Medical History:   Diagnosis Date    History of alcohol abuse     History of benzodiazepine use     History of opiate therapy        PAST SURGICAL HISTORY: No past surgical history on file.          Family History:   FAMILY HISTORY:   Family History    Problem Relation Age of Onset    Rheumatoid Arthritis Mother     Mental Illness Father            Social History:   Please see the full psychosocial profile from the clinical treatment coordinator.   SOCIAL HISTORY:   Social History     Tobacco Use    Smoking status: Former     Types: Cigarettes    Smokeless tobacco: Former     Types: Chew     Quit date: 12/2021    Tobacco comments:     Only when unable to vape   Substance Use Topics    Alcohol use: Not Currently     Comment: hx of abuse     Patient lives alone, but has been staying with family. He works in sales. He denies current romantic involvement. He has an 18 month old child and cares for her 3 days a week.          PTA Medications:     Medications Prior to Admission   Medication Sig Dispense Refill Last Dose/Taking    Buprenorphine HCl-Naloxone HCl (SUBOXONE) 12-3 MG FILM per film Place 1 Film under the tongue 2 times daily. 60 Film 1 12/2/2024    cloNIDine (CATAPRES) 0.1 MG tablet Take 1-2 tablets (0.1-0.2 mg) by mouth 2 times daily as needed (withdrawal symptoms, anxiety). 120 tablet 3 12/4/2024    gabapentin (NEURONTIN) 300 MG capsule Take 3-4 capsules (900-1,200 mg) by mouth at bedtime. 270 capsule 3 Past Week    modafinil (PROVIGIL) 100 MG tablet Take 100 mg by mouth daily as needed (narcolepsy).   More than a month    naloxone (NARCAN) 4 MG/0.1ML nasal spray 4 mg once as needed for opioid reversal.   Taking As Needed    QUEtiapine (SEROQUEL) 50 MG tablet Take 1 tablet (50 mg) by mouth 2 times daily. 90 tablet 1 Past Week    sertraline (ZOLOFT) 100 MG tablet Take 1 tablet (100 mg) by mouth daily 90 tablet 3 Past Week            Current Medications:     Current Facility-Administered Medications   Medication Dose Route Frequency Provider Last Rate Last Admin    buprenorphine HCl-naloxone HCl (SUBOXONE) 8-2 MG per film 1 Film  1 Film Sublingual Daily Tian Esquivel MD   1 Film at 12/06/24 0373    folic acid (FOLVITE) tablet 1 mg  1 mg Oral  Daily Tian Esquivel MD   1 mg at 12/06/24 0758    gabapentin (NEURONTIN) capsule 900-1,200 mg  900-1,200 mg Oral At Bedtime Breana Aviles MD   900 mg at 12/06/24 2211    multivitamin w/minerals (THERA-VIT-M) tablet 1 tablet  1 tablet Oral Daily Tian Esquivel MD   1 tablet at 12/06/24 0758    QUEtiapine (SEROquel) tablet 50 mg  50 mg Oral At Bedtime Levon Vu DO   50 mg at 12/07/24 0054    thiamine (B-1) tablet 100 mg  100 mg Oral Daily Tian Esquivel MD   100 mg at 12/06/24 0758     Current Facility-Administered Medications   Medication Dose Route Frequency Provider Last Rate Last Admin    acetaminophen (TYLENOL) tablet 650 mg  650 mg Oral Q4H PRN Deborah Mcgrath, YANICK REGAN        alum & mag hydroxide-simethicone (MAALOX) suspension 30 mL  30 mL Oral Q4H PRN Deborah Mcgrath APRN CNP        buprenorphine (SUBUTEX) sublingual tablet 2 mg  2 mg Sublingual Q2H PRN Deborah cMgrath APRN CNP   2 mg at 12/06/24 2312    cloNIDine (CATAPRES) tablet 0.1-0.2 mg  0.1-0.2 mg Oral BID PRN Breana Aviles MD   0.2 mg at 12/06/24 1807    diazepam (VALIUM) tablet 5-20 mg  5-20 mg Oral Q30 Min PRN Tian Esquivel MD   10 mg at 12/06/24 2047    hydrOXYzine HCl (ATARAX) tablet 25 mg  25 mg Oral Q4H PRN Deborah Mcgrath, APRYELENA REGAN        loperamide (IMODIUM) capsule 2 mg  2 mg Oral Q4H PRN Deborah Mcgrath APRN CNP        nicotine polacrilex (NICORETTE) gum 4 mg  4 mg Buccal Q1H PRN Tian Esquivel MD   4 mg at 12/06/24 2333    ondansetron (ZOFRAN ODT) ODT tab 4 mg  4 mg Oral Q6H PRN Deborah Mcgrath APRYELENA CNP   4 mg at 12/06/24 1807    senna-docusate (SENOKOT-S/PERICOLACE) 8.6-50 MG per tablet 1 tablet  1 tablet Oral BID PRN Deborah Mcgrath APRN CNP        traZODone (DESYREL) tablet 50 mg  50 mg Oral At Bedtime PRN Deborah Mcgrath APRN CNP   50 mg at 12/07/24 0054            Allergies:   No Known Allergies       Labs:     Recent Results (from the  past 72 hours)   Alcohol breath test POCT    Collection Time: 12/05/24  1:42 PM   Result Value Ref Range    Alcohol Breath Test 0.26 (A) 0.00 - 0.01   Extra Green Top (Lithium Heparin) Tube    Collection Time: 12/05/24  1:45 PM   Result Value Ref Range    Hold Specimen jic    Extra Purple Top Tube    Collection Time: 12/05/24  1:45 PM   Result Value Ref Range    Hold Specimen JIC    Magnesium    Collection Time: 12/05/24  1:45 PM   Result Value Ref Range    Magnesium 2.1 1.7 - 2.3 mg/dL   Ethyl Alcohol Level    Collection Time: 12/05/24  1:45 PM   Result Value Ref Range    Alcohol ethyl 0.39 (HH) <=0.01 g/dL   CBC with platelets and differential    Collection Time: 12/05/24  1:45 PM   Result Value Ref Range    WBC Count 12.6 (H) 4.0 - 11.0 10e3/uL    RBC Count 5.28 4.40 - 5.90 10e6/uL    Hemoglobin 15.5 13.3 - 17.7 g/dL    Hematocrit 44.5 40.0 - 53.0 %    MCV 84 78 - 100 fL    MCH 29.4 26.5 - 33.0 pg    MCHC 34.8 31.5 - 36.5 g/dL    RDW 14.9 10.0 - 15.0 %    Platelet Count 206 150 - 450 10e3/uL    % Neutrophils 84 %    % Lymphocytes 11 %    % Monocytes 5 %    % Eosinophils 0 %    % Basophils 0 %    % Immature Granulocytes 0 %    NRBCs per 100 WBC 0 <1 /100    Absolute Neutrophils 10.5 (H) 1.6 - 8.3 10e3/uL    Absolute Lymphocytes 1.4 0.8 - 5.3 10e3/uL    Absolute Monocytes 0.6 0.0 - 1.3 10e3/uL    Absolute Eosinophils 0.0 0.0 - 0.7 10e3/uL    Absolute Basophils 0.0 0.0 - 0.2 10e3/uL    Absolute Immature Granulocytes 0.1 <=0.4 10e3/uL    Absolute NRBCs 0.0 10e3/uL   Hemoglobin A1c    Collection Time: 12/05/24  1:45 PM   Result Value Ref Range    Estimated Average Glucose 114 <117 mg/dL    Hemoglobin A1C 5.6 <5.7 %   Comprehensive metabolic panel    Collection Time: 12/05/24  2:50 PM   Result Value Ref Range    Sodium 137 135 - 145 mmol/L    Potassium 4.4 3.4 - 5.3 mmol/L    Carbon Dioxide (CO2) 12 (L) 22 - 29 mmol/L    Anion Gap 29 (H) 7 - 15 mmol/L    Urea Nitrogen 16.9 6.0 - 20.0 mg/dL    Creatinine 1.02 0.67 -  "1.17 mg/dL    GFR Estimate >90 >60 mL/min/1.73m2    Calcium 8.8 8.8 - 10.4 mg/dL    Chloride 96 (L) 98 - 107 mmol/L    Glucose 149 (H) 70 - 99 mg/dL    Alkaline Phosphatase 166 (H) 40 - 150 U/L     (H) 0 - 45 U/L    ALT 45 0 - 70 U/L    Protein Total 8.4 (H) 6.4 - 8.3 g/dL    Albumin 4.9 3.5 - 5.2 g/dL    Bilirubin Total 0.4 <=1.2 mg/dL   Lipase    Collection Time: 12/05/24  2:50 PM   Result Value Ref Range    Lipase 56 13 - 60 U/L   TSH with free T4 reflex    Collection Time: 12/05/24  2:50 PM   Result Value Ref Range    TSH 0.60 0.30 - 4.20 uIU/mL   GGT    Collection Time: 12/05/24  2:50 PM   Result Value Ref Range     (H) 8 - 61 U/L   Urine Drug Screen Panel    Collection Time: 12/05/24  5:36 PM   Result Value Ref Range    Amphetamines Urine Screen Negative Screen Negative    Barbituates Urine Screen Negative Screen Negative    Benzodiazepine Urine Screen Positive (A) Screen Negative    Cannabinoids Urine Screen Negative Screen Negative    Cocaine Urine Screen Negative Screen Negative    Fentanyl Qual Urine Screen Negative Screen Negative    Opiates Urine Screen Negative Screen Negative    PCP Urine Screen Negative Screen Negative          Physical Exam:     BP (!) 143/93 (BP Location: Right arm, Patient Position: Sitting, Cuff Size: Adult Regular)   Pulse 104   Temp 97.4  F (36.3  C) (Oral)   Resp 18   Ht 1.854 m (6' 1\")   Wt 97.1 kg (214 lb)   SpO2 98%   BMI 28.23 kg/m    Weight is 214 lbs 0 oz  Body mass index is 28.23 kg/m .    Physical Exam:  Gen: No acute distress  Skin: No diaphoresis or rash  Neuro: No abnormal movements         Physical ROS:   The patient endorsed some stomach pain and chest pain. The remainder of 10-point review of systems was negative except as noted in HPI.             Mental Status Exam:     Mental Status  Patient is casually dressed  Hygiene good  Speech fluent  Thought Process logical  Thought Content:  No suicidal ideation,    No homicidal ideation,   No ideas " of reference,    No loose associations,    No auditory hallucinations,     No visual hallucinations   No delusions  Psychomotor: No agitation or slowing  Cognition:  Alert and oriented to time place and person  Attention good  Concentration good  Memory normal including recent and remote memory  Mood:  euthymic  Affect: mood congruent  Judgement: normal  Eye contact good  Cooperation good  Language normal  Fund of knowledge normal  Musculoskeletal normal gait with no abnormal movements         Diagnoses:   Alcohol dependence with intoxication with complication (H)    Patient Active Problem List   Diagnosis    History of alcohol abuse    Opioid use disorder, severe, dependence (H)    EMILY (generalized anxiety disorder)    Current moderate episode of major depressive disorder, unspecified whether recurrent (H)    Drug-induced insomnia (H)    Opioid dependence with withdrawal (H)    Alcohol dependence with intoxication with complication (H)    Depression, unspecified depression type              Assessment:     Patient presents for alcohol dependence           Plan:     Medication:  Continue Zoloft, Suboxone and Seroquel  Continue Valium detox    Consults: Hospitalist will be consulted for medical issues    Will check EKG considering chest pain      Multidisciplinary Interventions:  to gather collateral information, coordinate care with outpatient providers and begin follow up planning      Disposition: Assess options for CD treatment         More than 40 minutes spent on this visit with more than 50% time spent on coordination of care with staff, reviewing medical record, psychoeducation, providing supportive therapy regarding coping with chronic mental illness, entering orders and preparing documentation for the visit    Shaun Hillman MD

## 2024-12-07 NOTE — PLAN OF CARE
"  Problem: Adult Behavioral Health Plan of Care  Goal: Adheres to Safety Considerations for Self and Others  Outcome: Progressing     Problem: Alcohol Withdrawal  Goal: Alcohol Withdrawal Symptom Control  Outcome: Progressing     Problem: Sleep Disturbance  Goal: Adequate Sleep/Rest  Outcome: Progressing   Goal Outcome Evaluation:  Patient is alert, and oriented x4 and able to express his needs. Patient was visibly agitated, and angry because he could not have the medication he regularly takes at home. Patient stated that \"I am withdrawing because I am not getting the medication I usually get at home. Patient had  MSSA score of 12 at 2039 and was given 10 mg of varium. Patient's MSSA was 10. Patient denied pain, rated his depression at 10, and anxiety at 10, denied SI/SIB,and committed to safety. Patient requested for Seroquel, on-Call MD paged and put in orders for Seroquel. Patient was given 50 mg of Seroquel, and 50 mg of trazodone. Patient became calm, and cooperative, and went to sleep.   Patient had 6.25 hours of sleep this shift. No other concerns at this time, team will continue to monitor for needs.    "

## 2024-12-07 NOTE — CONSULTS
"[facility]  Consult Note - Hospitalist Service  Date of Admission:  12/5/2024  Consult Requested by: Psychiatry   Reason for Consult: \"detox, hx of withdrawal seizures\"    Assessment & Plan   Hemanth Hurd is a 29 year old male with a PMH alcohol use disorder, opiate use disorder admitted on 12/5/2024 to inpatient detox for acute alcohol withdrawal. Medicine was consulted for medical co-management.     Acute alcohol withdrawal   Polysubstance use disorder   History or withdrawal seizures   Presented with blood alcohol 0.39. Typically drinks 1.75mL of liquor daily. Last drink 2h prior to arrival to ED. History of alcohol withdrawal seizures. Last withdrawal seizure was ~ 1 year ago. Has been off suboxone for 3 days.   -Management per Psych   -MSSA or CIWA per Psychiatry with valium   -Thiamine folate, MVI  -Suboxone per Psychiatry  -Seizure precautions      Leukocytosis  No fever or other infectious sxs. Neutrophils elevate to 10.5. Possibly in the setting of acute withdrawal.   -CBC in AM, although patient declining lab  -Monitor for infectious sxs     AGMA  Likely in the setting of acute withdrawal and poor PO intake. Patient declined any labs at this time. Encourage PO intake. BMP ordered   -BMP ordered, but patient declining     Elevated transaminases   . ALT 45. Alk phos 166. T bili wnl. No abdominal pain, nausea, vomiting   -LFTs in AM  -Please reach out to medicine team for development of nausea, vomiting or abdominal pain     EMILY  MDD: management per Psychiatry     Resolved:   Chest pain, resolved  Noted AM 12/07 with increased anxiety. No dyspnea of palpitations and resolved at the time I met with patient. No burning sensation or trauma to chest. ECG without concerning findings and patient is declining troponin.   -Monitor for return of symptoms.      The patient's care was discussed with the Bedside Nurse, Patient, and Primary team via this " "note.    Medicine will follow up AM labs.     Clinically Significant Risk Factors Present on Admission          # Hypochloremia: Lowest Cl = 96 mmol/L in last 2 days, will monitor as appropriate     # Anion Gap Metabolic Acidosis: Highest Anion Gap = 29 mmol/L in last 2 days, will monitor and treat as appropriate      # Hypertension: Home medication list includes antihypertensive(s)           # Overweight: Estimated body mass index is 28.23 kg/m  as calculated from the following:    Height as of this encounter: 1.854 m (6' 1\").    Weight as of this encounter: 97.1 kg (214 lb).              Hailey Gonzales PA-C  Hospitalist Service  Securely message with [employer] (more info)  Text page via [facility] Paging/Directory   ______________________________________________________________________    Chief Complaint   Withdrawal     History is obtained from the patient    History of Present Illness   Hemanth Hurd is a 29 year old male with a [facility] alcohol use disorder, opiate use disorder admitted on 12/5/2024 to inpatient detox for acute alcohol withdrawal. Medicine was consulted for medical co-management.     Withdrawal sxs include anxiety, loose stools, hallucinations, tremor, nausea, dry heaves. No vomiting though as he hasn't eaten much. No longer having chest pain. Came on with anxiety. Felt like \"a littler person is standing on me\". No dyspnea. Dizziness or lightheadedness. No longer experiencing. Some hallucinations. No chest pain, dyspnea, cough, abdominal pain, urinary sxs. Doesn't know if he has swelling in bilateral lower extremities. Having headache and sensitivity to light. Asks about phenobarbital.       Past Medical History    Past Medical History:   Diagnosis Date    History of alcohol abuse     History of benzodiazepine use     History of opiate therapy        Past Surgical History   No past surgical history on file.    Medications   I have reviewed this patient's current medications       Review of Systems  "   The 10 point Review of Systems is negative other than noted in the HPI or here.     Social History   I have reviewed this patient's social history and updated it with pertinent information if needed.  Social History     Tobacco Use    Smoking status: Former     Types: Cigarettes    Smokeless tobacco: Former     Types: Chew     Quit date: 12/2021    Tobacco comments:     Only when unable to vape   Vaping Use    Vaping status: Every Day    Substances: Nicotine, Flavoring    Devices: Refillable tank   Substance Use Topics    Alcohol use: Not Currently     Comment: hx of abuse    Drug use: Not Currently     Types: Benzodiazepines, Opiates         Family History   I have reviewed this patient's family history and updated it with pertinent information if needed.  Family History   Problem Relation Age of Onset    Rheumatoid Arthritis Mother     Mental Illness Father          Allergies   No Known Allergies     Physical Exam   Vital Signs: Temp: 97.6  F (36.4  C) Temp src: Temporal BP: (!) 141/96 Pulse: 62   Resp: 16 SpO2: 100 % O2 Device: None (Room air)    Weight: 214 lbs 0 oz  General: laying in bed, alert, cooperative, awake, in no acute distress  HEENT: normocephalic, atraumatic, anicteric sclera, moist mucus membranes, no lymphadenopathy appreciated, PERRLA, EOM wnl  Respiratory: breathing comfortably on room air, clear to auscultation bilaterally without wheezing, crackles, or rhonchi appreciated  Cardiac: regular rate and rhythm with normal S1/S2 without murmurs, clicks, rubs or gallops, 2+ radial pulse on LUE, no signs of peripheral edema bilaterally  GI: soft, non-distended, normoactive bowel sounds, non-tender per palpation  Neuro: grossly non-focal, alert and oriented, normal speech, tremulous   MSK: no bony deformities, moving all extremities independently  Skin: no rashes or lesions on uncovered surfaces, no jaundice      Medical Decision Making       60 MINUTES SPENT BY ME on the date of service doing chart  review, history, exam, documentation & further activities per the note.      Data   NOTE: Data reviewed over the past 24 hrs contributes toward MDM complexity

## 2024-12-08 VITALS
HEIGHT: 73 IN | RESPIRATION RATE: 14 BRPM | DIASTOLIC BLOOD PRESSURE: 99 MMHG | WEIGHT: 214 LBS | OXYGEN SATURATION: 99 % | HEART RATE: 93 BPM | SYSTOLIC BLOOD PRESSURE: 136 MMHG | BODY MASS INDEX: 28.36 KG/M2 | TEMPERATURE: 97 F

## 2024-12-08 LAB
ALBUMIN SERPL BCG-MCNC: 4.2 G/DL (ref 3.5–5.2)
ALP SERPL-CCNC: 133 U/L (ref 40–150)
ALT SERPL W P-5'-P-CCNC: 38 U/L (ref 0–70)
ANION GAP SERPL CALCULATED.3IONS-SCNC: 13 MMOL/L (ref 7–15)
AST SERPL W P-5'-P-CCNC: 59 U/L (ref 0–45)
BILIRUB DIRECT SERPL-MCNC: <0.2 MG/DL (ref 0–0.3)
BILIRUB SERPL-MCNC: 0.4 MG/DL
BUN SERPL-MCNC: 18.7 MG/DL (ref 6–20)
CALCIUM SERPL-MCNC: 9.7 MG/DL (ref 8.8–10.4)
CHLORIDE SERPL-SCNC: 98 MMOL/L (ref 98–107)
CREAT SERPL-MCNC: 0.75 MG/DL (ref 0.67–1.17)
EGFRCR SERPLBLD CKD-EPI 2021: >90 ML/MIN/1.73M2
ERYTHROCYTE [DISTWIDTH] IN BLOOD BY AUTOMATED COUNT: 14 % (ref 10–15)
GLUCOSE SERPL-MCNC: 88 MG/DL (ref 70–99)
HCO3 SERPL-SCNC: 25 MMOL/L (ref 22–29)
HCT VFR BLD AUTO: 38.5 % (ref 40–53)
HGB BLD-MCNC: 13.6 G/DL (ref 13.3–17.7)
MCH RBC QN AUTO: 29.6 PG (ref 26.5–33)
MCHC RBC AUTO-ENTMCNC: 35.3 G/DL (ref 31.5–36.5)
MCV RBC AUTO: 84 FL (ref 78–100)
PLATELET # BLD AUTO: 117 10E3/UL (ref 150–450)
POTASSIUM SERPL-SCNC: 3.6 MMOL/L (ref 3.4–5.3)
PROT SERPL-MCNC: 6.9 G/DL (ref 6.4–8.3)
RBC # BLD AUTO: 4.6 10E6/UL (ref 4.4–5.9)
SODIUM SERPL-SCNC: 136 MMOL/L (ref 135–145)
TROPONIN T SERPL HS-MCNC: <6 NG/L
WBC # BLD AUTO: 6 10E3/UL (ref 4–11)

## 2024-12-08 PROCEDURE — 99238 HOSP IP/OBS DSCHRG MGMT 30/<: CPT | Performed by: PSYCHIATRY & NEUROLOGY

## 2024-12-08 PROCEDURE — 84484 ASSAY OF TROPONIN QUANT: CPT | Performed by: PSYCHIATRY & NEUROLOGY

## 2024-12-08 PROCEDURE — 250N000013 HC RX MED GY IP 250 OP 250 PS 637: Performed by: PSYCHIATRY & NEUROLOGY

## 2024-12-08 PROCEDURE — 80048 BASIC METABOLIC PNL TOTAL CA: CPT | Performed by: PSYCHIATRY & NEUROLOGY

## 2024-12-08 PROCEDURE — 36415 COLL VENOUS BLD VENIPUNCTURE: CPT | Performed by: PSYCHIATRY & NEUROLOGY

## 2024-12-08 PROCEDURE — 99207 PR NO BILLABLE SERVICE THIS VISIT: CPT | Performed by: PHYSICIAN ASSISTANT

## 2024-12-08 PROCEDURE — 250N000012 HC RX MED GY IP 250 OP 636 PS 637: Performed by: PSYCHIATRY & NEUROLOGY

## 2024-12-08 PROCEDURE — 82248 BILIRUBIN DIRECT: CPT | Performed by: PSYCHIATRY & NEUROLOGY

## 2024-12-08 PROCEDURE — 85027 COMPLETE CBC AUTOMATED: CPT | Performed by: PSYCHIATRY & NEUROLOGY

## 2024-12-08 PROCEDURE — 85014 HEMATOCRIT: CPT | Performed by: PSYCHIATRY & NEUROLOGY

## 2024-12-08 RX ADMIN — FOLIC ACID 1 MG: 1 TABLET ORAL at 08:23

## 2024-12-08 RX ADMIN — BUPRENORPHINE AND NALOXONE 1 FILM: 8; 2 FILM BUCCAL; SUBLINGUAL at 08:24

## 2024-12-08 RX ADMIN — Medication 1 TABLET: at 08:23

## 2024-12-08 RX ADMIN — SERTRALINE HYDROCHLORIDE 100 MG: 100 TABLET ORAL at 08:23

## 2024-12-08 RX ADMIN — BUPRENORPHINE AND NALOXONE 1 FILM: 4; 1 FILM, SOLUBLE BUCCAL; SUBLINGUAL at 08:24

## 2024-12-08 RX ADMIN — THIAMINE HCL TAB 100 MG 100 MG: 100 TAB at 08:23

## 2024-12-08 ASSESSMENT — ACTIVITIES OF DAILY LIVING (ADL)
ADLS_ACUITY_SCORE: 15
DRESS: INDEPENDENT
HYGIENE/GROOMING: INDEPENDENT
ADLS_ACUITY_SCORE: 15
ORAL_HYGIENE: INDEPENDENT
ADLS_ACUITY_SCORE: 15

## 2024-12-08 NOTE — PLAN OF CARE
RN Assessment:    Pt presented with euthymic affect. Pt appeared calm, and pt was cooperative while interacting with writer. Pt was alert and oriented x 4. Pt denied having SI, HI, thoughts of SIB, and hallucinations. Pt denied having physical pain. Pt had no new medical concerns. Pt endorsed sleeping well last night. Pt feels the medications that are currently ordered are working well. No medication side effects endorsed by pt or observed by writer. Pt was isolative and withdrawn this shift. Continue to monitor for safety and changes in medical condition.     MSSA Scores this shift:    0820: 4  Pt has had two consecutive MSSA scores under eight, next MSSA due at 1620.     Opiate Withdrawal Scorin: 3    1151: 3

## 2024-12-08 NOTE — PROGRESS NOTES
Brief Hospital Medicine Note:     Hemanth Hurd is a 29 year old male with a PMH alcohol use disorder, opiate use disorder admitted on 12/5/2024 to inpatient detox for acute alcohol withdrawal. Medicine was consulted for medical co-management.     Medicine team following up on LFTs and repeat CBC. Nursing notes, vitals, and labs reviewed.    CBC RESULTS:   Recent Labs   Lab Test 12/08/24  0725   WBC 6.0   RBC 4.60   HGB 13.6   HCT 38.5*   MCV 84   MCH 29.6   MCHC 35.3   RDW 14.0   *       Liver Function Studies -   Recent Labs   Lab Test 12/08/24  0725   PROTTOTAL 6.9   ALBUMIN 4.2   BILITOTAL 0.4   ALKPHOS 133   AST 59*   ALT 38     LFTs with improvement. No abdominal pain, nausea vomiting per discussion with RN and plan for discharge today. Recommend continued follow up with PCP as OP to continue to trend LFTs.     Leukocytosis resolved. Possible hemoconcentration on admission in the setting of poor PO intake and ETOH use. Recheck showed this had resolved.     Thrombocytopenia noted new on CBC on recheck. Noted previous. No concerns for bleeding. As patient is discharging today, recommend follow up with PCP as OP to continue to trend.     Medicine will sign off at this time. Thank you for the consult. If further questions arise, please reach out to Medicine Team.     Hailey Marie PA-C  Hospitalist Service  Contact information available via [facility] Paging/Directory

## 2024-12-08 NOTE — DISCHARGE SUMMARY
"Patient discharged home today at 5:36 PM.Patient seen off by staff, patient picked up by mom.   Belonging that was left in the ED on admission day collected by 3 A staff and given to patient. Belonging brought in to the unit on admission also given back to patient.   After visit summary copy given to patient.  No medication from pharmacy.  Patient has no further questions at this time, reporting, \"I just wanna get out of here.\"  "

## 2024-12-08 NOTE — DISCHARGE SUMMARY
Reason for admission:     Patient was admitted due to alcohol dependence        Hospital Course:     Patient was admitted and underwent detox with Valium protocols. He was continued on his home dose of Suboxone. He was continued on Zoloft.    He tolerated detox. He was evaluated by hospitalist due to chest pain which was felt to be non-cardiac.     On day of discharge patient was denying depression and was having no suicidal or homicidal thoughts.    He plans to pursue outpatient CD treatment in the community.        HPI:   29 year old male    Patient states that he has had a drinking problem since age 17. He has had 2 prior treatments including 2 years ago and 1 year ago. His longest sobriety is 40-50 days. His most recent treatment was one year ago at [facility].     Over the past several months he has been using 1.5 liters of vodka per day.    Patient has never had DUI. He states that he might have had a seizure in the past but is unsure.  He has had blackouts.    Patient is treated for Zoloft for depression which he has been using for past 2 years and he does find it to be helpful. He denies depression currently and denies suicidal or homicidal thoughts. He denies history of mae or hypomania.    Patient has a history of opiate dependence, but he was able to stop them one year ago. He is Suboxone maintenance.    According to ER report:  Hemanth Hurd is a 29 year old male with history of alcohol use disorder, opiate use disorder (was on Suboxone strips x 12-3 mg twice daily, last prescription given in September 2024) who presents to the emergency department with suicidal ideation in setting of alcohol use disorder.  He reports drinking 1.75 L of liquor daily.  He last drank about 2 hours prior to arrival.  He notes prior history of alcohol withdrawal seizures.  Patient has been off Suboxone x 3 days. He is having dry heaving, nausea.   Tian Esquivel MD   [facility] EMERGENCY  DEPARTMENT  12/5/2024            Past Psychiatric History:     As above        Substance Use and History:     As above. No recent drug use        Past Medical History:   PAST MEDICAL HISTORY:   Past Medical History:   Diagnosis Date    History of alcohol abuse     History of benzodiazepine use     History of opiate therapy        PAST SURGICAL HISTORY: No past surgical history on file.          Family History:   FAMILY HISTORY:   Family History   Problem Relation Age of Onset    Rheumatoid Arthritis Mother     Mental Illness Father            Social History:   Please see the full psychosocial profile from the clinical treatment coordinator.   SOCIAL HISTORY:   Social History     Tobacco Use    Smoking status: Former     Types: Cigarettes    Smokeless tobacco: Former     Types: Chew     Quit date: 12/2021    Tobacco comments:     Only when unable to vape   Substance Use Topics    Alcohol use: Not Currently     Comment: hx of abuse     Patient lives alone, but has been staying with family. He works in sales. He denies current romantic involvement. He has an 18 month old child and cares for her 3 days a week.          PTA Medications:     Medications Prior to Admission   Medication Sig Dispense Refill Last Dose/Taking    Buprenorphine HCl-Naloxone HCl (SUBOXONE) 12-3 MG FILM per film Place 1 Film under the tongue 2 times daily. 60 Film 1 12/2/2024    cloNIDine (CATAPRES) 0.1 MG tablet Take 1-2 tablets (0.1-0.2 mg) by mouth 2 times daily as needed (withdrawal symptoms, anxiety). 120 tablet 3 12/4/2024    gabapentin (NEURONTIN) 300 MG capsule Take 3-4 capsules (900-1,200 mg) by mouth at bedtime. 270 capsule 3 Past Week    modafinil (PROVIGIL) 100 MG tablet Take 100 mg by mouth daily as needed (narcolepsy).   More than a month    naloxone (NARCAN) 4 MG/0.1ML nasal spray 4 mg once as needed for opioid reversal.   Taking As Needed    QUEtiapine (SEROQUEL) 50 MG tablet Take 1 tablet (50 mg) by mouth 2 times daily. 90 tablet  1 Past Week    sertraline (ZOLOFT) 100 MG tablet Take 1 tablet (100 mg) by mouth daily 90 tablet 3 Past Week            Current Medications:     Current Facility-Administered Medications   Medication Dose Route Frequency Provider Last Rate Last Admin    buprenorphine HCl-naloxone HCl (SUBOXONE) 4-1 MG per film 1 Film  1 Film Sublingual Daily Shaun Hillman MD   1 Film at 12/08/24 0824    buprenorphine HCl-naloxone HCl (SUBOXONE) 8-2 MG per film 1 Film  1 Film Sublingual Daily Shaun Hillman MD   1 Film at 12/08/24 0824    folic acid (FOLVITE) tablet 1 mg  1 mg Oral Daily Shaun Hillman MD   1 mg at 12/08/24 0823    gabapentin (NEURONTIN) capsule 900-1,200 mg  900-1,200 mg Oral At Bedtime Shaun Hillman MD   900 mg at 12/07/24 2104    multivitamin w/minerals (THERA-VIT-M) tablet 1 tablet  1 tablet Oral Daily Shaun Hillman MD   1 tablet at 12/08/24 0823    QUEtiapine (SEROquel) tablet 50 mg  50 mg Oral At Bedtime Levon Vu DO   50 mg at 12/07/24 2104    sertraline (ZOLOFT) tablet 100 mg  100 mg Oral Daily Shaun Hillman MD   100 mg at 12/08/24 0823    thiamine (B-1) tablet 100 mg  100 mg Oral Daily Shaun Hillman MD   100 mg at 12/08/24 0823     Current Facility-Administered Medications   Medication Dose Route Frequency Provider Last Rate Last Admin    acetaminophen (TYLENOL) tablet 650 mg  650 mg Oral Q4H PRN Deborah Mcgrath APRN CNP        alum & mag hydroxide-simethicone (MAALOX) suspension 30 mL  30 mL Oral Q4H PRN Deborah Mcgrath APRN CNP        cloNIDine (CATAPRES) tablet 0.1-0.2 mg  0.1-0.2 mg Oral BID PRN Shaun Hillman MD   0.2 mg at 12/06/24 1807    diazepam (VALIUM) tablet 5-20 mg  5-20 mg Oral Q30 Min PRN Shaun Hillman MD   10 mg at 12/07/24 1733    hydrOXYzine HCl (ATARAX) tablet 25 mg  25 mg Oral Q4H PRN Deborah Mcgrath, YANICK CNP        loperamide (IMODIUM) capsule 2 mg  2 mg Oral Q4H PRN Deborah Mcgrath, YANICK CNP        naloxone  (NARCAN) injection 0.2 mg  0.2 mg Intravenous Q2 Min PRN Shaun Hillman MD        Or    naloxone (NARCAN) injection 0.4 mg  0.4 mg Intravenous Q2 Min PRN Shaun Hillman MD        Or    naloxone (NARCAN) injection 0.2 mg  0.2 mg Intramuscular Q2 Min PRN Shaun Hillman MD        Or    naloxone (NARCAN) injection 0.4 mg  0.4 mg Intramuscular Q2 Min PRN Shaun Hillman MD        nicotine (COMMIT) lozenge 2 mg  2 mg Buccal Q1H PRN Hailey Lewis PA-C   2 mg at 12/07/24 2104    nicotine polacrilex (NICORETTE) gum 4 mg  4 mg Buccal Q1H PRN Shaun Hillman MD   4 mg at 12/06/24 2333    ondansetron (ZOFRAN ODT) ODT tab 4 mg  4 mg Oral Q6H PRN Deborah Mcgrath APRN CNP   4 mg at 12/07/24 1125    senna-docusate (SENOKOT-S/PERICOLACE) 8.6-50 MG per tablet 1 tablet  1 tablet Oral BID PRN Deborah Mcgrath, APRN CNP        traZODone (DESYREL) tablet 50 mg  50 mg Oral At Bedtime PRN Deborah Mcgrath APRN CNP   50 mg at 12/07/24 0054            Allergies:   No Known Allergies       Labs:     Recent Results (from the past 72 hours)   Alcohol breath test POCT    Collection Time: 12/05/24  1:42 PM   Result Value Ref Range    Alcohol Breath Test 0.26 (A) 0.00 - 0.01   Extra Green Top (Lithium Heparin) Tube    Collection Time: 12/05/24  1:45 PM   Result Value Ref Range    Hold Specimen jic    Extra Purple Top Tube    Collection Time: 12/05/24  1:45 PM   Result Value Ref Range    Hold Specimen JIC    Magnesium    Collection Time: 12/05/24  1:45 PM   Result Value Ref Range    Magnesium 2.1 1.7 - 2.3 mg/dL   Ethyl Alcohol Level    Collection Time: 12/05/24  1:45 PM   Result Value Ref Range    Alcohol ethyl 0.39 (HH) <=0.01 g/dL   CBC with platelets and differential    Collection Time: 12/05/24  1:45 PM   Result Value Ref Range    WBC Count 12.6 (H) 4.0 - 11.0 10e3/uL    RBC Count 5.28 4.40 - 5.90 10e6/uL    Hemoglobin 15.5 13.3 - 17.7 g/dL    Hematocrit 44.5 40.0 - 53.0 %    MCV 84 78 - 100 fL    MCH  29.4 26.5 - 33.0 pg    MCHC 34.8 31.5 - 36.5 g/dL    RDW 14.9 10.0 - 15.0 %    Platelet Count 206 150 - 450 10e3/uL    % Neutrophils 84 %    % Lymphocytes 11 %    % Monocytes 5 %    % Eosinophils 0 %    % Basophils 0 %    % Immature Granulocytes 0 %    NRBCs per 100 WBC 0 <1 /100    Absolute Neutrophils 10.5 (H) 1.6 - 8.3 10e3/uL    Absolute Lymphocytes 1.4 0.8 - 5.3 10e3/uL    Absolute Monocytes 0.6 0.0 - 1.3 10e3/uL    Absolute Eosinophils 0.0 0.0 - 0.7 10e3/uL    Absolute Basophils 0.0 0.0 - 0.2 10e3/uL    Absolute Immature Granulocytes 0.1 <=0.4 10e3/uL    Absolute NRBCs 0.0 10e3/uL   Hemoglobin A1c    Collection Time: 12/05/24  1:45 PM   Result Value Ref Range    Estimated Average Glucose 114 <117 mg/dL    Hemoglobin A1C 5.6 <5.7 %   Comprehensive metabolic panel    Collection Time: 12/05/24  2:50 PM   Result Value Ref Range    Sodium 137 135 - 145 mmol/L    Potassium 4.4 3.4 - 5.3 mmol/L    Carbon Dioxide (CO2) 12 (L) 22 - 29 mmol/L    Anion Gap 29 (H) 7 - 15 mmol/L    Urea Nitrogen 16.9 6.0 - 20.0 mg/dL    Creatinine 1.02 0.67 - 1.17 mg/dL    GFR Estimate >90 >60 mL/min/1.73m2    Calcium 8.8 8.8 - 10.4 mg/dL    Chloride 96 (L) 98 - 107 mmol/L    Glucose 149 (H) 70 - 99 mg/dL    Alkaline Phosphatase 166 (H) 40 - 150 U/L     (H) 0 - 45 U/L    ALT 45 0 - 70 U/L    Protein Total 8.4 (H) 6.4 - 8.3 g/dL    Albumin 4.9 3.5 - 5.2 g/dL    Bilirubin Total 0.4 <=1.2 mg/dL   Lipase    Collection Time: 12/05/24  2:50 PM   Result Value Ref Range    Lipase 56 13 - 60 U/L   TSH with free T4 reflex    Collection Time: 12/05/24  2:50 PM   Result Value Ref Range    TSH 0.60 0.30 - 4.20 uIU/mL   GGT    Collection Time: 12/05/24  2:50 PM   Result Value Ref Range     (H) 8 - 61 U/L   Urine Drug Screen Panel    Collection Time: 12/05/24  5:36 PM   Result Value Ref Range    Amphetamines Urine Screen Negative Screen Negative    Barbituates Urine Screen Negative Screen Negative    Benzodiazepine Urine Screen Positive (A)  Screen Negative    Cannabinoids Urine Screen Negative Screen Negative    Cocaine Urine Screen Negative Screen Negative    Fentanyl Qual Urine Screen Negative Screen Negative    Opiates Urine Screen Negative Screen Negative    PCP Urine Screen Negative Screen Negative   EKG 12-lead, complete    Collection Time: 12/07/24  9:21 AM   Result Value Ref Range    Systolic Blood Pressure  mmHg    Diastolic Blood Pressure  mmHg    Ventricular Rate 97 BPM    Atrial Rate 97 BPM    IL Interval 156 ms    QRS Duration 94 ms     ms    QTc 429 ms    P Axis 65 degrees    R AXIS -6 degrees    T Axis 22 degrees    Interpretation ECG       Sinus rhythm  Normal ECG  No previous ECGs available     Hepatic panel    Collection Time: 12/08/24  7:25 AM   Result Value Ref Range    Protein Total 6.9 6.4 - 8.3 g/dL    Albumin 4.2 3.5 - 5.2 g/dL    Bilirubin Total 0.4 <=1.2 mg/dL    Alkaline Phosphatase 133 40 - 150 U/L    AST 59 (H) 0 - 45 U/L    ALT 38 0 - 70 U/L    Bilirubin Direct <0.20 0.00 - 0.30 mg/dL   Basic metabolic panel    Collection Time: 12/08/24  7:25 AM   Result Value Ref Range    Sodium 136 135 - 145 mmol/L    Potassium 3.6 3.4 - 5.3 mmol/L    Chloride 98 98 - 107 mmol/L    Carbon Dioxide (CO2) 25 22 - 29 mmol/L    Anion Gap 13 7 - 15 mmol/L    Urea Nitrogen 18.7 6.0 - 20.0 mg/dL    Creatinine 0.75 0.67 - 1.17 mg/dL    GFR Estimate >90 >60 mL/min/1.73m2    Calcium 9.7 8.8 - 10.4 mg/dL    Glucose 88 70 - 99 mg/dL   CBC with platelets    Collection Time: 12/08/24  7:25 AM   Result Value Ref Range    WBC Count 6.0 4.0 - 11.0 10e3/uL    RBC Count 4.60 4.40 - 5.90 10e6/uL    Hemoglobin 13.6 13.3 - 17.7 g/dL    Hematocrit 38.5 (L) 40.0 - 53.0 %    MCV 84 78 - 100 fL    MCH 29.6 26.5 - 33.0 pg    MCHC 35.3 31.5 - 36.5 g/dL    RDW 14.0 10.0 - 15.0 %    Platelet Count 117 (L) 150 - 450 10e3/uL   Troponin T, High Sensitivity    Collection Time: 12/08/24  7:25 AM   Result Value Ref Range    Troponin T, High Sensitivity <6 <=22 ng/L  "         Physical Exam:     BP (!) 137/93 (Cuff Size: Adult Large)   Pulse 84   Temp 97.3  F (36.3  C) (Temporal)   Resp 18   Ht 1.854 m (6' 1\")   Wt 97.1 kg (214 lb)   SpO2 98%   BMI 28.23 kg/m    Weight is 214 lbs 0 oz  Body mass index is 28.23 kg/m .    Physical Exam:  Gen: No acute distress  Skin: No diaphoresis or rash  Neuro: No abnormal movements         Physical ROS:   The patient endorsed some stomach pain and chest pain. The remainder of 10-point review of systems was negative except as noted in HPI.             Mental Status Exam:     Mental Status  Patient is casually dressed  Hygiene good  Speech fluent  Thought Process logical  Thought Content:  No suicidal ideation,    No homicidal ideation,   No ideas of reference,    No loose associations,    No auditory hallucinations,     No visual hallucinations   No delusions  Psychomotor: No agitation or slowing  Cognition:  Alert and oriented to time place and person  Attention good  Concentration good  Memory normal including recent and remote memory  Mood:  euthymic  Affect: mood congruent  Judgement: normal  Eye contact good  Cooperation good  Language normal  Fund of knowledge normal  Musculoskeletal normal gait with no abnormal movements         Diagnoses:   Alcohol dependence with intoxication with complication (H)    Patient Active Problem List   Diagnosis    History of alcohol abuse    Opioid use disorder, severe, dependence (H)    EMILY (generalized anxiety disorder)    Current moderate episode of major depressive disorder, unspecified whether recurrent (H)    Drug-induced insomnia (H)    Opioid dependence with withdrawal (H)    Alcohol dependence with intoxication with complication (H)    Depression, unspecified depression type              Assessment:     Patient presents for alcohol dependence  He has completed Valium detox.         Plan:     Discharge today    More than 20 minutes spent on this visit including patient interview, coordination " of care with staff, reviewing medical record, psychoeducation, providing supportive therapy regarding coping with chronic mental illness, entering orders and preparing documentation for the visit      Shaun Hillman MD

## 2024-12-08 NOTE — PLAN OF CARE
"Goal Outcome Evaluation:      Plan of Care Reviewed With: patient    Overall Patient Progress: improvingOverall Patient Progress: improving     MSSA scores  today are 10 and 6. 10mg valium administered x 1. Total valium administered since arrival to the hospital = 140 mg. COWS 8, 3. Pt is slightly tremulous, mildly sweaty, reports an overall fair appetite and denies hallucinations.     Vitals are /88   Pulse (!) 123   Temp 98  F (36.7  C) (Oral)   Resp 16   Ht 1.854 m (6' 1\")   Wt 97.1 kg (214 lb)   SpO2 96%   BMI 28.23 kg/m    Paged for HR PRN -no PRN order was placed.    Re-checked HR 60 min later      Pt reports anxiety rated 4 of 10 in severity and depression 4 of 10 in severity. Endorses feeling a lot better. Pt denies any suicidal ideation plans or intent. Endorses willingness to alert staff if he develops any SI/SIB while hospitalized (contracts for safety).     Pt reports plan for after detox as undecided.     Pt reports no further concerns at this time.         "

## 2024-12-08 NOTE — PROGRESS NOTES
Patient has not required any valium for alcohol detox since 12/7/24 @ 17:33.  All MSSA scores since that time have been less than 8. Pt is now removed from alcohol detox monitoring status. Await disposition likely to home.

## 2024-12-08 NOTE — PLAN OF CARE
Problem: Adult Behavioral Health Plan of Care  Goal: Adheres to Safety Considerations for Self and Others  Outcome: Progressing     Problem: Alcohol Withdrawal  Goal: Optimal Neurologic Function  Outcome: Progressing     Problem: Substance Withdrawal  Goal: Substance Withdrawal  Description: Signs and symptoms of listed problems will be absent or manageable.  Outcome: Progressing   Goal Outcome Evaluation:  Patient was alert, and oriented. Continues to be on detox, MSSA was 6, and 7 at  midnight, and 0500 respectively. Patient appeared irritable when writer woke him up for assessments. Denied pain, depression, anxiety, SI/SIB and other mental illness symptoms. Vitals remained within defined limits. Patient slept for 5.5 hours. No other concerns at this time, team will continue to monitor for needs.

## 2024-12-09 LAB
ATRIAL RATE - MUSE: 97 BPM
DIASTOLIC BLOOD PRESSURE - MUSE: NORMAL MMHG
INTERPRETATION ECG - MUSE: NORMAL
P AXIS - MUSE: 65 DEGREES
PR INTERVAL - MUSE: 156 MS
QRS DURATION - MUSE: 94 MS
QT - MUSE: 338 MS
QTC - MUSE: 429 MS
R AXIS - MUSE: -6 DEGREES
SYSTOLIC BLOOD PRESSURE - MUSE: NORMAL MMHG
T AXIS - MUSE: 22 DEGREES
VENTRICULAR RATE- MUSE: 97 BPM

## 2025-01-09 ENCOUNTER — OFFICE VISIT (OUTPATIENT)
Dept: FAMILY MEDICINE | Facility: CLINIC | Age: 30
End: 2025-01-09
Payer: COMMERCIAL

## 2025-01-09 VITALS
RESPIRATION RATE: 16 BRPM | HEART RATE: 73 BPM | TEMPERATURE: 98.5 F | DIASTOLIC BLOOD PRESSURE: 86 MMHG | SYSTOLIC BLOOD PRESSURE: 122 MMHG | OXYGEN SATURATION: 98 % | BODY MASS INDEX: 31.27 KG/M2 | WEIGHT: 237 LBS

## 2025-01-09 DIAGNOSIS — F10.21 ALCOHOL USE DISORDER, SEVERE, IN EARLY REMISSION (H): ICD-10-CM

## 2025-01-09 DIAGNOSIS — F19.982 DRUG-INDUCED INSOMNIA (H): ICD-10-CM

## 2025-01-09 DIAGNOSIS — F11.20 OPIOID USE DISORDER, SEVERE, DEPENDENCE (H): Primary | ICD-10-CM

## 2025-01-09 PROBLEM — F11.23 OPIOID DEPENDENCE WITH WITHDRAWAL (H): Status: RESOLVED | Noted: 2024-12-06 | Resolved: 2025-01-09

## 2025-01-09 PROBLEM — F10.11 HISTORY OF ALCOHOL ABUSE: Status: RESOLVED | Noted: 2023-07-06 | Resolved: 2025-01-09

## 2025-01-09 PROBLEM — F10.229 ALCOHOL DEPENDENCE WITH INTOXICATION WITH COMPLICATION (H): Status: RESOLVED | Noted: 2024-12-06 | Resolved: 2025-01-09

## 2025-01-09 PROBLEM — F32.A DEPRESSION, UNSPECIFIED DEPRESSION TYPE: Status: RESOLVED | Noted: 2024-12-06 | Resolved: 2025-01-09

## 2025-01-09 LAB — CREAT UR-MCNC: 98 MG/DL

## 2025-01-09 RX ORDER — BUPRENORPHINE AND NALOXONE 12; 3 MG/1; MG/1
1 FILM, SOLUBLE BUCCAL; SUBLINGUAL 2 TIMES DAILY
Qty: 60 FILM | Refills: 0 | Status: SHIPPED | OUTPATIENT
Start: 2025-01-09

## 2025-01-09 RX ORDER — ACAMPROSATE CALCIUM 333 MG/1
TABLET, DELAYED RELEASE ORAL
COMMUNITY
Start: 2025-01-03

## 2025-01-09 RX ORDER — RAMELTEON 8 MG/1
8 TABLET ORAL AT BEDTIME
Qty: 30 TABLET | Refills: 1 | Status: SHIPPED | OUTPATIENT
Start: 2025-01-09

## 2025-01-09 ASSESSMENT — PATIENT HEALTH QUESTIONNAIRE - PHQ9
SUM OF ALL RESPONSES TO PHQ QUESTIONS 1-9: 14
SUM OF ALL RESPONSES TO PHQ QUESTIONS 1-9: 14
10. IF YOU CHECKED OFF ANY PROBLEMS, HOW DIFFICULT HAVE THESE PROBLEMS MADE IT FOR YOU TO DO YOUR WORK, TAKE CARE OF THINGS AT HOME, OR GET ALONG WITH OTHER PEOPLE: VERY DIFFICULT

## 2025-01-09 ASSESSMENT — PAIN SCALES - GENERAL: PAINLEVEL_OUTOF10: MILD PAIN (3)

## 2025-01-09 NOTE — PATIENT INSTRUCTIONS
Increase Suboxone to twice daily.  New prescription sent.    Start Campral which should help with some alcohol cravings as well as alcohol related sleep issues.  Continue Seroquel for now but if you are able to  the ramelteon this can help induce sleep as well.  Hopefully we can get you off Seroquel and just keep you on Campral and ramelteon.    I think transcranial stimulation is a good option for you.  Especially if you failed multiple medicines in the past.  If this does not work then similarly ECT or again assessing you for a different mental health condition with her mood disorder and trying to treat that may actually be the cause of your protracted depression.    Follow-up with me in 1 month for recheck.

## 2025-01-12 ENCOUNTER — HEALTH MAINTENANCE LETTER (OUTPATIENT)
Age: 30
End: 2025-01-12

## 2025-01-13 LAB
BUPRENORPHINE UR CFM-MCNC: 74 NG/ML
BUPRENORPHINE/CREAT UR: 76 NG/MG {CREAT}
GABAPENTIN UR QL CFM: PRESENT
NALOXONE UR CFM-MCNC: 66 NG/ML
NALOXONE: 67 NG/MG {CREAT}
NORBUPRENORPHINE UR CFM-MCNC: ABNORMAL NG/ML

## 2025-01-22 ENCOUNTER — HOSPITAL ENCOUNTER (EMERGENCY)
Facility: CLINIC | Age: 30
Discharge: HOME | End: 2025-01-22
Attending: EMERGENCY MEDICINE | Admitting: EMERGENCY MEDICINE
Payer: COMMERCIAL

## 2025-01-22 VITALS
HEART RATE: 84 BPM | RESPIRATION RATE: 16 BRPM | DIASTOLIC BLOOD PRESSURE: 99 MMHG | SYSTOLIC BLOOD PRESSURE: 145 MMHG | TEMPERATURE: 97.8 F | OXYGEN SATURATION: 98 %

## 2025-01-22 DIAGNOSIS — F11.20 OPIOID USE DISORDER, SEVERE, DEPENDENCE (H): ICD-10-CM

## 2025-01-22 DIAGNOSIS — F32.1 CURRENT MODERATE EPISODE OF MAJOR DEPRESSIVE DISORDER, UNSPECIFIED WHETHER RECURRENT (H): ICD-10-CM

## 2025-01-22 DIAGNOSIS — F10.929 ALCOHOLIC INTOXICATION WITH COMPLICATION: ICD-10-CM

## 2025-01-22 DIAGNOSIS — F41.1 GAD (GENERALIZED ANXIETY DISORDER): ICD-10-CM

## 2025-01-22 LAB
ALBUMIN SERPL BCG-MCNC: 4.8 G/DL (ref 3.5–5.2)
ALP SERPL-CCNC: 162 U/L (ref 40–150)
ALT SERPL W P-5'-P-CCNC: 69 U/L (ref 0–70)
AMPHETAMINES UR QL SCN: NORMAL
ANION GAP SERPL CALCULATED.3IONS-SCNC: 15 MMOL/L (ref 7–15)
APAP SERPL-MCNC: <5 UG/ML (ref 10–30)
AST SERPL W P-5'-P-CCNC: 50 U/L (ref 0–45)
BARBITURATES UR QL SCN: NORMAL
BASOPHILS # BLD AUTO: 0 10E3/UL (ref 0–0.2)
BASOPHILS NFR BLD AUTO: 1 %
BENZODIAZ UR QL SCN: NORMAL
BILIRUB SERPL-MCNC: 0.3 MG/DL
BUN SERPL-MCNC: 9.1 MG/DL (ref 6–20)
BZE UR QL SCN: NORMAL
CALCIUM SERPL-MCNC: 8.8 MG/DL (ref 8.8–10.4)
CANNABINOIDS UR QL SCN: NORMAL
CHLORIDE SERPL-SCNC: 104 MMOL/L (ref 98–107)
CREAT SERPL-MCNC: 0.86 MG/DL (ref 0.67–1.17)
EGFRCR SERPLBLD CKD-EPI 2021: >90 ML/MIN/1.73M2
EOSINOPHIL # BLD AUTO: 0.1 10E3/UL (ref 0–0.7)
EOSINOPHIL NFR BLD AUTO: 1 %
ERYTHROCYTE [DISTWIDTH] IN BLOOD BY AUTOMATED COUNT: 12.8 % (ref 10–15)
ETHANOL SERPL-MCNC: 0.42 G/DL
FENTANYL UR QL: NORMAL
GLUCOSE SERPL-MCNC: 116 MG/DL (ref 70–99)
HCO3 SERPL-SCNC: 25 MMOL/L (ref 22–29)
HCT VFR BLD AUTO: 42.1 % (ref 40–53)
HGB BLD-MCNC: 14.8 G/DL (ref 13.3–17.7)
IMM GRANULOCYTES # BLD: 0 10E3/UL
IMM GRANULOCYTES NFR BLD: 0 %
LYMPHOCYTES # BLD AUTO: 2.4 10E3/UL (ref 0.8–5.3)
LYMPHOCYTES NFR BLD AUTO: 43 %
MCH RBC QN AUTO: 29.2 PG (ref 26.5–33)
MCHC RBC AUTO-ENTMCNC: 35.2 G/DL (ref 31.5–36.5)
MCV RBC AUTO: 83 FL (ref 78–100)
MONOCYTES # BLD AUTO: 0.2 10E3/UL (ref 0–1.3)
MONOCYTES NFR BLD AUTO: 4 %
NEUTROPHILS # BLD AUTO: 2.9 10E3/UL (ref 1.6–8.3)
NEUTROPHILS NFR BLD AUTO: 51 %
NRBC # BLD AUTO: 0 10E3/UL
NRBC BLD AUTO-RTO: 0 /100
OPIATES UR QL SCN: NORMAL
PCP QUAL URINE (ROCHE): NORMAL
PLATELET # BLD AUTO: 243 10E3/UL (ref 150–450)
POTASSIUM SERPL-SCNC: 4.2 MMOL/L (ref 3.4–5.3)
PROT SERPL-MCNC: 7.8 G/DL (ref 6.4–8.3)
RBC # BLD AUTO: 5.07 10E6/UL (ref 4.4–5.9)
SALICYLATES SERPL-MCNC: <0.3 MG/DL
SODIUM SERPL-SCNC: 144 MMOL/L (ref 135–145)
WBC # BLD AUTO: 5.6 10E3/UL (ref 4–11)

## 2025-01-22 PROCEDURE — 84132 ASSAY OF SERUM POTASSIUM: CPT | Performed by: EMERGENCY MEDICINE

## 2025-01-22 PROCEDURE — 82077 ASSAY SPEC XCP UR&BREATH IA: CPT | Performed by: EMERGENCY MEDICINE

## 2025-01-22 PROCEDURE — 80143 DRUG ASSAY ACETAMINOPHEN: CPT | Performed by: EMERGENCY MEDICINE

## 2025-01-22 PROCEDURE — 96376 TX/PRO/DX INJ SAME DRUG ADON: CPT | Performed by: EMERGENCY MEDICINE

## 2025-01-22 PROCEDURE — 84450 TRANSFERASE (AST) (SGOT): CPT | Performed by: EMERGENCY MEDICINE

## 2025-01-22 PROCEDURE — 85041 AUTOMATED RBC COUNT: CPT | Performed by: EMERGENCY MEDICINE

## 2025-01-22 PROCEDURE — 80179 DRUG ASSAY SALICYLATE: CPT | Performed by: EMERGENCY MEDICINE

## 2025-01-22 PROCEDURE — 99285 EMERGENCY DEPT VISIT HI MDM: CPT | Performed by: EMERGENCY MEDICINE

## 2025-01-22 PROCEDURE — 99285 EMERGENCY DEPT VISIT HI MDM: CPT | Mod: 25 | Performed by: EMERGENCY MEDICINE

## 2025-01-22 PROCEDURE — 250N000013 HC RX MED GY IP 250 OP 250 PS 637: Performed by: STUDENT IN AN ORGANIZED HEALTH CARE EDUCATION/TRAINING PROGRAM

## 2025-01-22 PROCEDURE — 36415 COLL VENOUS BLD VENIPUNCTURE: CPT | Performed by: EMERGENCY MEDICINE

## 2025-01-22 PROCEDURE — 84155 ASSAY OF PROTEIN SERUM: CPT | Performed by: EMERGENCY MEDICINE

## 2025-01-22 PROCEDURE — 96374 THER/PROPH/DIAG INJ IV PUSH: CPT | Performed by: EMERGENCY MEDICINE

## 2025-01-22 PROCEDURE — 250N000012 HC RX MED GY IP 250 OP 636 PS 637: Performed by: EMERGENCY MEDICINE

## 2025-01-22 PROCEDURE — 85004 AUTOMATED DIFF WBC COUNT: CPT | Performed by: EMERGENCY MEDICINE

## 2025-01-22 PROCEDURE — 250N000011 HC RX IP 250 OP 636: Performed by: EMERGENCY MEDICINE

## 2025-01-22 PROCEDURE — 96375 TX/PRO/DX INJ NEW DRUG ADDON: CPT | Performed by: EMERGENCY MEDICINE

## 2025-01-22 PROCEDURE — 80307 DRUG TEST PRSMV CHEM ANLYZR: CPT | Performed by: EMERGENCY MEDICINE

## 2025-01-22 PROCEDURE — 250N000013 HC RX MED GY IP 250 OP 250 PS 637: Performed by: EMERGENCY MEDICINE

## 2025-01-22 RX ORDER — ONDANSETRON 2 MG/ML
4 INJECTION INTRAMUSCULAR; INTRAVENOUS EVERY 30 MIN PRN
Status: DISCONTINUED | OUTPATIENT
Start: 2025-01-22 | End: 2025-01-23 | Stop reason: HOSPADM

## 2025-01-22 RX ORDER — CLONIDINE HYDROCHLORIDE 0.1 MG/1
0.1 TABLET ORAL 2 TIMES DAILY
Status: DISCONTINUED | OUTPATIENT
Start: 2025-01-22 | End: 2025-01-23 | Stop reason: HOSPADM

## 2025-01-22 RX ORDER — OLANZAPINE 10 MG/1
2.5 INJECTION, POWDER, LYOPHILIZED, FOR SOLUTION INTRAMUSCULAR
Status: DISCONTINUED | OUTPATIENT
Start: 2025-01-22 | End: 2025-01-23 | Stop reason: HOSPADM

## 2025-01-22 RX ORDER — OLANZAPINE 10 MG/1
2.5 INJECTION, POWDER, LYOPHILIZED, FOR SOLUTION INTRAMUSCULAR
Status: DISCONTINUED | OUTPATIENT
Start: 2025-01-22 | End: 2025-01-22

## 2025-01-22 RX ORDER — OLANZAPINE 5 MG/1
10 TABLET, ORALLY DISINTEGRATING ORAL 2 TIMES DAILY PRN
Status: DISCONTINUED | OUTPATIENT
Start: 2025-01-22 | End: 2025-01-23 | Stop reason: HOSPADM

## 2025-01-22 RX ORDER — GABAPENTIN 300 MG/1
900 CAPSULE ORAL AT BEDTIME
Status: DISCONTINUED | OUTPATIENT
Start: 2025-01-22 | End: 2025-01-23 | Stop reason: HOSPADM

## 2025-01-22 RX ORDER — QUETIAPINE FUMARATE 25 MG/1
50 TABLET, FILM COATED ORAL AT BEDTIME
Status: DISCONTINUED | OUTPATIENT
Start: 2025-01-22 | End: 2025-01-23 | Stop reason: HOSPADM

## 2025-01-22 RX ORDER — BUPRENORPHINE AND NALOXONE 4; 1 MG/1; MG/1
3 FILM, SOLUBLE BUCCAL; SUBLINGUAL 2 TIMES DAILY
Status: DISCONTINUED | OUTPATIENT
Start: 2025-01-22 | End: 2025-01-23 | Stop reason: HOSPADM

## 2025-01-22 RX ORDER — SERTRALINE HYDROCHLORIDE 100 MG/1
100 TABLET, FILM COATED ORAL DAILY
Status: DISCONTINUED | OUTPATIENT
Start: 2025-01-23 | End: 2025-01-23 | Stop reason: HOSPADM

## 2025-01-22 RX ADMIN — CLONIDINE HYDROCHLORIDE 0.1 MG: 0.1 TABLET ORAL at 21:51

## 2025-01-22 RX ADMIN — QUETIAPINE FUMARATE 50 MG: 25 TABLET ORAL at 21:51

## 2025-01-22 RX ADMIN — OLANZAPINE 2.5 MG: 10 INJECTION, POWDER, FOR SOLUTION INTRAMUSCULAR at 16:43

## 2025-01-22 RX ADMIN — OLANZAPINE 10 MG: 5 TABLET, ORALLY DISINTEGRATING ORAL at 13:12

## 2025-01-22 RX ADMIN — GABAPENTIN 900 MG: 300 CAPSULE ORAL at 21:51

## 2025-01-22 RX ADMIN — ONDANSETRON 4 MG: 2 INJECTION, SOLUTION INTRAMUSCULAR; INTRAVENOUS at 13:11

## 2025-01-22 RX ADMIN — NICOTINE POLACRILEX 4 MG: 2 GUM, CHEWING BUCCAL at 21:53

## 2025-01-22 RX ADMIN — ONDANSETRON 4 MG: 2 INJECTION, SOLUTION INTRAMUSCULAR; INTRAVENOUS at 20:26

## 2025-01-22 RX ADMIN — BUPRENORPHINE AND NALOXONE 3 FILM: 4; 1 FILM BUCCAL; SUBLINGUAL at 21:59

## 2025-01-22 ASSESSMENT — ACTIVITIES OF DAILY LIVING (ADL)
ADLS_ACUITY_SCORE: 41

## 2025-01-22 ASSESSMENT — COLUMBIA-SUICIDE SEVERITY RATING SCALE - C-SSRS
3. HAVE YOU BEEN THINKING ABOUT HOW YOU MIGHT KILL YOURSELF?: YES
5. HAVE YOU STARTED TO WORK OUT OR WORKED OUT THE DETAILS OF HOW TO KILL YOURSELF? DO YOU INTEND TO CARRY OUT THIS PLAN?: YES
2. HAVE YOU ACTUALLY HAD ANY THOUGHTS OF KILLING YOURSELF IN THE PAST MONTH?: YES
1. IN THE PAST MONTH, HAVE YOU WISHED YOU WERE DEAD OR WISHED YOU COULD GO TO SLEEP AND NOT WAKE UP?: YES
IS THE PATIENT NOT ABLE TO COMPLETE C-SSRS: REFUSES TO ANSWER

## 2025-01-22 NOTE — ED TRIAGE NOTES
Pt recently in treatment for ETOH and narcotic abuse, was in out patient treatment and hadn't been seen or heard from in 2 days.  Mother went for well person check and pt was found minimally responsive with 52  various ETOH bottle around him.  Also siboxone.         Pt states he is suicidal and wants to die, and has plans to hang himself.  Pt is throwing emesis bags around the room and swearing.     Charge RN notified and sitter here for safety.  Pt not compliant at this time. MD in room and pt up to BR with standby assist of MD.       Will await orders.      Triage Assessment (Adult)       Row Name 01/22/25 1201          Triage Assessment    Airway WDL WDL        Respiratory WDL    Respiratory WDL WDL        Skin Circulation/Temperature WDL    Skin Circulation/Temperature WDL WDL        Cardiac WDL    Cardiac WDL WDL        Peripheral/Neurovascular WDL    Peripheral Neurovascular WDL WDL        Cognitive/Neuro/Behavioral WDL    Cognitive/Neuro/Behavioral WDL WDL

## 2025-01-22 NOTE — ED PROVIDER NOTES
ED Provider Note  [facility]      History     Chief Complaint   Patient presents with    Alcohol Intoxication     HPI  Hemanth Hurd is a 29 year old male who presents to the emergency department with EMS.  History obtained from EMS initially reports that mother had gone over to do a welfare check on the patient and patient was noted to be with multiple different alcohol shooter bottles around his body.  Was noted to be intoxicated, and therefore EMS transports patient to the emergency department based on additional suicidal statements.    Additional history from mother reports that patient has had longstanding history of alcoholism, opioid abuse, in addition to depression.  Most recently, patient was hospitalized at [facility] from December 5 through December 8.  I reviewed that hospital stay.  After that hospitalization, patient went to alcohol and drug treatment facility, [facility], and received treatment at that rehab facility.  Patient went home just prior to Christmas.  He has been attending outpatient daily alcohol and mental health outpatient treatment, 5 days/week for approximately 5 hours/day at [facility] in [place], [place].  Was individuals had not heard from the patient yesterday, or today, and therefore they notified mother, who performed a welfare check, and noted to be patient in intoxicated, altered state.  Therefore, EMS was called, and patient transported the emergency department.    Patient arrives intoxicated, dry heaving, with limited additional history information able to be obtained secondary to patient's level of intoxication.        Independent Historian:        Review of External Notes:  Reviewed December 5 through December 8 hospital stay.  This is copied as below.      Patient states that he has had a drinking problem since age 17. He has had 2 prior treatments including 2 years ago and 1 year ago. His longest sobriety is 40-50 days. His most  recent treatment was one year ago at [facility].      Over the past several months he has been using 1.5 liters of vodka per day.     Patient has never had DUI. He states that he might have had a seizure in the past but is unsure.  He has had blackouts.     Patient is treated for Zoloft for depression which he has been using for past 2 years and he does find it to be helpful. He denies depression currently and denies suicidal or homicidal thoughts. He denies history of mae or hypomania.     Patient has a history of opiate dependence, but he was able to stop them one year ago. He is Suboxone maintenance.     According to ER report:  Hemanth Hurd is a 29 year old male with history of alcohol use disorder, opiate use disorder (was on Suboxone strips x 12-3 mg twice daily, last prescription given in September 2024) who presents to the emergency department with suicidal ideation in setting of alcohol use disorder.  He reports drinking 1.75 L of liquor daily.  He last drank about 2 hours prior to arrival.  He notes prior history of alcohol withdrawal seizures.  Patient has been off Suboxone x 3 days. He is having dry heaving, nausea.   Tian Esquivel MD   [facility] EMERGENCY DEPARTMENT  12/5/2024      Allergies:  No Known Allergies    Problem List:    Patient Active Problem List    Diagnosis Date Noted    Alcohol use disorder, severe, in early remission (H) 01/09/2025     Priority: Medium    Opioid use disorder, severe, dependence (H) 07/10/2024     Priority: Medium    EMILY (generalized anxiety disorder) 07/10/2024     Priority: Medium    Current moderate episode of major depressive disorder, unspecified whether recurrent (H) 07/10/2024     Priority: Medium    Drug-induced insomnia (H) 07/10/2024     Priority: Medium        Past Medical History:    Past Medical History:   Diagnosis Date    History of alcohol abuse     History of benzodiazepine use     History of opiate therapy        Past  Surgical History:    No past surgical history on file.    Family History:    Family History   Problem Relation Age of Onset    Rheumatoid Arthritis Mother     Mental Illness Father        Social History:  Marital Status:  Single [1]  Social History     Tobacco Use    Smoking status: Former     Types: Cigarettes    Smokeless tobacco: Former     Types: Chew     Quit date: 12/2021    Tobacco comments:     Only when unable to vape   Vaping Use    Vaping status: Every Day    Substances: Nicotine, Flavoring    Devices: Refillable tank   Substance Use Topics    Alcohol use: Not Currently     Comment: hx of abuse    Drug use: Not Currently     Types: Benzodiazepines, Opiates        Medications:    acamprosate (CAMPRAL) 333 MG EC tablet  Buprenorphine HCl-Naloxone HCl (SUBOXONE) 12-3 MG FILM per film  cloNIDine (CATAPRES) 0.1 MG tablet  gabapentin (NEURONTIN) 300 MG capsule  modafinil (PROVIGIL) 100 MG tablet  naloxone (NARCAN) 4 MG/0.1ML nasal spray  QUEtiapine (SEROQUEL) 50 MG tablet  ramelteon (ROZEREM) 8 MG tablet  sertraline (ZOLOFT) 100 MG tablet          Review of Systems  A medically appropriate review of systems was performed with pertinent positives and negatives noted in the HPI, and all other systems negative.    Physical Exam   Patient Vitals for the past 24 hrs:   BP Temp Temp src Pulse Resp SpO2   01/22/25 1205 115/75 98.2  F (36.8  C) Axillary 84 16 97 %          Physical Exam  General: Awake, stumbling, slurring words, dry heaving.  Head: atraumatic, normocephalic  Lungs:  nonlabored  CV:  extremities warm and perfused  Abd: nondistended  Skin: no rashes, no diaphoresis and skin color normal  Neuro: Patient awake, slurring words.        ED Course                 Procedures                          Results for orders placed or performed during the hospital encounter of 01/22/25 (from the past 24 hours)   CBC with platelets differential    Narrative    The following orders were created for panel order CBC with  platelets differential.  Procedure                               Abnormality         Status                     ---------                               -----------         ------                     CBC with platelets and d...[[id number]]                      Final result                 Please view results for these tests on the individual orders.   Comprehensive metabolic panel   Result Value Ref Range    Sodium 144 135 - 145 mmol/L    Potassium 4.2 3.4 - 5.3 mmol/L    Carbon Dioxide (CO2) 25 22 - 29 mmol/L    Anion Gap 15 7 - 15 mmol/L    Urea Nitrogen 9.1 6.0 - 20.0 mg/dL    Creatinine 0.86 0.67 - 1.17 mg/dL    GFR Estimate >90 >60 mL/min/1.73m2    Calcium 8.8 8.8 - 10.4 mg/dL    Chloride 104 98 - 107 mmol/L    Glucose 116 (H) 70 - 99 mg/dL    Alkaline Phosphatase 162 (H) 40 - 150 U/L    AST 50 (H) 0 - 45 U/L    ALT 69 0 - 70 U/L    Protein Total 7.8 6.4 - 8.3 g/dL    Albumin 4.8 3.5 - 5.2 g/dL    Bilirubin Total 0.3 <=1.2 mg/dL   Alcohol ethyl   Result Value Ref Range    Alcohol ethyl 0.42 (HH) <=0.01 g/dL   Salicylate level   Result Value Ref Range    Salicylate <0.3   mg/dL   Acetaminophen level   Result Value Ref Range    Acetaminophen <5.0 (L) 10.0 - 30.0 ug/mL   CBC with platelets and differential   Result Value Ref Range    WBC Count 5.6 4.0 - 11.0 10e3/uL    RBC Count 5.07 4.40 - 5.90 10e6/uL    Hemoglobin 14.8 13.3 - 17.7 g/dL    Hematocrit 42.1 40.0 - 53.0 %    MCV 83 78 - 100 fL    MCH 29.2 26.5 - 33.0 pg    MCHC 35.2 31.5 - 36.5 g/dL    RDW 12.8 10.0 - 15.0 %    Platelet Count 243 150 - 450 10e3/uL    % Neutrophils 51 %    % Lymphocytes 43 %    % Monocytes 4 %    % Eosinophils 1 %    % Basophils 1 %    % Immature Granulocytes 0 %    NRBCs per 100 WBC 0 <1 /100    Absolute Neutrophils 2.9 1.6 - 8.3 10e3/uL    Absolute Lymphocytes 2.4 0.8 - 5.3 10e3/uL    Absolute Monocytes 0.2 0.0 - 1.3 10e3/uL    Absolute Eosinophils 0.1 0.0 - 0.7 10e3/uL    Absolute Basophils 0.0 0.0 - 0.2 10e3/uL    Absolute  Immature Granulocytes 0.0 <=0.4 10e3/uL    Absolute NRBCs 0.0 10e3/uL       MEDICATIONS GIVEN IN THE EMERGENCY DEPARTMENT:  Medications   OLANZapine zydis (zyPREXA) ODT tab 10 mg (10 mg Oral $Given 1/22/25 1312)   ondansetron (ZOFRAN) injection 4 mg (4 mg Intravenous $Given 1/22/25 2026)   OLANZapine (zyPREXA) IV injection 2.5 mg (has no administration in time range)   Buprenorphine HCl-Naloxone HCl (SUBOXONE) 12-3 MG per film 1 Film (has no administration in time range)   cloNIDine (CATAPRES) tablet 0.1 mg (has no administration in time range)   gabapentin (NEURONTIN) capsule 900 mg (has no administration in time range)   QUEtiapine (SEROquel) tablet 50 mg (has no administration in time range)   sertraline (ZOLOFT) tablet 100 mg (has no administration in time range)           Independent Interpretation (X-rays, CTs, rhythm strip):  None    Consultations/Discussion of Management or Tests:  DEC pending       Social Determinants of Health affecting care:  Social Connections/Isolation       Assessments & Plan (with Medical Decision Making)  29 year old male who presents to the Emergency Department for evaluation of alcohol intoxication, with stated history of depression, and opioid abuse.  Currently on Suboxone.  Does have longstanding history of alcoholism.  Historically has drank 1 to 2 L of vodka daily.  Appears to have had recent relapse.  Patient has been attending outpatient treatment facility, [facility].  Patient did not show up yesterday, or today, and therefore mother was notified for welfare check.  Follow patient with multiple alcohol bottles around his body.  Was altered, intoxicated, and has been making recent statements about feelings of helplessness, hopelessness, and worthlessness.  Therefore, EMS transport patient to the emergency department for further evaluation.    Patient has appropriately metabolized some of his alcohol throughout his ED course.  He has now been present in the emergency  department for 8 hours.    Upon further clarification with the patient, he is uncertain of his last Suboxone administration.  Some of his nausea, and vomiting currently may be secondary to narcotic withdrawal, and patient will be administered his evening Suboxone dose.  Additionally, I have ordered his home medications to be administered.    Patient is now alert, oriented, and I feel he is appropriate for DEC evaluation at this point.    Patient will be signed out to oncoming overnight provider pending DEC evaluation, with ultimate disposition pending.  He does have outpatient day program at [facility] which he has not attended for the past 2 days, however has historically been attending this previously, 5 days/week, 5 hours daily.    Ultimately, disposition pending further clarification with regards to suicidality.       I have reviewed the nursing notes.    I have reviewed the findings, diagnosis, plan and need for follow up with the patient.         Medical Decision Making  The patient's presentation was of high complexity (a chronic illness severe exacerbation, progression, or side effect of treatment).  Acute exacerbation of alcoholism.  Also with exacerbation of depression    The patient's evaluation involved:  an assessment requiring an independent historian (see separate area of note for details)  review of external note(s) from 1 sources (see separate area of note for details)  ordering and/or review of 3+ test(s) in this encounter (see separate area of note for details)  discussion of management or test interpretation with another health professional (DEC pending)    The patient's management necessitated further care after sign-out to overnight provider (see their note for further management).        NEW PRESCRIPTIONS STARTED AT TODAY'S ER VISIT  New Prescriptions    No medications on file       Final diagnoses:   Alcoholic intoxication with complication   Opioid use disorder, severe, dependence (H)    Current moderate episode of major depressive disorder, unspecified whether recurrent (H)   EMILY (generalized anxiety disorder)       1/22/2025   [facility] EMERGENCY DEPT       Gabriel Adame MD  01/22/25 2035

## 2025-01-22 NOTE — ED NOTES
Pt now awake and loudly moaning and grunting and wretching.   Difficult to get pt to focus and use his words.  Pt has a HA and is nauseated.  Will discuss with MD Adame.       Mother just called and has been updated.

## 2025-01-22 NOTE — ED NOTES
20g IV started, labs drawn and sent.  Pt medicated per orders.  Lights out in room, 1:1 sitter at bedside. Pt room safe,

## 2025-01-23 ENCOUNTER — TELEPHONE (OUTPATIENT)
Dept: BEHAVIORAL HEALTH | Facility: CLINIC | Age: 30
End: 2025-01-23
Payer: COMMERCIAL

## 2025-01-23 NOTE — DISCHARGE INSTRUCTIONS
[facility] ([facility])  Adult Intensive Outpatient program and DBT programs  Phone: [phone]    [facility]  Adult Intensive Outpatient program and DBT program  Phone: [phone]    Substance Abuse Resources    To access substance abuse treatment you must have an assessment complete or have an updated assessment within 30 days of starting any program.  Information for this to be completed and to secure funding if you have medical assistance or no insurance can be found through your County's chemical health intake line.    If you have private insurance, call the customer service number on the back of your insurance card to find an in-network provider for substance abuse assessment. The ideal provider will be a treatment facility, licensed in the [facility].    For those with Medicaid with the [facility], you will need a Rule 25 assessment: The following are phone numbers for each [facility]. Rule 25 assessments must be completed by the county you reside in currently. Once approved for funding you can connect with a facility that does Rule 25 assessment.  [facility] [phone]  [facility] [phone]  [facility] [phone]  [facility] [phone]  [place] - [phone]  [facility] [phone]  [place] - [phone]  [facility] [phone]    The following facilities offer Rule 25/chemical health assessments:    [facility]  [phone]  Mon-Friday: [address] [facility], [address]  Saturday:  [address], [place], [address]  M-F assessments (7a-1:45p); Saturday assessments (7:45-10:45a)    [facility]  [phone]  [address] [place], [place], [address]  *by appointment only M-W; walk ins available Fridays from 10-2.    [facility]  [phone] (phone consultation available 24/7)  In-person Assessments:  [address], [address], [place] [address] [address] [place] [address]  [address] [place] [address],  [place] [address]  [address] [place] [address]     [facility]  [phone]  [address] [facility] [address] [facility]  [place], [place], [address]  *walk in and appointments available by calling    [facility]  [phone]  [address] [place], [place], [address]  *by appointment only M-Th    [facility]  [phone]  [address] [place], [place], [address]  *walk ins available M-F    [facility]  [phone]  [address] [place], [place], [address]  *available by appointments only    [facility]  [phone]  [address] [place], [place], [address]  *available by appointment only    [facility]  [phone]  [facility] - Outpatient Mental Health and Addiction  [address] [place], [place], [address]    [facility] (AD[facility])  [phone]  [address] [place] [address], [address]  *Walk in assessments also available M-F starting at 8 am.    [facility]  [phone]  [address] [place], [place], [address]  *available by appointments    [facility]  [phone]  [address] [place], [place], [address]  *walk in assessments available M-F starting at 7 am.    [facility]  [phone]  [facility]  [address] [place], [place], [address]  *Walk in assessments availble M-F starting at 8 am OR [phone]    [facility]  [address] [place] [address]  *Walk in assessments available M-F starting at 8 am    [facility]  [phone]  [address] [place] [address]    [facility]  [phone]  [address] [place], [place], [address]  *available by appointment only    [facility]  [phone]  [address]  [place], [place], [address]    [facility]  [phone]  [address] [place] [address], [address]  *available by appointment    [facility]  [phone]  [address]  [place], [place], [address]  *walk ins available M-TH from 9-3    [facility]  [phone]  [address]  [place], [place], [address]    [place]  [phone]  [address] [place], [place] [address] [address] [address], [place] [address]    [facility] (Does Rule 25 Assessments)  [email or web address]  [phone]  [address] [place] [address], [address], [place] [address]    [facility]  [email or web address]-treatment  [phone], [address], [place] [address]  [phone], [address], [place], [place] [id number] [phone], [address] [facility], [address], [place] [address]  [phone], [address] [facility] , [address], ([facility]), [place], [place] [id number] [phone], [address] [address], [place] [address]      If you are intoxicated, you may be required to detox at a detox facility before starting treatment. The following are detox facilities that you can self present to. All detox facilities are able to help you complete an assessment/rule 25 prior to discharge if you choose:    [facility]: [address] [place], [place], [address].         [phone]    [facility]: [address] [place], [place], [address]  [phone]    [facility]: [address] [place], [place], [address] [phone]    [facility]: [address] [place], [place], [address] [phone]    Recommendations:  It is recommended that you abstain from all mood altering chemicals. Please contact the sober support hotline (900-980-9572) as needed; phones are answered 24 hours a day, 7 days a week. Other support hotlines include:    [facility] Mental Health & Addiction: [phone]    [facility] Support Line: [phone]    Ways to help cope with sobriety:    -- Take prescribed medicines as scheduled  -- Keep  follow-up appointments  -- Talk to others about your concerns  -- Get regular exercise  -- Practice deep breathing skills  -- Eat a healthy diet  -- Use community resources, including hotline numbers, [facility] crisis and support meetings  -- Stay sober and avoid places/people/things associated with substance use  --Maintain a daily schedule/routine  --Get at least 7-8 hours of sleep per night  --Create a list 10--20 healthy activities that you can do that are enjoyable and do not involve substance use  --Create daily goals (approx. 1-4 goals) per day and work to achieve them throughout the day.     Free Resources:    [facility] Connection ([facility])  [facility] connects people seeking recovery to resources that help foster and sustain long-term recovery. Whether you are seeking resources for treatment, transportation, housing, job training, education, health care or other pathways to recovery, [facility] is a great place to start.  Phone: [phone]. www.[place]TakeLessons.My COI (Great listing of all types of recovery and non-recovery related resources)    Alcoholics Anonymous  Phone: 4-799-ALCOHOL  Website: HTTP://WWW.AA.ORG/    [facility] [place] (757-782-7309 or http://a[facility]neaPhysicians Reference Laboratory.org)  [facility] [place] (707-501-7429 or www.aasaul.org)  Narcotics Anonymous  Phone: 591.945.2298  Website: www.[employer].Foundshopping.com.    [facility]  [address] [place] [address], [address], [place], [place],  Phone: [phone]  Drop-in Hours: Monday-Friday 9-11:30 am. By appointment at other times.  Provides: [facility] is a drop-in center on the east side [facility] that provides a safe space for individuals who are homeless and have a history of chemical use. Sobriety is not a requirement but drugs and alcohol are not allowed on the property.  Services: Non-clients can access drop-in services such as Recovery and Harm Reduction Groups, referrals to case management, community activities, shower facilities, and a pool table. Individuals who are  homeless and have chemical health needs may be eligible for enrollment into [facility]'s case management program. Clients and  work together to access benefits, treatment, health care, shelter, and external housing resources.

## 2025-01-23 NOTE — CONSULTS
"Diagnostic Evaluation Consultation  Crisis Assessment    Patient Name: Hemanth Hurd  Age:  29 year old  Legal Sex: male  Gender Identity: male  Pronouns:   Race: White  Ethnicity: Not  or   Language: English      Patient was assessed: Virtual: [place]   Crisis Assessment Start Date: 01/22/25  Crisis Assessment Start Time: 2128  Crisis Assessment Stop Time: 2138  Patient location: [facility] Emergency Dept                             ED05    Referral Data and Chief Complaint  Hemanth Hurd presents to the ED via EMS. Patient is presenting to the ED for the following concerns: Intoxication, Depression, Suicidal ideation. Factors that make the mental health crisis life threatening or complex are: History obtained from EMS report that after not hearing from pt for two days, his mother had gone over to do a welfare check on the patient and patient was noted to be with multiple different alcohol shooter bottles around his body.  Pt was visibility intoxicated and minimally responsive and so was transported to ED. Upon arrival to ED pt stated he was suicidal and wants to die, and has plans to hang himself. Pt became aggressive and was reportedly throwing emesis bags around the room and swearing. Pt was given medication, time, and allowed to sleep and sober for several hours prior to assessment. Upon assessment, pt was irritable, minimally responsive, and could not remember what happened prior to coming to the ED or how he got to the ED. He does not remember making suicidal statements and now denies having ever had passive or active SI. Pt refused to tell writer about barriers to maintaining sobriety, saying \"life happened, I'm not going to tell you\" when writer asked about specific stressors or service gaps. Pt denies HI, AH/VH, or changes in appetite or sleep..      Informed Consent and Assessment Methods  Explained the crisis assessment process, including applicable information " disclosures and limits to confidentiality, assessed understanding of the process, and obtained consent to proceed with the assessment.  Assessment methods included conducting a formal interview with patient, review of medical records, collaboration with medical staff, and obtaining relevant collateral information from family and community providers when available.  : done     History of the Crisis   Per provider note, with information obtained from pt's mother, pt has historical diagnosis of alcoholism, opioid abuse, depression, and anxiety.  Most recently, patient was hospitalized at [facility] from December 5 through December 8.  After that hospitalization, patient went to alcohol and drug treatment facility, [facility], and received treatment at that rehab facility.  Patient went home just prior to [place].  He has been attending outpatient daily alcohol and mental health outpatient treatment, 5 days/week for approximately 5 hours/day at [facility] in [place], [place].    Brief Psychosocial History  Family:  Single, Children yes (2.5year old daughter)  Support System:  Parent(s), Sibling(s)  Employment Status:  unemployed  Source of Income:  public assistance  Financial Environmental Concerns:  none  Current Hobbies:  family functions, music  Barriers in Personal Life:  behavioral concerns    Significant Clinical History  Current Anxiety Symptoms:     Current Depression/Trauma:  irritable  Current Somatic Symptoms:     Current Psychosis/Thought Disturbance:  impulsive, hostile/aggressive, anger, high risk behavior  Current Eating Symptoms:     Chemical Use History:  Alcohol: Daily, Blackouts  Last Use:: 01/22/25  Benzodiazepines: None  Opiates: Rx abuse  Cocaine: None  Marijuana: None  Other Use: None  Withdrawal Symptoms: Tremors, Nausea, Myalgias   Past diagnosis:  Substance Use Disorder, Anxiety Disorder, Depression  Family history:  No known history of mental health or chemical health concerns  Past  "treatment:  Residential Treatment, AA/NA, Other, Psychiatric Medication Management, Primary Care, Supportive Living Environment (group home, care home house, etc) (CD residential treatment)  Details of most recent treatment:  He has been attending outpatient daily alcohol and mental health outpatient treatment, 5 days/week for approximately 5 hours/day at [facility] in [place], [place].  Other relevant history:  Per [employer] pt has legal history of charge for 3rd degree assault in 2016. No other legal convictions. No  history or trauma history. Not being treated for any medical conditions    Have there been any medication changes in the past two weeks:  no       Is the patient compliant with medications:  yes        Collateral Information  Is there collateral information: Yes     Collateral information name, relationship, phone number:  Bernie Leger (Mother) [phone]    What happened today: Bernie got a call from the therapist at 's programmatic care saying \"we can't locate him, if you can't find him,then we need to do a welfare check\". Bernie and her daughter then went to 's apartment and found him surrounded by an estimated 200 shooters of 99 bananas with the apartment destroyed and garbage everywhere. He was incoherant and so we called EMS     What is different about patient's functioning: Every time there is a relapse it gets worse. He has now been to treatment 4-5 times. After getting sober he is fine for a few weeks and then the cycle just keeps repeating. He did recently complete treatment that focused on his mental health and we thought that would change the cycle. But apparently not.     What do you think the patient needs:      Has patient made comments about wanting to kill themselves/others: no    If d/c is recommended, can they take part in safety/aftercare planning:  yes    Additional collateral information:        Risk Assessment  Gallup Suicide Severity Rating Scale Full Clinical " Version:  Suicidal Ideation  Q1 Wish to be Dead (Lifetime): No  Q2 Non-Specific Active Suicidal Thoughts (Lifetime): No  Q6 Suicide Behavior (Lifetime): no     Suicidal Behavior (Lifetime)  Has subject engaged in non-suicidal self-injurious behavior? (Lifetime): No  Interrupted Attempts (Lifetime): No  Aborted or Self-Interrupted Attempt (Lifetime): No  Preparatory Acts or Behavior (Lifetime): No    [place] Suicide Severity Rating Scale Recent:   Suicidal Ideation (Recent)  Q1 Wished to be Dead (Past Month): no  Q2 Suicidal Thoughts (Past Month): no  Q3 Suicidal Thought Method: no  Q5 Suicide Intent with Specific Plan: no  Level of Risk per Screen: no risks indicated     Suicidal Behavior (Recent)  Actual Attempt (Past 3 Months): No  Has subject engaged in non-suicidal self-injurious behavior? (Past 3 Months): No  Interrupted Attempts (Past 3 Months): No  Aborted or Self-Interrupted Attempt (Past 3 Months): No  Preparatory Acts or Behavior (Past 3 Months): No    Environmental or Psychosocial Events: ongoing abuse of substances, impulsivity/recklessness, unemployment/underemployment, other life stressors  Protective Factors: Protective Factors: strong bond to family unit, community support, or employment, able to access care without barriers, lives in a responsibly safe and stable environment    Does the patient have thoughts of harming others? Feels Like Hurting Others: no  Previous Attempt to Hurt Others: no  Is the patient engaging in sexually inappropriate behavior?: no  Does Patient have a known history of aggressive behavior: No  Does patient have history of aggression in hospital: earlier while sobering up, pt was swearing and throwing emesis bags    Is the patient engaging in sexually inappropriate behavior?  no        Mental Status Exam   Affect: Flat  Appearance: Appropriate  Attention Span/Concentration: Attentive  Eye Contact: Avoidant    Fund of Knowledge: Appropriate   Language /Speech Content:  Fluent  Language /Speech Volume: Normal  Language /Speech Rate/Productions: Minimally Responsive  Recent Memory: Poor  Remote Memory: Poor  Mood: Angry  Orientation to Person: Yes   Orientation to Place: No  Orientation to Time of Day: No  Orientation to Date: No     Situation (Do they understand why they are here?): Yes  Psychomotor Behavior: Agitated  Thought Content: Clear  Thought Form: Goal Directed     Mini-Cog Assessment  Number of Words Recalled:    Clock-Drawing Test:     Three Item Recall:    Mini-Cog Total Score:       Medication  Psychotropic medications:   Medication Orders - Psychiatric (From admission, onward)      Start     Dose/Rate Route Frequency Ordered Stop    01/23/25 0800  sertraline (ZOLOFT) tablet 100 mg         100 mg Oral DAILY 01/22/25 2033 01/22/25 2200  QUEtiapine (SEROquel) tablet 50 mg         50 mg Oral AT BEDTIME 01/22/25 2033 01/22/25 2141  nicotine (NICORETTE) gum 4 mg         4 mg Buccal EVERY 1 HOUR PRN 01/22/25 2148 01/22/25 2027  OLANZapine (zyPREXA) IV injection 2.5 mg         2.5 mg  over 1 Minutes Intravenous EVERY 1 HOUR PRN 01/22/25 2027 01/22/25 1224  OLANZapine zydis (zyPREXA) ODT tab 10 mg         10 mg Oral 2 TIMES DAILY PRN 01/22/25 1225               Current Care Team  Patient Care Team:  Hiram Horner PA-C as PCP - General (Family Medicine)  Hiram Horner PA-C as Assigned PCP    Diagnosis  Patient Active Problem List   Diagnosis Code    Opioid use disorder, severe, dependence (H) F11.20    EMILY (generalized anxiety disorder) F41.1    Current moderate episode of major depressive disorder, unspecified whether recurrent (H) F32.1    Drug-induced insomnia (H) F19.982    Alcohol use disorder, severe, in early remission (H) F10.21       Primary Problem This Admission  Active Hospital Problems    Alcohol use disorder, severe, in early remission (H)      *Current moderate episode of major depressive disorder, unspecified whether recurrent  (H)        Clinical Summary and Substantiation of Recommendations   Clinical Substantiation:  After therapeutic assessment, intervention and aftercare planning by ED care team and LM and in consultation with attending provider, the patient's circumstances and mental state were appropriate for outpatient management. Pt was brought to ED by EMS after his mother had gone over to do a welfare check on the patient and patient was noted to be with multiple different alcohol shooter bottles around his body.  Pt was visibility intoxicated and minimally responsive and so was transported to ED. Upon arrival to ED pt stated he was suicidal and wants to die, and has plans to hang himself. Pt was given medication, time, and allowed to sleep and sober for several hours . Upon sobering, he does not remember making suicidal statements and now denies having ever had passive or active SI. Pt was able to engage in safety planning and identified community and family support contacts. Writer offered Rule 25 referral, and resources for alternative dual diagnosis programmatic care options in case he can no longer return to his current [facility] program through [facility], but pt declined. Collateral is aware of this. Writer put resources in AVS in case pt changes his mind.    Goals for crisis stabilization:  n/a    Next steps for Care Team:  n/a    Treatment Objectives Addressed:  orienting the patient to therapy, identifying additional supports, exploring obstacles to safety in the community, assessing safety    Therapeutic Interventions:  Explored motivation for maintaining sobriety., Completed behavior chain analysis.    Has a specific means been identified for suicidal/homicide actions: No    If yes, describe:       Explain action steps toward mitigation:       Document completion of mitigation actions:       The follow up action still needed prior to discharge:       Patient coping skills attempted to reduce the crisis:   sleep    Disposition  Recommended referrals: [facility] Comprehensive Assessment, Programmatic Care        Reviewed case and recommendations with attending provider. Attending Name:         Attending concurs with disposition: yes       Patient and/or validated legal guardian concurs with disposition:   no       Final disposition:  discharge             Legal status: Voluntary/Patient has signed consent for treatment                                                                                                                                 Reviewed court records: yes       Assessment Details   Total duration spent with the patient: 10 min     CPT code(s) utilized: Non-Billable    ELIZABETH Mcclure, Psychotherapist  DEC - Triage & Transition Services  Callback: [phone]    ELIZABETH Mcclure on 1/22/2025 at 10:38 PM

## 2025-01-23 NOTE — ED PROVIDER NOTES
Signout note    Signout taken from Dr. Adame.  Please see his note for details.  In brief, this patient has a long history of alcohol abuse.  He is plugged into many different outpatient treatment programs.  Had missed his outpatient treatment program the last 2 days so there was a welfare check and the patient was found passed out with many alcohol bottles around him. Blood alcohol level here was 0.42.  Patient was medically cleared, but needs a DEC assessment because he was making suicidal statements when intoxicated.  Patient is denying suicidal ideation here.  Was vomiting and was given his Suboxone, possible that there may have been a component of opiate withdrawal.  When I reassessed him, no longer vomiting.  He met with the DEC  call, and I spoke with the DEC .  Patient is denying suicidal ideation.  They spoke with the patient's mom and this is consistent with previous episodes.  They are recommending discharge and I agree with that recommendation.  Patient offered additional resources and detox and declined.  I encouraged him to go to his outpatient treatment program.  Encouraged to return if he has thoughts of self-harm or any other new or concerning symptoms.    MD yL Ellington Sean, MD  01/22/25 [id number]

## 2025-03-07 DIAGNOSIS — F32.1 CURRENT MODERATE EPISODE OF MAJOR DEPRESSIVE DISORDER, UNSPECIFIED WHETHER RECURRENT (H): ICD-10-CM

## 2025-03-07 DIAGNOSIS — F41.1 GAD (GENERALIZED ANXIETY DISORDER): ICD-10-CM

## 2025-03-07 DIAGNOSIS — F11.20 OPIOID USE DISORDER, SEVERE, DEPENDENCE (H): ICD-10-CM

## 2025-03-07 NOTE — TELEPHONE ENCOUNTER
Pt calling again.  Wondering if Zoloft can be filled today.  Has been out of it.      Jillian Liu on 3/7/2025 at 4:37 PM

## 2025-03-07 NOTE — TELEPHONE ENCOUNTER
Medication Question or Refill        What medication are you calling about (include dose and sig)?: Pending Prescriptions:                       Disp   Refills    Buprenorphine HCl-Naloxone HCl (SUBOXONE)*60 Film0            Sig: Place 1 Film under the tongue 2 times daily.    gabapentin (NEURONTIN) 300 MG capsule     270 ca*3            Sig: Take 3-4 capsules (900-1,200 mg) by mouth at           bedtime.    QUEtiapine (SEROQUEL) 50 MG tablet        90 tab*1            Sig: Take 1 tablet (50 mg) by mouth 2 times daily.    sertraline (ZOLOFT) 100 MG tablet         90 tab*3            Sig: Take 1 tablet (100 mg) by mouth daily.        Preferred Pharmacy:   [facility] [address] [place] [address] [address] [place] [address]  [address] [facility] [address]  Phone: [phone] Fax: [phone]      Controlled Substance Agreement on file:   CSA -- Patient Level:    CSA: None found at the patient level.       Who prescribed the medication?: Hiram Horner PA-C     Do you need a refill? Yes    Patient offered an appointment? No    Do you have any questions or concerns?  Yes: It looks like pt has gabapentin refills on file but he says the pharmacy told him he does not have any refills available.      Could we send this information to you in [facility] or would you prefer to receive a phone call?:   Patient would prefer a phone call   Okay to leave a detailed message?: Yes at Home number on file [phone] (home)    Connie Nicole on 3/7/2025 at 3:23 PM

## 2025-03-10 RX ORDER — QUETIAPINE FUMARATE 50 MG/1
50 TABLET, FILM COATED ORAL 2 TIMES DAILY
Qty: 90 TABLET | Refills: 1 | Status: SHIPPED | OUTPATIENT
Start: 2025-03-10

## 2025-03-10 RX ORDER — SERTRALINE HYDROCHLORIDE 100 MG/1
100 TABLET, FILM COATED ORAL DAILY
Qty: 90 TABLET | Refills: 3 | OUTPATIENT
Start: 2025-03-10

## 2025-03-10 RX ORDER — GABAPENTIN 300 MG/1
900-1200 CAPSULE ORAL AT BEDTIME
Qty: 270 CAPSULE | Refills: 3 | OUTPATIENT
Start: 2025-03-10

## 2025-03-10 RX ORDER — BUPRENORPHINE AND NALOXONE 12; 3 MG/1; MG/1
1 FILM, SOLUBLE BUCCAL; SUBLINGUAL 2 TIMES DAILY
Qty: 60 FILM | Refills: 0 | Status: SHIPPED | OUTPATIENT
Start: 2025-03-10

## 2025-03-10 NOTE — TELEPHONE ENCOUNTER
Spoke with pharmacy; pt does have refills for both gabapentin and sertraline available. Pharmacy says that they'll get them ready for the pt to . Routing remaining refill requests to PCP for review.    Requested Prescriptions   Pending Prescriptions Disp Refills    Buprenorphine HCl-Naloxone HCl (SUBOXONE) 12-3 MG FILM per film 60 Film 0     Sig: Place 1 Film under the tongue 2 times daily.       There is no refill protocol information for this order       QUEtiapine (SEROQUEL) 50 MG tablet 90 tablet 1     Sig: Take 1 tablet (50 mg) by mouth 2 times daily.       Antipsychotic Medications Failed - 3/10/2025  9:34 AM        Failed - Lipid panel on file within the past 12 months     No lab results found.            Failed - PHQ9 score less than 5 in past 6 months     Please review last PHQ-9 score.           Failed - EMILY-7 score of less than 5 in past 6 months.     Please review last EMILY-7 score.           Passed - Most recent blood pressure under 140/90 in past 12 months        Passed - Patient is 12 years of age or older        Passed - Heart Rate on file within past 12 months     Pulse Readings from Last 3 Encounters:   01/29/25 68   01/22/25 84   01/09/25 73               Passed - A1c or Glucose on file in past 12 months     Recent Labs   Lab Test 01/22/25  1306 12/05/24  1450 12/05/24  1345   *   < >  --    A1C  --   --  5.6    < > = values in this interval not displayed.       Please review patients last 3 weights. If a weight gain of >10 lbs exists, you may refill the prescription once after instructing the patient to schedule an appointment within the next 30 days.    Wt Readings from Last 3 Encounters:   01/29/25 108.4 kg (239 lb)   01/09/25 107.5 kg (237 lb)   09/24/24 100.1 kg (220 lb 9.6 oz)             Passed - Medication is active on med list and the sig matches. RN to manually verify dose and sig if red X/fail.     If the protocol passes (green check), you do not need to verify med dose and  sig.    A prescription matches if they are the same clinical intention.    For Example: once daily and every morning are the same.    The protocol can not identify upper and lower case letters as matching and will fail.     For Example: Take 1 tablet (50 mg) by mouth daily     TAKE 1 TABLET (50 MG) BY MOUTH DAILY    For all fails (red x), verify dose and sig.    If the refill does match what is on file, the RN can still proceed to approve the refill request.       If they do not match, route to the appropriate provider.             Passed - Recent (12 month) or future (90 days) visit with authorizing provider s specialty     The patient must have completed an in-person or virtual visit within the past 12 months or has a future visit scheduled within the next 90 days with the authorizing provider s specialty.  Urgent care and e-visits do not qualify as an office visit for this protocol.          Passed - Medication indicated for associated diagnosis     Medication is associated with one or more of the following diagnoses:             Agitation            Autistic disorder            Bipolar disorder            Chemotherapy-induced nausea and vomiting            Delirium            Depression            Schizophrenia             Mood disorder             Refused Prescriptions Disp Refills    gabapentin (NEURONTIN) 300 MG capsule 270 capsule 3     Sig: Take 3-4 capsules (900-1,200 mg) by mouth at bedtime.       There is no refill protocol information for this order       sertraline (ZOLOFT) 100 MG tablet 90 tablet 3     Sig: Take 1 tablet (100 mg) by mouth daily.       SSRIs Protocol Failed - 3/10/2025  9:34 AM        Failed - PHQ-9 score less than 5 in past 6 months     Please review last PHQ-9 score.           Failed - EMILY-7 score of less than 5 in past 6 months.     Please review last EMILY-7 score.           Passed - Medication is active on med list and the sig matches. RN to manually verify dose and sig if red  X/fail.     If the protocol passes (green check), you do not need to verify med dose and sig.    A prescription matches if they are the same clinical intention.    For Example: once daily and every morning are the same.    The protocol can not identify upper and lower case letters as matching and will fail.     For Example: Take 1 tablet (50 mg) by mouth daily     TAKE 1 TABLET (50 MG) BY MOUTH DAILY    For all fails (red x), verify dose and sig.    If the refill does match what is on file, the RN can still proceed to approve the refill request.       If they do not match, route to the appropriate provider.             Passed - Recent (12 mo) or future (90 days) visit within the authorizing provider's specialty     The patient must have completed an in-person or virtual visit within the past 12 months or has a future visit scheduled within the next 90 days with the authorizing provider s specialty.  Urgent care and e-visits do not qualify as an office visit for this protocol.          Passed - Medication indicated for associated diagnosis     Medication is associated with one or more of the following diagnoses:              Anxiety             Bipolar  Depression  Obsessive-compulsive disorder             Panic disorder  Postmenopausal flushing             Premenstrual dysphoric disorder             Social phobia   Adjustment disorder with depressed mood   Mood disorder   Adjustment disorder with anxious mood          Passed - Patient is age 18 or older           Lela Orozco RN  [facility]

## 2025-04-07 ENCOUNTER — HOSPITAL ENCOUNTER (EMERGENCY)
Facility: CLINIC | Age: 30
Discharge: HOME | End: 2025-04-07
Attending: EMERGENCY MEDICINE | Admitting: EMERGENCY MEDICINE

## 2025-04-07 ENCOUNTER — E-VISIT (OUTPATIENT)
Dept: FAMILY MEDICINE | Facility: CLINIC | Age: 30
End: 2025-04-07

## 2025-04-07 VITALS
SYSTOLIC BLOOD PRESSURE: 157 MMHG | WEIGHT: 239 LBS | BODY MASS INDEX: 32.41 KG/M2 | OXYGEN SATURATION: 100 % | DIASTOLIC BLOOD PRESSURE: 108 MMHG | RESPIRATION RATE: 16 BRPM | HEART RATE: 68 BPM

## 2025-04-07 DIAGNOSIS — R44.3 HALLUCINATIONS: Primary | ICD-10-CM

## 2025-04-07 DIAGNOSIS — F10.90 ALCOHOL USE DISORDER: ICD-10-CM

## 2025-04-07 DIAGNOSIS — R44.3 HALLUCINATIONS: ICD-10-CM

## 2025-04-07 LAB
ALBUMIN SERPL BCG-MCNC: 4.8 G/DL (ref 3.5–5.2)
ALP SERPL-CCNC: 124 U/L (ref 40–150)
ALT SERPL W P-5'-P-CCNC: 152 U/L (ref 0–70)
ANION GAP SERPL CALCULATED.3IONS-SCNC: 15 MMOL/L (ref 7–15)
AST SERPL W P-5'-P-CCNC: 108 U/L (ref 0–45)
BASOPHILS # BLD AUTO: 0.1 10E3/UL (ref 0–0.2)
BASOPHILS NFR BLD AUTO: 1 %
BILIRUB SERPL-MCNC: 0.5 MG/DL
BUN SERPL-MCNC: 12.6 MG/DL (ref 6–20)
CALCIUM SERPL-MCNC: 9.8 MG/DL (ref 8.8–10.4)
CHLORIDE SERPL-SCNC: 98 MMOL/L (ref 98–107)
CREAT SERPL-MCNC: 0.88 MG/DL (ref 0.67–1.17)
EGFRCR SERPLBLD CKD-EPI 2021: >90 ML/MIN/1.73M2
EOSINOPHIL # BLD AUTO: 0.1 10E3/UL (ref 0–0.7)
EOSINOPHIL NFR BLD AUTO: 1 %
ERYTHROCYTE [DISTWIDTH] IN BLOOD BY AUTOMATED COUNT: 15.9 % (ref 10–15)
GLUCOSE SERPL-MCNC: 105 MG/DL (ref 70–99)
HCO3 SERPL-SCNC: 21 MMOL/L (ref 22–29)
HCT VFR BLD AUTO: 42 % (ref 40–53)
HGB BLD-MCNC: 14.2 G/DL (ref 13.3–17.7)
IMM GRANULOCYTES # BLD: 0 10E3/UL
IMM GRANULOCYTES NFR BLD: 0 %
LYMPHOCYTES # BLD AUTO: 1.5 10E3/UL (ref 0.8–5.3)
LYMPHOCYTES NFR BLD AUTO: 20 %
MCH RBC QN AUTO: 29.4 PG (ref 26.5–33)
MCHC RBC AUTO-ENTMCNC: 33.8 G/DL (ref 31.5–36.5)
MCV RBC AUTO: 87 FL (ref 78–100)
MONOCYTES # BLD AUTO: 0.9 10E3/UL (ref 0–1.3)
MONOCYTES NFR BLD AUTO: 12 %
NEUTROPHILS # BLD AUTO: 4.8 10E3/UL (ref 1.6–8.3)
NEUTROPHILS NFR BLD AUTO: 66 %
NRBC # BLD AUTO: 0 10E3/UL
NRBC BLD AUTO-RTO: 0 /100
PLATELET # BLD AUTO: 187 10E3/UL (ref 150–450)
POTASSIUM SERPL-SCNC: 4.3 MMOL/L (ref 3.4–5.3)
PROT SERPL-MCNC: 8 G/DL (ref 6.4–8.3)
RBC # BLD AUTO: 4.83 10E6/UL (ref 4.4–5.9)
SODIUM SERPL-SCNC: 134 MMOL/L (ref 135–145)
WBC # BLD AUTO: 7.2 10E3/UL (ref 4–11)

## 2025-04-07 PROCEDURE — 99207 PR NON-BILLABLE SERV PER CHARTING: CPT | Performed by: PHYSICIAN ASSISTANT

## 2025-04-07 PROCEDURE — 250N000013 HC RX MED GY IP 250 OP 250 PS 637: Performed by: EMERGENCY MEDICINE

## 2025-04-07 PROCEDURE — 99284 EMERGENCY DEPT VISIT MOD MDM: CPT | Performed by: EMERGENCY MEDICINE

## 2025-04-07 PROCEDURE — 84075 ASSAY ALKALINE PHOSPHATASE: CPT | Performed by: EMERGENCY MEDICINE

## 2025-04-07 PROCEDURE — 99283 EMERGENCY DEPT VISIT LOW MDM: CPT | Performed by: EMERGENCY MEDICINE

## 2025-04-07 PROCEDURE — 84132 ASSAY OF SERUM POTASSIUM: CPT | Performed by: EMERGENCY MEDICINE

## 2025-04-07 PROCEDURE — 85004 AUTOMATED DIFF WBC COUNT: CPT | Performed by: EMERGENCY MEDICINE

## 2025-04-07 PROCEDURE — 36415 COLL VENOUS BLD VENIPUNCTURE: CPT | Performed by: EMERGENCY MEDICINE

## 2025-04-07 PROCEDURE — 85025 COMPLETE CBC W/AUTO DIFF WBC: CPT | Performed by: EMERGENCY MEDICINE

## 2025-04-07 RX ORDER — DIAZEPAM 5 MG/1
10 TABLET ORAL ONCE
Status: COMPLETED | OUTPATIENT
Start: 2025-04-07 | End: 2025-04-07

## 2025-04-07 RX ORDER — DIAZEPAM 5 MG/1
10 TABLET ORAL EVERY 6 HOURS PRN
Qty: 8 TABLET | Refills: 0 | Status: SHIPPED | OUTPATIENT
Start: 2025-04-07

## 2025-04-07 RX ADMIN — DIAZEPAM 10 MG: 5 TABLET ORAL at 16:43

## 2025-04-07 ASSESSMENT — ANXIETY QUESTIONNAIRES
7. FEELING AFRAID AS IF SOMETHING AWFUL MIGHT HAPPEN: MORE THAN HALF THE DAYS
GAD7 TOTAL SCORE: 18
3. WORRYING TOO MUCH ABOUT DIFFERENT THINGS: MORE THAN HALF THE DAYS
IF YOU CHECKED OFF ANY PROBLEMS ON THIS QUESTIONNAIRE, HOW DIFFICULT HAVE THESE PROBLEMS MADE IT FOR YOU TO DO YOUR WORK, TAKE CARE OF THINGS AT HOME, OR GET ALONG WITH OTHER PEOPLE: VERY DIFFICULT
6. BECOMING EASILY ANNOYED OR IRRITABLE: NEARLY EVERY DAY
5. BEING SO RESTLESS THAT IT IS HARD TO SIT STILL: MORE THAN HALF THE DAYS
8. IF YOU CHECKED OFF ANY PROBLEMS, HOW DIFFICULT HAVE THESE MADE IT FOR YOU TO DO YOUR WORK, TAKE CARE OF THINGS AT HOME, OR GET ALONG WITH OTHER PEOPLE?: VERY DIFFICULT
1. FEELING NERVOUS, ANXIOUS, OR ON EDGE: NEARLY EVERY DAY
2. NOT BEING ABLE TO STOP OR CONTROL WORRYING: NEARLY EVERY DAY
7. FEELING AFRAID AS IF SOMETHING AWFUL MIGHT HAPPEN: MORE THAN HALF THE DAYS
4. TROUBLE RELAXING: NEARLY EVERY DAY

## 2025-04-07 ASSESSMENT — PATIENT HEALTH QUESTIONNAIRE - PHQ9
10. IF YOU CHECKED OFF ANY PROBLEMS, HOW DIFFICULT HAVE THESE PROBLEMS MADE IT FOR YOU TO DO YOUR WORK, TAKE CARE OF THINGS AT HOME, OR GET ALONG WITH OTHER PEOPLE: EXTREMELY DIFFICULT
SUM OF ALL RESPONSES TO PHQ QUESTIONS 1-9: 23
SUM OF ALL RESPONSES TO PHQ QUESTIONS 1-9: 23

## 2025-04-07 ASSESSMENT — COLUMBIA-SUICIDE SEVERITY RATING SCALE - C-SSRS
1. IN THE PAST MONTH, HAVE YOU WISHED YOU WERE DEAD OR WISHED YOU COULD GO TO SLEEP AND NOT WAKE UP?: NO
6. HAVE YOU EVER DONE ANYTHING, STARTED TO DO ANYTHING, OR PREPARED TO DO ANYTHING TO END YOUR LIFE?: NO
2. HAVE YOU ACTUALLY HAD ANY THOUGHTS OF KILLING YOURSELF IN THE PAST MONTH?: NO

## 2025-04-07 ASSESSMENT — ACTIVITIES OF DAILY LIVING (ADL)
ADLS_ACUITY_SCORE: 41
ADLS_ACUITY_SCORE: 41

## 2025-04-07 NOTE — ED TRIAGE NOTES
Visual and auditory hallucinations over the past 5-7 days.  Patient had stopped taking his Zoloft and Seroquel abruptly because he was drinking alcohol and had lost his insurance.  Patient started taking medication again approximately 7 days ago. Patient spoke with his psychiatrist and was told to go to the ER.  Patient denies suicidal ideation.     Triage Assessment (Adult)       Row Name 04/07/25 1529          Triage Assessment    Airway WDL WDL        Respiratory WDL    Respiratory WDL WDL        Skin Circulation/Temperature WDL    Skin Circulation/Temperature WDL WDL        Cardiac WDL    Cardiac WDL WDL        Peripheral/Neurovascular WDL    Peripheral Neurovascular WDL WDL        Cognitive/Neuro/Behavioral WDL    Cognitive/Neuro/Behavioral WDL WDL

## 2025-04-07 NOTE — TELEPHONE ENCOUNTER
Spoke with patient. He reports that he is not currently hallucinating, but has been seeing people that are not there at night while laying in the dark. Patient denies hearing voices or having thoughts of hurting himself or others. Advised to present to the ER for further evaluation. Patient again states he is not currently having symptoms. Advised he should still be evaluated by provider on more urgent level due to recurrently nightly hallucinations. Patient reluctantly verbalizes understanding.    Mary Kay Taylor RN on 4/7/2025 at 11:57 AM

## 2025-04-07 NOTE — TELEPHONE ENCOUNTER
Team, please call patient and get him to go to the ER or call 911. If you do not get a hold of him, we will have to call for a welfare check.     Hiram Horner PA-C     Provider E-Visit time total (minutes): Referred to in person/virtual visit.  Less than 5 minutes.

## 2025-04-07 NOTE — DISCHARGE INSTRUCTIONS
I believe your symptoms are likely related to alcohol withdrawal.  There is no sign of a dangerous cause at this time.  I believe that we will likely get better over the next several days.  You could try using the diazepam to help with the hallucinations.  Return if symptoms are getting worse or if other concerns.  Get rechecked if not feeling better in the next several days.

## 2025-04-07 NOTE — ED PROVIDER NOTES
History     Chief Complaint   Patient presents with    Hallucinations     HPI  Hemanth Hurd is a 29 year old male with a history of alcohol use disorder who presents for evaluation of hallucinations.  The patient says that 7 weeks ago he stopped drinking alcohol and morning to get sober.  He feels as if he has gone through his significant withdrawal symptoms but is still feeling somewhat off.  However over the last several days he has been seeing and hearing things that he knows are not real.  They are scary to him, for instance 1 was a person telling him to get out of the house because it was not safe.  They are not commanding him to hurt himself or others.  He is not worried about suicidal ideation or homicidal ideation.  He wants to stay off alcohol.  No nausea or vomiting.  No difficulty breathing.  Denies fevers, headache, vision changes.  The patient was off of his sertraline and quetiapine for about 10 days and restarted these 1 week ago, he is not sure if maybe this is contributing to his symptoms.    Allergies:  No Known Allergies    Problem List:    Patient Active Problem List    Diagnosis Date Noted    Alcohol use disorder, severe, in early remission (H) 01/09/2025     Priority: Medium    Opioid use disorder, severe, dependence (H) 07/10/2024     Priority: Medium    EMILY (generalized anxiety disorder) 07/10/2024     Priority: Medium    Current moderate episode of major depressive disorder, unspecified whether recurrent (H) 07/10/2024     Priority: Medium    Drug-induced insomnia (H) 07/10/2024     Priority: Medium        Past Medical History:    Past Medical History:   Diagnosis Date    History of alcohol abuse     History of benzodiazepine use     History of opiate therapy        Past Surgical History:    No past surgical history on file.    Family History:    Family History   Problem Relation Age of Onset    Rheumatoid Arthritis Mother     Mental Illness Father        Social History:  Marital  Status:  Single [1]  Social History     Tobacco Use    Smoking status: Former     Types: Cigarettes    Smokeless tobacco: Former     Types: Chew     Quit date: 12/2021    Tobacco comments:     Only when unable to vape   Vaping Use    Vaping status: Every Day    Substances: Nicotine, Flavoring    Devices: Refillable tank   Substance Use Topics    Alcohol use: Not Currently     Comment: hx of abuse    Drug use: Not Currently     Types: Benzodiazepines, Opiates        Medications:    diazepam (VALIUM) 5 MG tablet  acamprosate (CAMPRAL) 333 MG EC tablet  Buprenorphine HCl-Naloxone HCl (SUBOXONE) 12-3 MG FILM per film  cloNIDine (CATAPRES) 0.1 MG tablet  gabapentin (NEURONTIN) 300 MG capsule  modafinil (PROVIGIL) 100 MG tablet  naloxone (NARCAN) 4 MG/0.1ML nasal spray  QUEtiapine (SEROQUEL) 50 MG tablet  sertraline (ZOLOFT) 100 MG tablet          Review of Systems    Physical Exam   BP: (!) 157/108  Pulse: 68  Resp: 16  Weight: 108.4 kg (239 lb)  SpO2: 100 %      Physical Exam  Constitutional:       General: He is in acute distress.      Appearance: He is well-developed. He is not diaphoretic.   HENT:      Head: Normocephalic and atraumatic.   Eyes:      Extraocular Movements: Extraocular movements intact.   Cardiovascular:      Rate and Rhythm: Normal rate.   Pulmonary:      Effort: No respiratory distress.      Breath sounds: No stridor.   Skin:     General: Skin is warm and dry.   Neurological:      Mental Status: He is alert.      Cranial Nerves: Cranial nerves 2-12 are intact.      Motor: Tremor present.      Gait: Gait normal.         ED Course        Procedures              Critical Care time:  none     None         Results for orders placed or performed during the hospital encounter of 04/07/25 (from the past 24 hours)   CBC with Platelets & Differential    Narrative    The following orders were created for panel order CBC with Platelets & Differential.  Procedure                               Abnormality          Status                     ---------                               -----------         ------                     CBC with platelets and ...[[id number]]  Abnormal            Final result                 Please view results for these tests on the individual orders.   Comprehensive metabolic panel   Result Value Ref Range    Sodium 134 (L) 135 - 145 mmol/L    Potassium 4.3 3.4 - 5.3 mmol/L    Carbon Dioxide (CO2) 21 (L) 22 - 29 mmol/L    Anion Gap 15 7 - 15 mmol/L    Urea Nitrogen 12.6 6.0 - 20.0 mg/dL    Creatinine 0.88 0.67 - 1.17 mg/dL    GFR Estimate >90 >60 mL/min/1.73m2    Calcium 9.8 8.8 - 10.4 mg/dL    Chloride 98 98 - 107 mmol/L    Glucose 105 (H) 70 - 99 mg/dL    Alkaline Phosphatase 124 40 - 150 U/L     (H) 0 - 45 U/L     (H) 0 - 70 U/L    Protein Total 8.0 6.4 - 8.3 g/dL    Albumin 4.8 3.5 - 5.2 g/dL    Bilirubin Total 0.5 <=1.2 mg/dL   CBC with platelets and differential   Result Value Ref Range    WBC Count 7.2 4.0 - 11.0 10e3/uL    RBC Count 4.83 4.40 - 5.90 10e6/uL    Hemoglobin 14.2 13.3 - 17.7 g/dL    Hematocrit 42.0 40.0 - 53.0 %    MCV 87 78 - 100 fL    MCH 29.4 26.5 - 33.0 pg    MCHC 33.8 31.5 - 36.5 g/dL    RDW 15.9 (H) 10.0 - 15.0 %    Platelet Count 187 150 - 450 10e3/uL    % Neutrophils 66 %    % Lymphocytes 20 %    % Monocytes 12 %    % Eosinophils 1 %    % Basophils 1 %    % Immature Granulocytes 0 %    NRBCs per 100 WBC 0 <1 /100    Absolute Neutrophils 4.8 1.6 - 8.3 10e3/uL    Absolute Lymphocytes 1.5 0.8 - 5.3 10e3/uL    Absolute Monocytes 0.9 0.0 - 1.3 10e3/uL    Absolute Eosinophils 0.1 0.0 - 0.7 10e3/uL    Absolute Basophils 0.1 0.0 - 0.2 10e3/uL    Absolute Immature Granulocytes 0.0 <=0.4 10e3/uL    Absolute NRBCs 0.0 10e3/uL       Medications   diazepam (VALIUM) tablet 10 mg (10 mg Oral $Given 4/7/25 6537)       Assessments & Plan (with Medical Decision Making)   29-year-old male with history of alcohol use disorder who presents for evaluation of hallucinations.  He is  nontoxic on exam.  He is still slightly tremulous.  He is given a dose of oral diazepam.  Electrolytes are within normal limits with the exception of very mildly low sodium and bicarbonate, not likely increased contributing to his symptoms.  He has mild elevations of AST and ALT consistent with his alcohol use.  White blood cell count is 7.2 and his hemoglobin is 14.2.  On recheck the patient says that he is comfortable.  He is still not having any of the hallucinations currently.  I believe this is likely related to his alcohol use disorder and alcohol drawl causing the symptoms.  He is not currently having the symptoms, I offered him detox but he declined.  At this time I believe he is safe to discharge with instructions to return if he has worse of his symptoms or concerns will give him a short course of diazepam to help with the symptoms and I told him that I think this will likely improve over the next several days.  He told to return if worse, otherwise follow-up in clinic.  The patient is in agreement with this plan.    I have reviewed the nursing notes.    I have reviewed the findings, diagnosis, plan and need for follow up with the patient.           Medical Decision Making  The patient's presentation was of high complexity (a chronic illness severe exacerbation, progression, or side effect of treatment).    The patient's evaluation involved:  ordering and/or review of 2 test(s) in this encounter (see separate area of note for details)    The patient's management necessitated moderate risk (prescription drug management including medications given in the ED).        Discharge Medication List as of 4/7/2025  5:28 PM        START taking these medications    Details   diazepam (VALIUM) 5 MG tablet Take 2 tablets (10 mg) by mouth every 6 hours as needed for anxiety., Disp-8 tablet, R-0, E-Prescribe             Final diagnoses:   Alcohol use disorder   Hallucinations       4/7/2025   [facility]  EMERGENCY DEPT       David Tilley MD  04/07/25 1138

## 2025-05-08 ENCOUNTER — VIRTUAL VISIT (OUTPATIENT)
Dept: FAMILY MEDICINE | Facility: CLINIC | Age: 30
End: 2025-05-08

## 2025-05-08 DIAGNOSIS — F11.20 OPIOID USE DISORDER, SEVERE, DEPENDENCE (H): ICD-10-CM

## 2025-05-08 DIAGNOSIS — F10.20 ALCOHOL USE DISORDER, SEVERE, DEPENDENCE (H): Primary | ICD-10-CM

## 2025-05-08 RX ORDER — BUPRENORPHINE AND NALOXONE 12; 3 MG/1; MG/1
1 FILM, SOLUBLE BUCCAL; SUBLINGUAL 2 TIMES DAILY
Qty: 60 FILM | Refills: 1 | Status: SHIPPED | OUTPATIENT
Start: 2025-05-08

## 2025-05-08 ASSESSMENT — ANXIETY QUESTIONNAIRES
7. FEELING AFRAID AS IF SOMETHING AWFUL MIGHT HAPPEN: SEVERAL DAYS
2. NOT BEING ABLE TO STOP OR CONTROL WORRYING: SEVERAL DAYS
5. BEING SO RESTLESS THAT IT IS HARD TO SIT STILL: SEVERAL DAYS
3. WORRYING TOO MUCH ABOUT DIFFERENT THINGS: SEVERAL DAYS
6. BECOMING EASILY ANNOYED OR IRRITABLE: MORE THAN HALF THE DAYS
IF YOU CHECKED OFF ANY PROBLEMS ON THIS QUESTIONNAIRE, HOW DIFFICULT HAVE THESE PROBLEMS MADE IT FOR YOU TO DO YOUR WORK, TAKE CARE OF THINGS AT HOME, OR GET ALONG WITH OTHER PEOPLE: SOMEWHAT DIFFICULT

## 2025-05-08 ASSESSMENT — PATIENT HEALTH QUESTIONNAIRE - PHQ9
SUM OF ALL RESPONSES TO PHQ QUESTIONS 1-9: 9
5. POOR APPETITE OR OVEREATING: SEVERAL DAYS

## 2025-05-08 NOTE — PROGRESS NOTES
Hemanth is a 29 year old who is being evaluated via a billable video visit.    How would you like to obtain your AVS? MyChart  If the video visit is dropped, the invitation should be resent by: Text to cell phone: [phone]  Will anyone else be joining your video visit? No      Assessment & Plan   Opioid use disorder, severe, dependence (H)  From an opioid use disorder standpoint Hemanth is stable.  He is stable on 12 mg of Suboxone twice daily.   reassuring.  Unfortunately he has relapsed in regards to his alcohol use disorder.  Although the combination of alcohol and Suboxone can cause significant disability including death I think given his history it is still a reasonable approach to reduce his harm overall.  Patient verbalized understanding that he should stop/cut down his alcohol use significantly and that the combination of alcohol and Suboxone can increase his risk of permanent disability or death. See below for treatment of his alcohol use disorder.  - Buprenorphine HCl-Naloxone HCl (SUBOXONE) 12-3 MG FILM per film; Place 1 Film under the tongue 2 times daily.    Alcohol use disorder, severe, dependence (H)  Relapse several days ago.  He has had several ER visits recently due to acute alcohol intoxication and/or withdrawal.  We have attempted to treat his withdrawal outpatient with some success but we have not been able to find a stable regimen or treatment plan for him in the outpatient setting that has been able to consistently treat his alcohol use disorder.  I have recommended a more intensive program such as an [facility], residential program but the patient is not interested in this.  He will monitor symptoms closely and try to cut back with his current medication regimen and he will follow-up with me as needed based on symptoms.    The longitudinal plan of care for the diagnosis(es)/condition(s) as documented were addressed during this visit. Due to the added complexity in care, I will continue to  support Hemanth in the subsequent management and with ongoing continuity of care.     BMI  Estimated body mass index is 32.41 kg/m  as calculated from the following:    Height as of 1/29/25: 1.829 m (6').    Weight as of 4/7/25: 108.4 kg (239 lb).     Follow-up    Follow-up Visit   Expected date:  Jun 08, 2025 (Approximate)      Follow Up Appointment Details:     Follow-up with whom?: Me    Follow-Up for what?: Chronic Disease f/u    Chronic Disease f/u: General (Other)    Additional Details: Opioid Use Disorder    How?: In Person    Is this an as-needed follow-up?: No                 Subjective   Hemanth is a 29 year old, presenting for the following health issues:  No chief complaint on file.        5/8/2025     8:08 AM   Additional Questions   Roomed by Keli CATALAN CMA   Accompanied by Self     Video Start Time: 8:38 AM    HPI       He is currently taking 12-3 mg of buprenorphine daily. This is taken in 2 dose(s) daily.     Status Since Last Visit:  Have you used any opioids since your last visit?: no use since last visit  Do you feel that your dose of suboxone is too high or too low? Adequate  Have there been cravings for opioids? Yes: mild    Any withdrawal symptoms? none    Any side effects from the medication? none  Any alcohol use? Yes-yesterday 3/4 of a liter   Any other recreational drug use? no    Precipitating Factors:  Triggers have been: mild   Other Supports:  Do you attend counseling or meet with a therapist? No  Do you attend NA or AA meetings? No  Do you have/meet with a sponsor? No  Family and support systems have been: helpful and supportive   What other goals have you been working on (job, family, relationships, etc)? Starting a new job     PDMP Review         Value Time User    State PDMP site checked  Yes 3/10/2025  9:42 AM Hiram Horner PA-C          Depression and Anxiety   How are you doing with your depression since your last visit? No change  How are you doing with your anxiety since your  last visit?  No change  Are you having other symptoms that might be associated with depression or anxiety? No  Have you had a significant life event? No   Do you have any concerns with your use of alcohol or other drugs? Yes:  Alcohol use     Social History     Tobacco Use    Smoking status: Former     Types: Cigarettes    Smokeless tobacco: Former     Types: Chew     Quit date: 12/2021    Tobacco comments:     Only when unable to vape   Vaping Use    Vaping status: Every Day    Substances: Nicotine, Flavoring    Devices: Refillable tank   Substance Use Topics    Alcohol use: Not Currently     Comment: hx of abuse    Drug use: Not Currently     Types: Benzodiazepines, Opiates         1/9/2025     9:54 AM 4/7/2025    11:36 AM 5/8/2025     8:11 AM   PHQ   PHQ-9 Total Score 14  23  9   Q9: Thoughts of better off dead/self-harm past 2 weeks Several days Several days Not at all   F/U: Thoughts of suicide or self-harm No No    F/U: Safety concerns No No        Patient-reported         4/26/2024     4:07 PM 9/10/2024     8:38 AM 4/7/2025    11:36 AM   EMILY-7 SCORE   Total Score   18 (severe anxiety)   Total Score 8 17 18        Patient-reported         5/8/2025     8:11 AM   Last PHQ-9   1.  Little interest or pleasure in doing things 1   2.  Feeling down, depressed, or hopeless 1   3.  Trouble falling or staying asleep, or sleeping too much 2   4.  Feeling tired or having little energy 2   5.  Poor appetite or overeating 1   6.  Feeling bad about yourself 1   7.  Trouble concentrating 1   8.  Moving slowly or restless 0   Q9: Thoughts of better off dead/self-harm past 2 weeks 0   PHQ-9 Total Score 9   Difficulty at work, home, or with people Somewhat difficult         5/8/2025     8:11 AM   EMILY-7    2. Not being able to stop or control worrying 1   3. Worrying too much about different things 1   4. Trouble relaxing 1   5. Being so restless that it is hard to sit still 1   6. Becoming easily annoyed or irritable 2   7.  Feeling afraid, as if something awful might happen 1   If you checked any problems, how difficult have they made it for you to do your work, take care of things at home, or get along with other people? Somewhat difficult       Suicide Assessment Five-step Evaluation and Treatment (SAFE-T)    ROS:  See HPI       Objective           Vitals:  No vitals were obtained today due to virtual visit.    Physical Exam   GENERAL: alert and no distress  EYES: Eyes grossly normal to inspection.  No discharge or erythema, or obvious scleral/conjunctival abnormalities.  RESP: No audible wheeze, cough, or visible cyanosis.    SKIN: Visible skin clear. No significant rash, abnormal pigmentation or lesions.  NEURO: Cranial nerves grossly intact.  Mentation and speech appropriate for age.  PSYCH: Appropriate affect, tone, and pace of words        Video-Visit Details    Type of service:  Video Visit   Video End Time:8:42 AM  Originating Location (pt. Location): Home    Distant Location (provider location):  On-site  Platform used for Video Visit: Regina  Signed Electronically by: Hiram Horner PA-C

## 2025-05-08 NOTE — PATIENT INSTRUCTIONS
Continue current medicines.     I really recommend a treatment program for alcohol use. If you need resources for this, let me know.     Follow-up with me as needed.

## 2025-05-12 ENCOUNTER — PATIENT OUTREACH (OUTPATIENT)
Dept: CARE COORDINATION | Facility: CLINIC | Age: 30
End: 2025-05-12

## 2025-05-27 DIAGNOSIS — F11.20 OPIOID USE DISORDER, SEVERE, DEPENDENCE (H): ICD-10-CM

## 2025-05-27 RX ORDER — BUPRENORPHINE AND NALOXONE 12; 3 MG/1; MG/1
1 FILM, SOLUBLE BUCCAL; SUBLINGUAL 2 TIMES DAILY
Qty: 60 FILM | Refills: 1 | Status: SHIPPED | OUTPATIENT
Start: 2025-05-27

## 2025-06-06 ENCOUNTER — HOSPITAL ENCOUNTER (INPATIENT)
Facility: CLINIC | Age: 30
LOS: 2 days | Discharge: HOME | End: 2025-06-09
Attending: EMERGENCY MEDICINE | Admitting: PSYCHIATRY & NEUROLOGY
Payer: MEDICAID

## 2025-06-06 DIAGNOSIS — F10.220 ALCOHOL DEPENDENCE WITH UNCOMPLICATED INTOXICATION (H): ICD-10-CM

## 2025-06-06 DIAGNOSIS — F10.90 ALCOHOL USE DISORDER: Primary | ICD-10-CM

## 2025-06-06 DIAGNOSIS — S80.812A ABRASION OF LEFT LOWER LEG, INITIAL ENCOUNTER: ICD-10-CM

## 2025-06-06 DIAGNOSIS — R00.0 TACHYCARDIA, UNSPECIFIED: ICD-10-CM

## 2025-06-06 DIAGNOSIS — F11.20 OPIOID USE DISORDER, SEVERE, DEPENDENCE (H): ICD-10-CM

## 2025-06-06 LAB
ALBUMIN SERPL BCG-MCNC: 4.6 G/DL (ref 3.5–5.2)
ALCOHOL BREATH TEST: 0.26 (ref 0–0.01)
ALP SERPL-CCNC: ABNORMAL U/L
ALT SERPL W P-5'-P-CCNC: ABNORMAL U/L
ANION GAP SERPL CALCULATED.3IONS-SCNC: 23 MMOL/L (ref 7–15)
AST SERPL W P-5'-P-CCNC: ABNORMAL U/L
BASOPHILS # BLD AUTO: 0.1 10E3/UL (ref 0–0.2)
BASOPHILS NFR BLD AUTO: 1 %
BILIRUB SERPL-MCNC: 0.2 MG/DL
BUN SERPL-MCNC: 8.3 MG/DL (ref 6–20)
CALCIUM SERPL-MCNC: 9.2 MG/DL (ref 8.8–10.4)
CHLORIDE SERPL-SCNC: 99 MMOL/L (ref 98–107)
CREAT SERPL-MCNC: 0.74 MG/DL (ref 0.67–1.17)
EGFRCR SERPLBLD CKD-EPI 2021: >90 ML/MIN/1.73M2
EOSINOPHIL # BLD AUTO: 0 10E3/UL (ref 0–0.7)
EOSINOPHIL NFR BLD AUTO: 0 %
ERYTHROCYTE [DISTWIDTH] IN BLOOD BY AUTOMATED COUNT: 14 % (ref 10–15)
GLUCOSE SERPL-MCNC: 115 MG/DL (ref 70–99)
HCO3 SERPL-SCNC: 18 MMOL/L (ref 22–29)
HCT VFR BLD AUTO: 44.6 % (ref 40–53)
HGB BLD-MCNC: 15.2 G/DL (ref 13.3–17.7)
IMM GRANULOCYTES # BLD: 0 10E3/UL
IMM GRANULOCYTES NFR BLD: 0 %
LYMPHOCYTES # BLD AUTO: 2.7 10E3/UL (ref 0.8–5.3)
LYMPHOCYTES NFR BLD AUTO: 21 %
MAGNESIUM SERPL-MCNC: 1.9 MG/DL (ref 1.7–2.3)
MCH RBC QN AUTO: 29.6 PG (ref 26.5–33)
MCHC RBC AUTO-ENTMCNC: 34.1 G/DL (ref 31.5–36.5)
MCV RBC AUTO: 87 FL (ref 78–100)
MONOCYTES # BLD AUTO: 0.9 10E3/UL (ref 0–1.3)
MONOCYTES NFR BLD AUTO: 7 %
NEUTROPHILS # BLD AUTO: 8.8 10E3/UL (ref 1.6–8.3)
NEUTROPHILS NFR BLD AUTO: 71 %
NRBC # BLD AUTO: 0 10E3/UL
NRBC BLD AUTO-RTO: 0 /100
PLATELET # BLD AUTO: 283 10E3/UL (ref 150–450)
POTASSIUM SERPL-SCNC: ABNORMAL MMOL/L
PROT SERPL-MCNC: 8.3 G/DL (ref 6.4–8.3)
RBC # BLD AUTO: 5.13 10E6/UL (ref 4.4–5.9)
SODIUM SERPL-SCNC: 140 MMOL/L (ref 135–145)
WBC # BLD AUTO: 12.5 10E3/UL (ref 4–11)

## 2025-06-06 PROCEDURE — 258N000003 HC RX IP 258 OP 636: Performed by: EMERGENCY MEDICINE

## 2025-06-06 PROCEDURE — 93010 ELECTROCARDIOGRAM REPORT: CPT | Performed by: EMERGENCY MEDICINE

## 2025-06-06 PROCEDURE — 83735 ASSAY OF MAGNESIUM: CPT | Performed by: EMERGENCY MEDICINE

## 2025-06-06 PROCEDURE — 250N000013 HC RX MED GY IP 250 OP 250 PS 637: Performed by: EMERGENCY MEDICINE

## 2025-06-06 PROCEDURE — 99285 EMERGENCY DEPT VISIT HI MDM: CPT | Mod: 25 | Performed by: EMERGENCY MEDICINE

## 2025-06-06 PROCEDURE — 96360 HYDRATION IV INFUSION INIT: CPT | Performed by: EMERGENCY MEDICINE

## 2025-06-06 PROCEDURE — 250N000011 HC RX IP 250 OP 636: Performed by: EMERGENCY MEDICINE

## 2025-06-06 PROCEDURE — 99285 EMERGENCY DEPT VISIT HI MDM: CPT | Performed by: EMERGENCY MEDICINE

## 2025-06-06 PROCEDURE — 36415 COLL VENOUS BLD VENIPUNCTURE: CPT | Performed by: EMERGENCY MEDICINE

## 2025-06-06 PROCEDURE — 85025 COMPLETE CBC W/AUTO DIFF WBC: CPT | Performed by: EMERGENCY MEDICINE

## 2025-06-06 PROCEDURE — 84155 ASSAY OF PROTEIN SERUM: CPT | Performed by: EMERGENCY MEDICINE

## 2025-06-06 PROCEDURE — 93005 ELECTROCARDIOGRAM TRACING: CPT | Performed by: EMERGENCY MEDICINE

## 2025-06-06 PROCEDURE — 96361 HYDRATE IV INFUSION ADD-ON: CPT | Performed by: EMERGENCY MEDICINE

## 2025-06-06 RX ORDER — DIAZEPAM 5 MG/1
5-20 TABLET ORAL EVERY 30 MIN PRN
Status: DISCONTINUED | OUTPATIENT
Start: 2025-06-06 | End: 2025-06-09

## 2025-06-06 RX ORDER — FOLIC ACID 1 MG/1
1 TABLET ORAL DAILY
Status: DISCONTINUED | OUTPATIENT
Start: 2025-06-07 | End: 2025-06-07

## 2025-06-06 RX ORDER — MULTIPLE VITAMINS W/ MINERALS TAB 9MG-400MCG
1 TAB ORAL DAILY
Status: DISCONTINUED | OUTPATIENT
Start: 2025-06-07 | End: 2025-06-07

## 2025-06-06 RX ADMIN — SODIUM CHLORIDE 999 ML: 0.9 INJECTION, SOLUTION INTRAVENOUS at 22:02

## 2025-06-06 RX ADMIN — DIAZEPAM 10 MG: 5 TABLET ORAL at 22:30

## 2025-06-06 RX ADMIN — DEXTROSE AND SODIUM CHLORIDE 1000 ML: 5; .9 INJECTION, SOLUTION INTRAVENOUS at 23:52

## 2025-06-06 RX ADMIN — NICOTINE POLACRILEX 4 MG: 4 GUM, CHEWING BUCCAL at 23:10

## 2025-06-06 ASSESSMENT — COLUMBIA-SUICIDE SEVERITY RATING SCALE - C-SSRS
1. IN THE PAST MONTH, HAVE YOU WISHED YOU WERE DEAD OR WISHED YOU COULD GO TO SLEEP AND NOT WAKE UP?: NO
2. HAVE YOU ACTUALLY HAD ANY THOUGHTS OF KILLING YOURSELF IN THE PAST MONTH?: NO
6. HAVE YOU EVER DONE ANYTHING, STARTED TO DO ANYTHING, OR PREPARED TO DO ANYTHING TO END YOUR LIFE?: NO

## 2025-06-06 ASSESSMENT — ACTIVITIES OF DAILY LIVING (ADL)
ADLS_ACUITY_SCORE: 41
ADLS_ACUITY_SCORE: 41

## 2025-06-07 ENCOUNTER — TELEPHONE (OUTPATIENT)
Dept: BEHAVIORAL HEALTH | Facility: CLINIC | Age: 30
End: 2025-06-07
Payer: MEDICAID

## 2025-06-07 PROBLEM — F10.220 ALCOHOL DEPENDENCE WITH UNCOMPLICATED INTOXICATION (H): Status: ACTIVE | Noted: 2025-06-07

## 2025-06-07 LAB
ALP SERPL-CCNC: 85 U/L (ref 40–150)
ALT SERPL W P-5'-P-CCNC: 46 U/L (ref 0–70)
AMPHETAMINES UR QL SCN: ABNORMAL
AST SERPL W P-5'-P-CCNC: 43 U/L (ref 0–45)
BARBITURATES UR QL SCN: ABNORMAL
BENZODIAZ UR QL SCN: ABNORMAL
BZE UR QL SCN: ABNORMAL
CANNABINOIDS UR QL SCN: ABNORMAL
FENTANYL UR QL: ABNORMAL
GGT SERPL-CCNC: 75 U/L (ref 8–61)
OPIATES UR QL SCN: ABNORMAL
PCP QUAL URINE (ROCHE): ABNORMAL
POTASSIUM SERPL-SCNC: 3.9 MMOL/L (ref 3.4–5.3)
TSH SERPL DL<=0.005 MIU/L-ACNC: 1.59 UIU/ML (ref 0.3–4.2)

## 2025-06-07 PROCEDURE — 250N000013 HC RX MED GY IP 250 OP 250 PS 637: Performed by: REGISTERED NURSE

## 2025-06-07 PROCEDURE — 93005 ELECTROCARDIOGRAM TRACING: CPT | Performed by: EMERGENCY MEDICINE

## 2025-06-07 PROCEDURE — 84450 TRANSFERASE (AST) (SGOT): CPT | Performed by: EMERGENCY MEDICINE

## 2025-06-07 PROCEDURE — 82977 ASSAY OF GGT: CPT | Performed by: PSYCHIATRY & NEUROLOGY

## 2025-06-07 PROCEDURE — 96361 HYDRATE IV INFUSION ADD-ON: CPT | Performed by: EMERGENCY MEDICINE

## 2025-06-07 PROCEDURE — 84075 ASSAY ALKALINE PHOSPHATASE: CPT | Performed by: EMERGENCY MEDICINE

## 2025-06-07 PROCEDURE — 250N000012 HC RX MED GY IP 250 OP 636 PS 637: Performed by: REGISTERED NURSE

## 2025-06-07 PROCEDURE — 250N000013 HC RX MED GY IP 250 OP 250 PS 637: Performed by: PSYCHIATRY & NEUROLOGY

## 2025-06-07 PROCEDURE — 84443 ASSAY THYROID STIM HORMONE: CPT | Performed by: PSYCHIATRY & NEUROLOGY

## 2025-06-07 PROCEDURE — 250N000013 HC RX MED GY IP 250 OP 250 PS 637: Performed by: EMERGENCY MEDICINE

## 2025-06-07 PROCEDURE — 250N000011 HC RX IP 250 OP 636: Performed by: FAMILY MEDICINE

## 2025-06-07 PROCEDURE — 36415 COLL VENOUS BLD VENIPUNCTURE: CPT | Performed by: EMERGENCY MEDICINE

## 2025-06-07 PROCEDURE — 99223 1ST HOSP IP/OBS HIGH 75: CPT | Mod: AI | Performed by: REGISTERED NURSE

## 2025-06-07 PROCEDURE — HZ2ZZZZ DETOXIFICATION SERVICES FOR SUBSTANCE ABUSE TREATMENT: ICD-10-PCS | Performed by: PSYCHIATRY & NEUROLOGY

## 2025-06-07 PROCEDURE — 84460 ALANINE AMINO (ALT) (SGPT): CPT | Performed by: EMERGENCY MEDICINE

## 2025-06-07 PROCEDURE — 250N000011 HC RX IP 250 OP 636: Performed by: EMERGENCY MEDICINE

## 2025-06-07 PROCEDURE — 97150 GROUP THERAPEUTIC PROCEDURES: CPT | Mod: GO

## 2025-06-07 PROCEDURE — 128N000004 HC R&B CD ADULT

## 2025-06-07 PROCEDURE — 99254 IP/OBS CNSLTJ NEW/EST MOD 60: CPT | Performed by: PHYSICIAN ASSISTANT

## 2025-06-07 PROCEDURE — 250N000012 HC RX MED GY IP 250 OP 636 PS 637: Performed by: EMERGENCY MEDICINE

## 2025-06-07 PROCEDURE — 93010 ELECTROCARDIOGRAM REPORT: CPT | Performed by: EMERGENCY MEDICINE

## 2025-06-07 PROCEDURE — 80307 DRUG TEST PRSMV CHEM ANLYZR: CPT | Performed by: EMERGENCY MEDICINE

## 2025-06-07 PROCEDURE — 84132 ASSAY OF SERUM POTASSIUM: CPT | Performed by: EMERGENCY MEDICINE

## 2025-06-07 RX ORDER — BUPRENORPHINE AND NALOXONE 12; 3 MG/1; MG/1
1 FILM, SOLUBLE BUCCAL; SUBLINGUAL ONCE
Status: COMPLETED | OUTPATIENT
Start: 2025-06-07 | End: 2025-06-07

## 2025-06-07 RX ORDER — HYDRALAZINE HYDROCHLORIDE 25 MG/1
25 TABLET, FILM COATED ORAL EVERY 6 HOURS PRN
Status: DISCONTINUED | OUTPATIENT
Start: 2025-06-07 | End: 2025-06-09 | Stop reason: HOSPADM

## 2025-06-07 RX ORDER — HYDROXYZINE HYDROCHLORIDE 25 MG/1
25 TABLET, FILM COATED ORAL EVERY 4 HOURS PRN
Status: DISCONTINUED | OUTPATIENT
Start: 2025-06-07 | End: 2025-06-07

## 2025-06-07 RX ORDER — TRAZODONE HYDROCHLORIDE 50 MG/1
50 TABLET ORAL
Status: DISCONTINUED | OUTPATIENT
Start: 2025-06-07 | End: 2025-06-09 | Stop reason: HOSPADM

## 2025-06-07 RX ORDER — NALOXONE HYDROCHLORIDE 0.4 MG/ML
INJECTION, SOLUTION INTRAMUSCULAR; INTRAVENOUS; SUBCUTANEOUS
Status: DISCONTINUED
Start: 2025-06-07 | End: 2025-06-07 | Stop reason: WASHOUT

## 2025-06-07 RX ORDER — MULTIPLE VITAMINS W/ MINERALS TAB 9MG-400MCG
1 TAB ORAL DAILY
Status: DISCONTINUED | OUTPATIENT
Start: 2025-06-07 | End: 2025-06-09 | Stop reason: HOSPADM

## 2025-06-07 RX ORDER — QUETIAPINE FUMARATE 50 MG/1
50 TABLET, FILM COATED ORAL 2 TIMES DAILY
Status: DISCONTINUED | OUTPATIENT
Start: 2025-06-07 | End: 2025-06-07

## 2025-06-07 RX ORDER — DIAZEPAM 5 MG/1
5-20 TABLET ORAL EVERY 30 MIN PRN
Status: DISCONTINUED | OUTPATIENT
Start: 2025-06-07 | End: 2025-06-07

## 2025-06-07 RX ORDER — LOPERAMIDE HYDROCHLORIDE 2 MG/1
2 CAPSULE ORAL 4 TIMES DAILY PRN
Status: DISCONTINUED | OUTPATIENT
Start: 2025-06-07 | End: 2025-06-09 | Stop reason: HOSPADM

## 2025-06-07 RX ORDER — ONDANSETRON 8 MG/1
8 TABLET, ORALLY DISINTEGRATING ORAL ONCE
Status: COMPLETED | OUTPATIENT
Start: 2025-06-07 | End: 2025-06-07

## 2025-06-07 RX ORDER — MAGNESIUM HYDROXIDE/ALUMINUM HYDROXICE/SIMETHICONE 120; 1200; 1200 MG/30ML; MG/30ML; MG/30ML
30 SUSPENSION ORAL EVERY 4 HOURS PRN
Status: DISCONTINUED | OUTPATIENT
Start: 2025-06-07 | End: 2025-06-09 | Stop reason: HOSPADM

## 2025-06-07 RX ORDER — ATENOLOL 50 MG/1
50 TABLET ORAL DAILY PRN
Status: DISCONTINUED | OUTPATIENT
Start: 2025-06-07 | End: 2025-06-09 | Stop reason: HOSPADM

## 2025-06-07 RX ORDER — BUPRENORPHINE AND NALOXONE 12; 3 MG/1; MG/1
1 FILM, SOLUBLE BUCCAL; SUBLINGUAL 2 TIMES DAILY
Status: DISCONTINUED | OUTPATIENT
Start: 2025-06-07 | End: 2025-06-09 | Stop reason: HOSPADM

## 2025-06-07 RX ORDER — MODAFINIL 100 MG/1
100 TABLET ORAL DAILY PRN
Status: DISCONTINUED | OUTPATIENT
Start: 2025-06-07 | End: 2025-06-09 | Stop reason: HOSPADM

## 2025-06-07 RX ORDER — POLYETHYLENE GLYCOL 3350 17 G
4 POWDER IN PACKET (EA) ORAL
Status: DISCONTINUED | OUTPATIENT
Start: 2025-06-07 | End: 2025-06-09 | Stop reason: HOSPADM

## 2025-06-07 RX ORDER — ONDANSETRON 4 MG/1
4 TABLET, ORALLY DISINTEGRATING ORAL EVERY 6 HOURS PRN
Status: DISCONTINUED | OUTPATIENT
Start: 2025-06-07 | End: 2025-06-09 | Stop reason: HOSPADM

## 2025-06-07 RX ORDER — QUETIAPINE FUMARATE 50 MG/1
50 TABLET, FILM COATED ORAL AT BEDTIME
Status: DISCONTINUED | OUTPATIENT
Start: 2025-06-07 | End: 2025-06-09 | Stop reason: HOSPADM

## 2025-06-07 RX ORDER — HYDROXYZINE HYDROCHLORIDE 50 MG/1
50 TABLET, FILM COATED ORAL EVERY 4 HOURS PRN
Status: DISCONTINUED | OUTPATIENT
Start: 2025-06-07 | End: 2025-06-09 | Stop reason: HOSPADM

## 2025-06-07 RX ORDER — GABAPENTIN 300 MG/1
900 CAPSULE ORAL AT BEDTIME
Status: DISCONTINUED | OUTPATIENT
Start: 2025-06-07 | End: 2025-06-07

## 2025-06-07 RX ORDER — FOLIC ACID 1 MG/1
1 TABLET ORAL DAILY
Status: DISCONTINUED | OUTPATIENT
Start: 2025-06-07 | End: 2025-06-09 | Stop reason: HOSPADM

## 2025-06-07 RX ORDER — GABAPENTIN 300 MG/1
900 CAPSULE ORAL AT BEDTIME
Status: DISCONTINUED | OUTPATIENT
Start: 2025-06-07 | End: 2025-06-09 | Stop reason: HOSPADM

## 2025-06-07 RX ORDER — CLONIDINE HYDROCHLORIDE 0.1 MG/1
.1-.2 TABLET ORAL 2 TIMES DAILY PRN
Status: DISCONTINUED | OUTPATIENT
Start: 2025-06-07 | End: 2025-06-09 | Stop reason: HOSPADM

## 2025-06-07 RX ORDER — ACETAMINOPHEN 325 MG/1
650 TABLET ORAL EVERY 4 HOURS PRN
Status: DISCONTINUED | OUTPATIENT
Start: 2025-06-07 | End: 2025-06-09 | Stop reason: HOSPADM

## 2025-06-07 RX ORDER — SERTRALINE HYDROCHLORIDE 100 MG/1
100 TABLET, FILM COATED ORAL DAILY
Status: DISCONTINUED | OUTPATIENT
Start: 2025-06-08 | End: 2025-06-09 | Stop reason: HOSPADM

## 2025-06-07 RX ORDER — AMOXICILLIN 250 MG
1 CAPSULE ORAL 2 TIMES DAILY PRN
Status: DISCONTINUED | OUTPATIENT
Start: 2025-06-07 | End: 2025-06-09 | Stop reason: HOSPADM

## 2025-06-07 RX ORDER — SERTRALINE HYDROCHLORIDE 100 MG/1
100 TABLET, FILM COATED ORAL DAILY
Status: DISCONTINUED | OUTPATIENT
Start: 2025-06-07 | End: 2025-06-07

## 2025-06-07 RX ORDER — ONDANSETRON 4 MG/1
4 TABLET, ORALLY DISINTEGRATING ORAL ONCE
Status: COMPLETED | OUTPATIENT
Start: 2025-06-07 | End: 2025-06-07

## 2025-06-07 RX ADMIN — DIAZEPAM 5 MG: 5 TABLET ORAL at 00:12

## 2025-06-07 RX ADMIN — HYDROXYZINE HYDROCHLORIDE 50 MG: 50 TABLET ORAL at 16:58

## 2025-06-07 RX ADMIN — BUPRENORPHINE AND NALOXONE 1 FILM: 12; 3 FILM, SOLUBLE BUCCAL; SUBLINGUAL at 02:14

## 2025-06-07 RX ADMIN — Medication 1 TABLET: at 07:53

## 2025-06-07 RX ADMIN — DIAZEPAM 10 MG: 5 TABLET ORAL at 20:39

## 2025-06-07 RX ADMIN — NICOTINE POLACRILEX 4 MG: 2 LOZENGE ORAL at 12:51

## 2025-06-07 RX ADMIN — THIAMINE HCL TAB 100 MG 100 MG: 100 TAB at 07:52

## 2025-06-07 RX ADMIN — DIAZEPAM 10 MG: 5 TABLET ORAL at 12:02

## 2025-06-07 RX ADMIN — CLONIDINE HYDROCHLORIDE 0.2 MG: 0.1 TABLET ORAL at 13:54

## 2025-06-07 RX ADMIN — HYDROXYZINE HYDROCHLORIDE 25 MG: 25 TABLET, FILM COATED ORAL at 09:56

## 2025-06-07 RX ADMIN — NICOTINE POLACRILEX 4 MG: 2 LOZENGE ORAL at 19:27

## 2025-06-07 RX ADMIN — DIAZEPAM 10 MG: 5 TABLET ORAL at 16:58

## 2025-06-07 RX ADMIN — ONDANSETRON 8 MG: 8 TABLET, ORALLY DISINTEGRATING ORAL at 02:21

## 2025-06-07 RX ADMIN — HYDROXYZINE HYDROCHLORIDE 50 MG: 50 TABLET ORAL at 20:39

## 2025-06-07 RX ADMIN — BUPRENORPHINE AND NALOXONE 1 FILM: 12; 3 FILM, SOLUBLE BUCCAL; SUBLINGUAL at 20:39

## 2025-06-07 RX ADMIN — DIAZEPAM 5 MG: 5 TABLET ORAL at 09:56

## 2025-06-07 RX ADMIN — FOLIC ACID 1 MG: 1 TABLET ORAL at 07:53

## 2025-06-07 RX ADMIN — DIAZEPAM 10 MG: 5 TABLET ORAL at 07:52

## 2025-06-07 RX ADMIN — GABAPENTIN 900 MG: 300 CAPSULE ORAL at 01:37

## 2025-06-07 RX ADMIN — ONDANSETRON 4 MG: 4 TABLET, ORALLY DISINTEGRATING ORAL at 08:58

## 2025-06-07 ASSESSMENT — ACTIVITIES OF DAILY LIVING (ADL)
ADLS_ACUITY_SCORE: 41
ADLS_ACUITY_SCORE: 41
ADLS_ACUITY_SCORE: 15
ADLS_ACUITY_SCORE: 41
ADLS_ACUITY_SCORE: 15
ADLS_ACUITY_SCORE: 41
ADLS_ACUITY_SCORE: 15
ADLS_ACUITY_SCORE: 41
ADLS_ACUITY_SCORE: 41
ADLS_ACUITY_SCORE: 15
ADLS_ACUITY_SCORE: 41
LAUNDRY: WITH SUPERVISION
ADLS_ACUITY_SCORE: 15
ADLS_ACUITY_SCORE: 15
ADLS_ACUITY_SCORE: 41
ADLS_ACUITY_SCORE: 15
ADLS_ACUITY_SCORE: 41
ADLS_ACUITY_SCORE: 15
HYGIENE/GROOMING: INDEPENDENT
ORAL_HYGIENE: INDEPENDENT
ADLS_ACUITY_SCORE: 41
ADLS_ACUITY_SCORE: 41
DRESS: INDEPENDENT
ADLS_ACUITY_SCORE: 15
ADLS_ACUITY_SCORE: 15
ADLS_ACUITY_SCORE: 41
ADLS_ACUITY_SCORE: 15

## 2025-06-07 NOTE — TELEPHONE ENCOUNTER
Please advise S: [facility] Ameya , Provider Jaren calling at 2:29 AM with clinical on 29 year old/male presenting for alcohol detox.     B: Pt presents for ETOH detox.   Currently reports drinking 1 L of vodka, daily for 2 months.    Patient reports last use was 1 hour pta.  Pt JASON: 258  Pt  endorses hx of DT  Pt  denies hx of seizures. Last seizure: N/A  Pt endorsing the following symptoms of withdrawal: Tremulous  MSSA Score: 8- Valium    Pt denies acute mental health or medical concerns.   Pt denies other drug use: None Amount/frequency: N/A    Does Pt have a detox care plan in [facility]? No  Does pt present with specific needs, assistive devices, or exclusionary criteria? None  Is the patient ambulating, eating and drinking in the ED? Yes    A: Pt meets criteria to be presented for IP detox admission. Patient is voluntary    COVID Symptoms: No  If yes, COVID test required   Utox: In process  Magnesium: WNL  CMP: WNL  CBC: WNL  HCG: N/A     R: Patient cleared and ready for behavioral bed placement: Yes    Pt is meeting criteria for presentation to //[facility]    Does Patient need a Transfer Center request created? No, Pt is located within [facility] ED, [facility] ED, or [facility]    3:13 AM Intake called [facility] and spoke with Ping mendieta.     3:23 AM Intake received a call from Dr. Bradley Salmon, accepting this pt to [facility].     3:29 AM Indicia complete.     3:31 AM Intake called st  and provided disposition to Lydia CARRIZALES. Nurse report: Charge will call ED.     3:32 AM Intake called [facility] ED and provided disposition to Nadine GARCIA.

## 2025-06-07 NOTE — PHARMACY-ADMISSION MEDICATION HISTORY
Pharmacist Admission Medication History    Admission medication history is complete. The information provided in this note is only as accurate as the sources available at the time of the update.    Information Source(s): Patient and CareEverywhere/SureScripts via in-person    Pertinent Information:   Medication history completed via patient interview at the bedside    Changes made to PTA medication list:  Added: None  Deleted:   Diazepam - patient states no longer taking  Changed:   Quetiapine - switched from 50 mg BID to 50 mg at bedtime as patient endorses this schedule    Allergies reviewed with patient and updates made in EHR: yes    Medication History Completed By: Kya Downey RPH 6/7/2025 6:29 PM    PTA Med List   Medication Sig Last Dose/Taking    Buprenorphine HCl-Naloxone HCl (SUBOXONE) 12-3 MG FILM per film Place 1 Film under the tongue 2 times daily. 6/6/2025    cloNIDine (CATAPRES) 0.1 MG tablet Take 1-2 tablets (0.1-0.2 mg) by mouth 2 times daily as needed (withdrawal symptoms, anxiety). Unknown    gabapentin (NEURONTIN) 300 MG capsule Take 3-4 capsules (900-1,200 mg) by mouth at bedtime. 6/5/2025    modafinil (PROVIGIL) 100 MG tablet Take 100 mg by mouth daily as needed (narcolepsy). Unknown    QUEtiapine (SEROQUEL) 50 MG tablet Take 1 tablet (50 mg) by mouth 2 times daily. (Patient taking differently: Take 50 mg by mouth at bedtime.) 6/5/2025    sertraline (ZOLOFT) 100 MG tablet Take 1 tablet (100 mg) by mouth daily 6/6/2025     Kya Downey, PharmD  PGY1 Pharmacy Resident  [facility] [place]

## 2025-06-07 NOTE — ED TRIAGE NOTES
Patient requesting detox from alcohol. Last drink PTA, drinks 1L per day.     Denies history of seizures or other drug use

## 2025-06-07 NOTE — ED PROVIDER NOTES
[facility] EMERGENCY DEPARTMENT ([facility])    6/06/25       ED PROVIDER NOTE  History     Chief Complaint   Patient presents with    Alcohol Problem     HPI  Hemanth Hurd is a 29 year old male with a past medical history of severe alcohol use disorder, opioid use disorder, EMILY, and drug induced insomnia, who presents to the ED seeking detox from alcohol.  The patient states that he resumed drinking alcohol 2 months ago.  He states he drinks a liter of vodka per day.  He becomes tremulous and has hallucinated in the past with attempted alcohol cessation.  Patient denies his alcohol withdrawal seizure.  He denies any recent fall.  He does state that he did scrape his left shin a couple days ago.  He denies difficulty with weightbearing.  He denies any depression or suicide ideation.  Patient denies any recent illness or medical concerns.  His tetanus imitation is up-to-date according to [facility].  Patient denies other substance use    Past Medical History  Past Medical History:   Diagnosis Date    History of alcohol abuse     History of benzodiazepine use     History of opiate therapy      No past surgical history on file.  Buprenorphine HCl-Naloxone HCl (SUBOXONE) 12-3 MG FILM per film  cloNIDine (CATAPRES) 0.1 MG tablet  diazepam (VALIUM) 5 MG tablet  gabapentin (NEURONTIN) 300 MG capsule  modafinil (PROVIGIL) 100 MG tablet  naloxone (NARCAN) 4 MG/0.1ML nasal spray  QUEtiapine (SEROQUEL) 50 MG tablet  sertraline (ZOLOFT) 100 MG tablet      No Known Allergies  Family History  Family History   Problem Relation Age of Onset    Rheumatoid Arthritis Mother     Mental Illness Father      Social History   Social History     Tobacco Use    Smoking status: Former     Types: Cigarettes    Smokeless tobacco: Former     Types: Chew     Quit date: 12/2021    Tobacco comments:     Only when unable to vape   Vaping Use    Vaping status: Every Day    Substances: Nicotine, Flavoring    Devices: [email or web address] tank    Substance Use Topics    Alcohol use: Not Currently     Comment: hx of abuse    Drug use: Not Currently     Types: Benzodiazepines, Opiates      A medically appropriate review of systems was performed with pertinent positives and negatives noted in the HPI, and all other systems negative.    Physical Exam   BP: (!) 145/91  Pulse: (!) 130  Temp: 98.3  F (36.8  C)  Resp: 16  SpO2: 96 %  Physical Exam  Vitals and nursing note reviewed.   Constitutional:       General: He is not in acute distress.     Appearance: He is not diaphoretic.   HENT:      Head: Atraumatic.   Eyes:      General: No scleral icterus.     Pupils: Pupils are equal, round, and reactive to light.   Cardiovascular:      Rate and Rhythm: Normal rate and regular rhythm.      Heart sounds: Normal heart sounds.   Pulmonary:      Effort: No respiratory distress.      Breath sounds: Normal breath sounds.   Abdominal:      General: Bowel sounds are normal.      Palpations: Abdomen is soft.      Tenderness: There is no abdominal tenderness.   Musculoskeletal:         General: No tenderness.        Legs:    Skin:     General: Skin is warm and dry.      Findings: No rash.   Neurological:      General: No focal deficit present.      Sensory: Sensation is intact.      Motor: No weakness.      Coordination: Coordination normal.   Psychiatric:         Mood and Affect: Mood normal. Affect is tearful.           ED Course, Procedures, & Data      Procedures            EKG Interpretation:      Interpreted by ELIUD ARAGON MD, MD  Time reviewed: 2134  Symptoms at time of EKG: tachycardia   Rhythm: normal sinus   Rate: normal  Axis: normal  Ectopy: none  Conduction: normal  ST Segments/ T Waves: No ST-T wave changes  Q Waves: none  Comparison to prior: No old EKG available    Clinical Impression: normal EKG         EKG Interpretation #2:      Interpreted by ELIUD ARAGON MD, MD  Time reviewed:0053   Symptoms at time of EKG: Tachycardia  Rhythm: sinus tachycardia  Rate:  102  Axis: NORMAL  Ectopy: none  Conduction: normal  ST Segments/ T Waves: No ST-T wave changes  Q Waves: none  Comparison to prior: Previously normal sinus rhythm    Clinical Impression: Sinus tachycardia    Results for orders placed or performed during the hospital encounter of 06/06/25   Comprehensive metabolic panel     Status: Abnormal   Result Value Ref Range    Sodium 140 135 - 145 mmol/L    Potassium      Carbon Dioxide (CO2) 18 (L) 22 - 29 mmol/L    Anion Gap 23 (H) 7 - 15 mmol/L    Urea Nitrogen 8.3 6.0 - 20.0 mg/dL    Creatinine 0.74 0.67 - 1.17 mg/dL    GFR Estimate >90 >60 mL/min/1.73m2    Calcium 9.2 8.8 - 10.4 mg/dL    Chloride 99 98 - 107 mmol/L    Glucose 115 (H) 70 - 99 mg/dL    Alkaline Phosphatase      AST      ALT      Protein Total 8.3 6.4 - 8.3 g/dL    Albumin 4.6 3.5 - 5.2 g/dL    Bilirubin Total 0.2 <=1.2 mg/dL   Magnesium     Status: Normal   Result Value Ref Range    Magnesium 1.9 1.7 - 2.3 mg/dL   CBC with platelets and differential     Status: Abnormal   Result Value Ref Range    WBC Count 12.5 (H) 4.0 - 11.0 10e3/uL    RBC Count 5.13 4.40 - 5.90 10e6/uL    Hemoglobin 15.2 13.3 - 17.7 g/dL    Hematocrit 44.6 40.0 - 53.0 %    MCV 87 78 - 100 fL    MCH 29.6 26.5 - 33.0 pg    MCHC 34.1 31.5 - 36.5 g/dL    RDW 14.0 10.0 - 15.0 %    Platelet Count 283 150 - 450 10e3/uL    % Neutrophils 71 %    % Lymphocytes 21 %    % Monocytes 7 %    % Eosinophils 0 %    % Basophils 1 %    % Immature Granulocytes 0 %    NRBCs per 100 WBC 0 <1 /100    Absolute Neutrophils 8.8 (H) 1.6 - 8.3 10e3/uL    Absolute Lymphocytes 2.7 0.8 - 5.3 10e3/uL    Absolute Monocytes 0.9 0.0 - 1.3 10e3/uL    Absolute Eosinophils 0.0 0.0 - 0.7 10e3/uL    Absolute Basophils 0.1 0.0 - 0.2 10e3/uL    Absolute Immature Granulocytes 0.0 <=0.4 10e3/uL    Absolute NRBCs 0.0 10e3/uL   Potassium     Status: Normal   Result Value Ref Range    Potassium 3.9 3.4 - 5.3 mmol/L   AST     Status: Normal   Result Value Ref Range    AST 43 0 - 45  U/L   ALT     Status: Normal   Result Value Ref Range    ALT 46 0 - 70 U/L   Alkaline phosphatase     Status: Normal   Result Value Ref Range    Alkaline Phosphatase 85 40 - 150 U/L   EKG 12-lead, tracing only     Status: None (Preliminary result)   Result Value Ref Range    Systolic Blood Pressure  mmHg    Diastolic Blood Pressure  mmHg    Ventricular Rate 97 BPM    Atrial Rate 97 BPM    FL Interval 162 ms    QRS Duration 94 ms     ms    QTc 447 ms    P Axis 42 degrees    R AXIS -19 degrees    T Axis 4 degrees    Interpretation ECG Sinus rhythm  Normal ECG      Alcohol breath test POCT     Status: Abnormal   Result Value Ref Range    Alcohol Breath Test 0.258 (A) 0.00 - 0.01   CBC with platelets differential     Status: Abnormal    Narrative    The following orders were created for panel order CBC with platelets differential.  Procedure                               Abnormality         Status                     ---------                               -----------         ------                     CBC with platelets and ...[[id number]]  Abnormal            Final result                 Please view results for these tests on the individual orders.   Urine Drug Screen     Status: None ()    Narrative    The following orders were created for panel order Urine Drug Screen.  Procedure                               Abnormality         Status                     ---------                               -----------         ------                     Urine Drug Screen Panel[[id number]]                                                      Please view results for these tests on the individual orders.   Urine Drug Screen     Status: None (In process)    Narrative    The following orders were created for panel order Urine Drug Screen.  Procedure                               Abnormality         Status                     ---------                               -----------         ------                     Urine Drug Screen  Panel[[id number]]                         In process                   Please view results for these tests on the individual orders.               Results for orders placed or performed during the hospital encounter of 06/06/25   Comprehensive metabolic panel     Status: Abnormal   Result Value Ref Range    Sodium 140 135 - 145 mmol/L    Potassium      Carbon Dioxide (CO2) 18 (L) 22 - 29 mmol/L    Anion Gap 23 (H) 7 - 15 mmol/L    Urea Nitrogen 8.3 6.0 - 20.0 mg/dL    Creatinine 0.74 0.67 - 1.17 mg/dL    GFR Estimate >90 >60 mL/min/1.73m2    Calcium 9.2 8.8 - 10.4 mg/dL    Chloride 99 98 - 107 mmol/L    Glucose 115 (H) 70 - 99 mg/dL    Alkaline Phosphatase      AST      ALT      Protein Total 8.3 6.4 - 8.3 g/dL    Albumin 4.6 3.5 - 5.2 g/dL    Bilirubin Total 0.2 <=1.2 mg/dL   Magnesium     Status: Normal   Result Value Ref Range    Magnesium 1.9 1.7 - 2.3 mg/dL   CBC with platelets and differential     Status: Abnormal   Result Value Ref Range    WBC Count 12.5 (H) 4.0 - 11.0 10e3/uL    RBC Count 5.13 4.40 - 5.90 10e6/uL    Hemoglobin 15.2 13.3 - 17.7 g/dL    Hematocrit 44.6 40.0 - 53.0 %    MCV 87 78 - 100 fL    MCH 29.6 26.5 - 33.0 pg    MCHC 34.1 31.5 - 36.5 g/dL    RDW 14.0 10.0 - 15.0 %    Platelet Count 283 150 - 450 10e3/uL    % Neutrophils 71 %    % Lymphocytes 21 %    % Monocytes 7 %    % Eosinophils 0 %    % Basophils 1 %    % Immature Granulocytes 0 %    NRBCs per 100 WBC 0 <1 /100    Absolute Neutrophils 8.8 (H) 1.6 - 8.3 10e3/uL    Absolute Lymphocytes 2.7 0.8 - 5.3 10e3/uL    Absolute Monocytes 0.9 0.0 - 1.3 10e3/uL    Absolute Eosinophils 0.0 0.0 - 0.7 10e3/uL    Absolute Basophils 0.1 0.0 - 0.2 10e3/uL    Absolute Immature Granulocytes 0.0 <=0.4 10e3/uL    Absolute NRBCs 0.0 10e3/uL   Potassium     Status: Normal   Result Value Ref Range    Potassium 3.9 3.4 - 5.3 mmol/L   AST     Status: Normal   Result Value Ref Range    AST 43 0 - 45 U/L   ALT     Status: Normal   Result Value Ref Range    ALT 46  0 - 70 U/L   Alkaline phosphatase     Status: Normal   Result Value Ref Range    Alkaline Phosphatase 85 40 - 150 U/L   EKG 12-lead, tracing only     Status: None (Preliminary result)   Result Value Ref Range    Systolic Blood Pressure  mmHg    Diastolic Blood Pressure  mmHg    Ventricular Rate 97 BPM    Atrial Rate 97 BPM    MN Interval 162 ms    QRS Duration 94 ms     ms    QTc 447 ms    P Axis 42 degrees    R AXIS -19 degrees    T Axis 4 degrees    Interpretation ECG Sinus rhythm  Normal ECG      Alcohol breath test POCT     Status: Abnormal   Result Value Ref Range    Alcohol Breath Test 0.258 (A) 0.00 - 0.01   CBC with platelets differential     Status: Abnormal    Narrative    The following orders were created for panel order CBC with platelets differential.  Procedure                               Abnormality         Status                     ---------                               -----------         ------                     CBC with platelets and ...[[id number]]  Abnormal            Final result                 Please view results for these tests on the individual orders.   Urine Drug Screen     Status: None ()    Narrative    The following orders were created for panel order Urine Drug Screen.  Procedure                               Abnormality         Status                     ---------                               -----------         ------                     Urine Drug Screen Panel[[id number]]                                                      Please view results for these tests on the individual orders.   Urine Drug Screen     Status: None (In process)    Narrative    The following orders were created for panel order Urine Drug Screen.  Procedure                               Abnormality         Status                     ---------                               -----------         ------                     Urine Drug Screen Panel[[id number]]                         In process                    Please view results for these tests on the individual orders.     Medications   diazepam (VALIUM) tablet 5-20 mg (5 mg Oral $Given 6/7/25 0012)   thiamine (B-1) tablet 100 mg (has no administration in time range)   folic acid (FOLVITE) tablet 1 mg (has no administration in time range)   multivitamin w/minerals (THERA-VIT-M) tablet 1 tablet (has no administration in time range)   gabapentin (NEURONTIN) capsule 900 mg (900 mg Oral $Given 6/7/25 0137)   sodium chloride 0.9% BOLUS 1,000 mL (0 mLs Intravenous Stopped 6/6/25 2345)   nicotine polacrilex (NICORETTE) gum 4 mg (4 mg Buccal $Given 6/6/25 2310)   dextrose 5% and 0.9% NaCl BOLUS 1,000 mL (0 mLs Intravenous Stopped 6/7/25 0109)   Buprenorphine HCl-Naloxone HCl (SUBOXONE) 12-3 MG per film 1 Film (1 Film Sublingual $Given 6/7/25 0214)   ondansetron (ZOFRAN ODT) ODT tab 8 mg (8 mg Oral $Given 6/7/25 0221)     Labs Ordered and Resulted from Time of ED Arrival to Time of ED Departure   COMPREHENSIVE METABOLIC PANEL - Abnormal       Result Value    Sodium 140      Potassium        Carbon Dioxide (CO2) 18 (*)     Anion Gap 23 (*)     Urea Nitrogen 8.3      Creatinine 0.74      GFR Estimate >90      Calcium 9.2      Chloride 99      Glucose 115 (*)     Alkaline Phosphatase        AST        ALT        Protein Total 8.3      Albumin 4.6      Bilirubin Total 0.2     CBC WITH PLATELETS AND DIFFERENTIAL - Abnormal    WBC Count 12.5 (*)     RBC Count 5.13      Hemoglobin 15.2      Hematocrit 44.6      MCV 87      MCH 29.6      MCHC 34.1      RDW 14.0      Platelet Count 283      % Neutrophils 71      % Lymphocytes 21      % Monocytes 7      % Eosinophils 0      % Basophils 1      % Immature Granulocytes 0      NRBCs per 100 WBC 0      Absolute Neutrophils 8.8 (*)     Absolute Lymphocytes 2.7      Absolute Monocytes 0.9      Absolute Eosinophils 0.0      Absolute Basophils 0.1      Absolute Immature Granulocytes 0.0      Absolute NRBCs 0.0     ALCOHOL BREATH TEST POCT -  Abnormal    Alcohol Breath Test 0.258 (*)    MAGNESIUM - Normal    Magnesium 1.9     POTASSIUM - Normal    Potassium 3.9     AST - Normal    AST 43     ALT - Normal    ALT 46     ALKALINE PHOSPHATASE - Normal    Alkaline Phosphatase 85     URINE DRUG SCREEN PANEL   URINE DRUG SCREEN PANEL     No orders to display      Reviewed [facility] emergency department encounter on 4/7/25.  Reviewed emergency department encounter and subsequent detox admission from 12/5/2024 for alcohol use disorder.  Reviewed previous CBC, comprehensive metabolic panel, urine toxicology testing, alcohol level.    Critical care was not performed.     Medical Decision Making  The patient's presentation was of moderate complexity (a chronic illness mild to moderate exacerbation, progression, or side effect of treatment).    The patient's evaluation involved:  review of external note(s) from 3+ sources (see separate area of note for details)  review of 3+ test result(s) ordered prior to this encounter (see separate area of note for details)  ordering and/or review of 3+ test(s) in this encounter (see separate area of note for details)  independent interpretation of testing performed by another health professional (EKG interpreted by me with above results)  discussion of management or test interpretation with another health professional (mental health empiric)    The patient's management necessitated high risk (a decision regarding hospitalization).    Assessment & Plan    29 year old male with history of alcohol dependence to the emergency department seeking detox.  He has been drinking alcohol daily for the past 2 months.  He describes symptoms of alcohol withdrawal with attempt at alcohol cessation.  Patient does not have any acute medical concerns.  His labs are remarkable for mild anion gap acidosis that is likely related to alcohol related ketosis.  He has a mild leukocytosis but no infectious symptoms.  His breathalyzer is elevated to  0.258.  He does not have any acute mental health concerns.  Patient initiated on MSSA.  He was given IV normal saline as well as a D5 normal saline to address his anion gap acidosis.  He was periodically tachycardic in the emergency department the EKG was either sinus rhythm or sinus tachycardia (rate 102).  No clinical evidence for significant arrhythmia.  He is asymptomatic here in the emergency department.  He appears medically stable for detox admission    I have reviewed the nursing notes. I have reviewed the findings, diagnosis, plan and need for follow up with the patient.    New Prescriptions    No medications on file       Final diagnoses:   Alcohol dependence with uncomplicated intoxication (H)     Chart documentation was completed with Dragon voice-recognition software. Even though reviewed, this chart may still contain some grammatical, spelling, and word errors.     [facility] EMERGENCY DEPARTMENT  6/6/2025     Stoney Eastman MD  06/07/25 0235       Stoney Eastman MD  06/07/25 0236

## 2025-06-07 NOTE — H&P
Psychiatry History and Physical    Hemanth Hurd MRN# [id number]   Age: 29 year old YOB: 1995     Date of Admission:  6/6/2025     Assessment:     This patient is a 29 year old male with a history of alcohol use disorder, opioid use disorder on MAT, benzodiazepine use disorder, generalized anxiety disorder, and drug-induced insomnia who presented to [facility] ED seeking detox from alcohol. Family history significant for alcohol use, cannabis use, and opiate use. Patient was admitted on a voluntary basis to [facility] detox. Patient was started on MSSA protocol using Valium. Patient reports a history of hallucinations and tremors with alcohol cessation. He denies a history of withdrawal seizures. Thiamine, folic acid, and multivitamin were ordered on admission. IM consult placed to address acute medical concerns. Symptoms and presentation at this time are most consistent with Alcohol Use Disorder, severe, in withdrawal, complicated by depression and anxiety. I have discussed the risks, benefits, and alternatives of Zoloft, which patient was taking prior to admission. Patient reported benefit from Zoloft and agrees to continue this medication. Patient will likely benefit from CD treatment upon discharge. CTC will be meeting with patient to discuss dispo plan. Patient agrees to  consider anti-craving medication prior to discharge. Inpatient psychiatric hospitalization is warranted at this time for safety, stabilization, and possible adjustment in medications.    Diagnoses:     Alcohol Use Disorder, severe, in withdrawal, complicated by depression and anxiety  Opioid use disorder, in remission, on MAT  Nicotine Use Disorder  Generalized anxiety disorder  Major depressive disorder   Drug-induced insomnia    Clinically Significant Risk Factors Present on Admission   # Hypertension: Home medication list includes antihypertensive(s)  # Financial/Environmental Concerns:     Plan:     Psychiatric  treatment/inteventions:    Alcohol Withdrawal:  - Continue MSSA protocol using Valium for management of alcohol withdrawal.  - Continue thiamine, folate, and multivitamin daily.  - Consider anti-craving medications prior to discharge.  Pt willing to review additional information about anti-craving medications prior to discharge.    - Continue PRN Zofran as needed for nausea.  - Withdrawal precautions in place.    Opioid use disorder:  - Continue PTA Suboxone 12 mg twice daily.    Depression/Anxiety:  - Continue PTA Zoloft 100 mg daily.  - Continue PTA Seroquel 50 mg at bedtime (patient denies taking BID as per outpatient order).  - Continue hydroxyzine 50 mg q4h PRN for acute anxiety.  - Continue Trazodone 50 mg at bedtime PRN for sleep disturbances.     Patient will be treated in therapeutic milieu with appropriate individual and group therapies as described.    Nicotine Use Disorder:  Replacement available.    Medical treatment/interventions:  Medical concerns: Alcohol withdrawal  Labs: Will add hemoglobin A1c, lipid panel, B12/folate, vitamin D.  All labs were reviewed.  Abnormal labs included carbon dioxide 18, anion gap 23, glucose 115, GGT 75, WBC 12.5, ANC 8.8.    U-Tox:  remarkable for benzodiazepines.  Patient received Valium in the ED.  Consults: Routine IM consult placed. Appreciate assistance.  Per IM Note dated 6/7/2025:  Hemanth Hurd is a 29 year old yo male with hx of depression, anxiety, OUD and etoh use disorder, admitted to  psychiatric detox unit (3A) after presenting to ED with etoh abuse and desire for medically supervised etoh withdrawal. Internal medicine consulted for medical co-management.      # Etoh use disorder and sz withdrawal ppx: Management per psychiatry primary team.      # Recurrent tachycardia and elevated BPs: Most likely due to etoh withdrawal and anxiety. EKG in ED with sinus tachy and otherwise normal.   - Prn po hydralazine ordered with parameters to give  - Continue  to monitor     # Leukocytosis: Initial WBC here 12.5, most likely due to stress as pt afebrile and he denies having any sxs of underlying infection.   - Follow up with PCP after discharge.     The patient's care was discussed with the Patient and communicated to primary team via this note. Medicine signing off. Please feel free to call with questions.       Hiram Bucio PA-C  Hospitalist Service  Securely message with [employer] (more info)  Text page via [facility] Paging/Directory     Legal Status: Voluntary    Safety Assessment:   Checks: Status 15  Pt has not required locked seclusion or restraints in the past 24 hours to maintain safety, please refer to RN documentation for further details.    The risks, benefits, alternatives and side effects have been discussed and are understood by the patient.    Disposition Plan   Reason for ongoing admission: requires detoxification from substance that poses a risk of bodily harm during withdrawal period  Discharge location: D. Patient would benefit from increased CD supports.   Discharge Medications: not ordered  Follow-up Appointments: not scheduled  Anticipated length of stay: 2-3 days    Entered by: YANICK Ugalde CNP on 6/7/2025 at 1:39 PM    Chief Complaint:     Alcohol Detox    History of Present Illness:     Per ED Provider Note dated 6/6/2025:  Hemanth Hurd is a 29 year old male with a past medical history of severe alcohol use disorder, opioid use disorder, EMILY, and drug induced insomnia, who presents to the ED seeking detox from alcohol.  The patient states that he resumed drinking alcohol 2 months ago.  He states he drinks a liter of vodka per day.  He becomes tremulous and has hallucinated in the past with attempted alcohol cessation.  Patient denies his [sic] alcohol withdrawal seizure.  He denies any recent fall.  He does state that he did scrape his left shin a couple days ago.  He denies difficulty with weightbearing.  He denies any  depression or suicide ideation.  Patient denies any recent illness or medical concerns.  His tetanus imitation is up-to-date according to [facility].  Patient denies other substance use.    Assessment & Plan   29 year old male with history of alcohol dependence to the emergency department seeking detox.  He has been drinking alcohol daily for the past 2 months.  He describes symptoms of alcohol withdrawal with attempt at alcohol cessation.  Patient does not have any acute medical concerns.  His labs are remarkable for mild anion gap acidosis that is likely related to alcohol related ketosis.  He has a mild leukocytosis but no infectious symptoms.  His breathalyzer is elevated to 0.258.  He does not have any acute mental health concerns.  Patient initiated on MSSA.  He was given IV normal saline as well as a D5 normal saline to address his anion gap acidosis.  He was periodically tachycardic in the emergency department the EKG was either sinus rhythm or sinus tachycardia (rate 102).  No clinical evidence for significant arrhythmia.  He is asymptomatic here in the emergency department.  He appears medically stable for detox admission    Per my interview with patient:  Patient reports he began drinking alcohol at age 17.  His longest period of sobriety was 18 months in 2016.  Patient reports he resumed drinking alcohol approximately 2 months ago after being sober for several days.  He has been drinking 1-1.5 liters of vodka daily.  Alcohol breath test in the ED was 0.258.  U-Tox was remarkable for benzodiazepines.  Patient received Valium in the ED.  Patient reports tremors and hallucinations in the past with alcohol cessation.  He denies a history of alcohol withdrawal seizures.  He has a history of blackouts.  Patient reports completing approximately 6 detoxes in the past.  He has a history of CD treatment.  Most [facility] in December 2024.  Patient reports previously taking Campral and naltrexone.   He denies benefit from these medications.  He is willing to consider initiation of an anticraving medication prior to discharge.        Psychiatric Review of Systems:     Depression:   Reports: depressed mood, changes in sleep, decreased energy, irritability.   Denies: suicidal ideation, decreased interest, changes in appetite, guilt, hopelessness, helplessness, impaired concentration.   Ashlee:   Denies: sleeplessness, increased goal-directed activities, abrupt increase in energy, pressured speech  Psychosis:   Denies: visual hallucinations, auditory hallucinations, paranoia, grandiosity, ideas of reference, thought insertions, thought broadcasting.  Anxiety:   Reports: excessive worries that are difficult to control for the past 6 months  Denies:  panic attacks  PTSD:   Denies: re-experiencing past trauma, nightmares, increased arousal, avoidance of traumatic stimuli, impaired function.  OCD:   Denies: obsessions, checking, symmetry, cleaning, skin picking.  ED:   Denies: restriction, binging, purging.    Medical Review of Systems:     10 point review of systems is otherwise negative unless noted above.       Psychiatric History:     Patient has a history of major depressive disorder and generalized anxiety disorder.  He is prescribed Zoloft, and finds it beneficial for mood and anxiety.  Per records, patient has previously reported suicidal ideations.  Patient currently denies suicidal ideations or homicidal ideations.  Psychotropic medications include Zoloft and Seroquel.       Substance Use History:     Patient has a history of opioid use disorder and is currently on Suboxone.  Per records, patient also has a history of problematic benzodiazepine use.  U-Tox in the ED was unremarkable for benzodiazepines.  Patient received Valium in the ED.    Social History:     Patient grew up in [place], [place].  He was raised by his mother.  He has 3 younger brothers and 1 older sister.  He reports having a good childhood.  He  "completed a college degree in business at [employer].  Patient is not currently in a relationship and has never been .  He has 1 daughter who is 2-1/2 years old.  Patient is not currently employed.  He has previously worked in sales.  Patient is currently residing with his mother.  He reports a DUI approximately 1 month ago.  He denies a history of abuse or trauma.    Family History:     H/o attempted or completed suicides in family:  None    - Dad: opiate use, currently in prison  - \"A lot of people in my family drink [alcohol] and use cannabis.\"    Past Medical History:     Past Medical History:   Diagnosis Date    History of alcohol abuse     History of benzodiazepine use     History of opiate therapy      Depression   Anxiety    Past Surgical History:     No past surgical history on file.    Tooth extraction    Allergies:     No Known Allergies      Medications:     I have reviewed this patient's current medications  Medications Prior to Admission   Medication Sig Dispense Refill Last Dose/Taking    Buprenorphine HCl-Naloxone HCl (SUBOXONE) 12-3 MG FILM per film Place 1 Film under the tongue 2 times daily. 60 Film 1     cloNIDine (CATAPRES) 0.1 MG tablet Take 1-2 tablets (0.1-0.2 mg) by mouth 2 times daily as needed (withdrawal symptoms, anxiety). 120 tablet 3     diazepam (VALIUM) 5 MG tablet Take 2 tablets (10 mg) by mouth every 6 hours as needed for anxiety. (Patient not taking: Reported on 5/8/2025) 8 tablet 0     gabapentin (NEURONTIN) 300 MG capsule Take 3-4 capsules (900-1,200 mg) by mouth at bedtime. 270 capsule 3     modafinil (PROVIGIL) 100 MG tablet Take 100 mg by mouth daily as needed (narcolepsy).       naloxone (NARCAN) 4 MG/0.1ML nasal spray 4 mg once as needed for opioid reversal.       QUEtiapine (SEROQUEL) 50 MG tablet Take 1 tablet (50 mg) by mouth 2 times daily. 90 tablet 1     sertraline (ZOLOFT) 100 MG tablet Take 1 tablet (100 mg) by mouth daily 90 tablet 3      Labs:     Recent " Results (from the past 24 hours)   EKG 12-lead, tracing only    Collection Time: 06/06/25  9:23 PM   Result Value Ref Range    Systolic Blood Pressure  mmHg    Diastolic Blood Pressure  mmHg    Ventricular Rate 97 BPM    Atrial Rate 97 BPM    NE Interval 162 ms    QRS Duration 94 ms     ms    QTc 447 ms    P Axis 42 degrees    R AXIS -19 degrees    T Axis 4 degrees    Interpretation ECG       Sinus rhythm  Normal ECG    Unconfirmed report - interpretation of this ECG is computer generated - see medical record for final interpretation     Alcohol breath test POCT    Collection Time: 06/06/25  9:33 PM   Result Value Ref Range    Alcohol Breath Test 0.258 (A) 0.00 - 0.01   Comprehensive metabolic panel    Collection Time: 06/06/25  9:43 PM   Result Value Ref Range    Sodium 140 135 - 145 mmol/L    Potassium      Carbon Dioxide (CO2) 18 (L) 22 - 29 mmol/L    Anion Gap 23 (H) 7 - 15 mmol/L    Urea Nitrogen 8.3 6.0 - 20.0 mg/dL    Creatinine 0.74 0.67 - 1.17 mg/dL    GFR Estimate >90 >60 mL/min/1.73m2    Calcium 9.2 8.8 - 10.4 mg/dL    Chloride 99 98 - 107 mmol/L    Glucose 115 (H) 70 - 99 mg/dL    Alkaline Phosphatase      AST      ALT      Protein Total 8.3 6.4 - 8.3 g/dL    Albumin 4.6 3.5 - 5.2 g/dL    Bilirubin Total 0.2 <=1.2 mg/dL   Magnesium    Collection Time: 06/06/25  9:43 PM   Result Value Ref Range    Magnesium 1.9 1.7 - 2.3 mg/dL   CBC with platelets and differential    Collection Time: 06/06/25  9:43 PM   Result Value Ref Range    WBC Count 12.5 (H) 4.0 - 11.0 10e3/uL    RBC Count 5.13 4.40 - 5.90 10e6/uL    Hemoglobin 15.2 13.3 - 17.7 g/dL    Hematocrit 44.6 40.0 - 53.0 %    MCV 87 78 - 100 fL    MCH 29.6 26.5 - 33.0 pg    MCHC 34.1 31.5 - 36.5 g/dL    RDW 14.0 10.0 - 15.0 %    Platelet Count 283 150 - 450 10e3/uL    % Neutrophils 71 %    % Lymphocytes 21 %    % Monocytes 7 %    % Eosinophils 0 %    % Basophils 1 %    % Immature Granulocytes 0 %    NRBCs per 100 WBC 0 <1 /100    Absolute Neutrophils  8.8 (H) 1.6 - 8.3 10e3/uL    Absolute Lymphocytes 2.7 0.8 - 5.3 10e3/uL    Absolute Monocytes 0.9 0.0 - 1.3 10e3/uL    Absolute Eosinophils 0.0 0.0 - 0.7 10e3/uL    Absolute Basophils 0.1 0.0 - 0.2 10e3/uL    Absolute Immature Granulocytes 0.0 <=0.4 10e3/uL    Absolute NRBCs 0.0 10e3/uL   EKG 12-lead, tracing only    Collection Time: 06/07/25 12:39 AM   Result Value Ref Range    Systolic Blood Pressure  mmHg    Diastolic Blood Pressure  mmHg    Ventricular Rate 102 BPM    Atrial Rate 102 BPM    AR Interval 160 ms    QRS Duration 92 ms     ms    QTc 450 ms    P Axis 58 degrees    R AXIS -9 degrees    T Axis 14 degrees    Interpretation ECG       Sinus tachycardia  Otherwise normal ECG    Unconfirmed report - interpretation of this ECG is computer generated - see medical record for final interpretation     Potassium    Collection Time: 06/07/25 12:50 AM   Result Value Ref Range    Potassium 3.9 3.4 - 5.3 mmol/L   AST    Collection Time: 06/07/25 12:50 AM   Result Value Ref Range    AST 43 0 - 45 U/L   ALT    Collection Time: 06/07/25 12:50 AM   Result Value Ref Range    ALT 46 0 - 70 U/L   Alkaline phosphatase    Collection Time: 06/07/25 12:50 AM   Result Value Ref Range    Alkaline Phosphatase 85 40 - 150 U/L   GGT    Collection Time: 06/07/25 12:50 AM   Result Value Ref Range    GGT 75 (H) 8 - 61 U/L   TSH with free T4 reflex    Collection Time: 06/07/25 12:50 AM   Result Value Ref Range    TSH 1.59 0.30 - 4.20 uIU/mL   Urine Drug Screen Panel    Collection Time: 06/07/25  2:05 AM   Result Value Ref Range    Amphetamines Urine Screen Negative Screen Negative    Barbituates Urine Screen Negative Screen Negative    Benzodiazepine Urine Screen Positive (A) Screen Negative    Cannabinoids Urine Screen Negative Screen Negative    Cocaine Urine Screen Negative Screen Negative    Fentanyl Qual Urine Screen Negative Screen Negative    Opiates Urine Screen Negative Screen Negative    PCP Urine Screen Negative Screen  "Negative     BP (!) 154/89   Pulse (!) 125   Temp 98.1  F (36.7  C) (Oral)   Resp 16   SpO2 97%   Weight is 0 lbs 0 oz  There is no height or weight on file to calculate BMI.    Psychiatric Mental Status Examination:     Appearance:  awake, alert  Attitude:  cooperative  Eye Contact: good  Mood:  \"depressed\"  Affect:  mood congruent, blunted  Speech:  clear, coherent, decreased content  Language:  fluent in English  Psychomotor Behavior:  no evidence of tardive dyskinesia, dystonia, or tics  Gait/Station:  normal  Thought Process:  goal oriented  Associations:  no loose associations  Thought Content:  Denies SI/HI/AVH; no evidence of psychotic thinking  Insight:  fair  Judgement: fair  Oriented to:  time, person, and place  Attention Span and Concentration:  fair  Recent and Remote Memory:  intact  Fund of Knowledge: appropriate    Physical Exam:     Please refer to physical exam completed by ED provider, Stoney Eastman MD, on 6/6/2025. I agree with the findings and assessment and have no additional findings to add at this time.    Skin:     General: Skin is warm and dry.      Findings: No rash.   Neurological:      General: No focal deficit present.      Sensory: Sensation is intact.      Motor: No weakness.      Coordination: Coordination normal.   Psychiatric:         Mood and Affect: Mood normal. Affect is tearful.     Attestation:  Patient has been seen and evaluated by me, YANICK Ugalde, CNP.  I spent >75 min on the date of the encounter in chart review, patient visit, review of tests, documentation, care coordination, and/or discussion with other providers about the issues documented above.   "

## 2025-06-07 NOTE — PLAN OF CARE
Goal Outcome Evaluation:  3AW Admission Note    S = Situation:   Hemanth ROMERO Vickey is a 29 year old year old male with a chief complaint of Alcohol Problem  Voluntary admission to [facility] for Alcohol withdrawal and detox.    B  = Background:   According to ED and RN to RN was Hemanth agustina 29 year old/male presenting for alcohol detox.    Pt presents for ETOH detox.   Currently reports drinking 1 L of vodka, daily for 2 months.    Patient reports last use was 1 hour pta.  Pt BAC: 258  Pt  endorses hx of DT  Pt  denies hx of seizures.  Substance use history: Alcohol and Vapes  Mental health history: Anxiety , depression  Medical history: HTN  Legal history: Voluntary  Treatment history: [facility]  Living situation: stays with his mom  Recent life stressors: life in general     A  =  Assessment:   During admission interview, pt affect was flat blunted. Anxious moods.Pt was extremely tremulous, tachy, sweating and nauseated.Pt was calm during the interview.  MSSA 14, Valium 10 mg  given on arrival  /123    resp 18  Temp 97.8 SATs 96% at RA   Pt was medicated and later took a shower.  Vitals rechecked and were as below. Pt still tremulous. Encouraged fluids and ginger ale given.  Pt take suboxone  1 karlene, had one at 0200 prior to admission to . Pt brought suboxone 42 films, counted and sent to pharmacy, slip in pt's chart with pt's belonging sheet  BP (!) 150/110 (BP Location: Right arm, Patient Position: Sitting, Cuff Size: Adult Large)   Pulse 110   Temp 98.7  F (37.1  C) (Oral)   Resp 16   SpO2 97%      R =   Request or Recommendation:   Nursing will monitor for withdrawal sx using MSSA and treat with Valium protocol .  Pt will meet with psychiatry, internal medicine, and case management tomorrow.

## 2025-06-07 NOTE — ED PROVIDER NOTES
Emergency Department I-PASS Sign-out      Illness Severity: Stable    Patient Summary:  29 year old male with pertinent PMH of alcohol dependence who presented seeking detox    ED Course/treatment plan: Labs acceptable, mildly tachycardic at times but sinus.    Clinical Impression:  (F10.220) Alcohol dependence with uncomplicated intoxication (H)      Edited by: Stoney Eastman MD at 2025 [id number]    Action List:  Tests to Follow-up:  None    Medications Reconciled/Ordered:  Yes    ED Mental Health Boarding Order Set Used for Diet/PRNs/Other:  Yes    DEC, Extended Care, Psych Consult Orders:  Neither Extended Care nor a Psychiatry consult are indicated.     Situational Awareness & Contingency Plannin Hour Hold Status:  Voluntary, would not hold.  Active Orders  N/A    Psychiatric Emergency:  A psychiatric emergency is not active    Disposition:  Admit/Transfer to Detox    Boarding subsequent shift/day updates:  No events on shift prior    Edited by: Stoney Eastman MD at 2025 0461    Synthesis & Events after sign-out:  No significant events        Gabriel Batista MD   Emergency Medicine     Gabriel Batista MD  [phone]

## 2025-06-07 NOTE — PLAN OF CARE
Rehab Group    Start time: 1645  End time: 1710  Patient time total: 15 minutes    attended partial group    #7 attended   Group Type: occupational therapy and general health and coping   Group Topic Covered: cognitive activities, coping skills, healthy leisure time, and problem solving   Group Session Detail:OT Topic Group: Cognitive wellness and healthy leisure:Patient engaged in a leisure activity with a visuospatial and cognitive component in order to promote: problem solving skills, improve attention, emphasize new learning, exercise cognitive skills/recall, and foster healthy leisure exploration    Patient Response/Contribution:  cooperative with task, socially appropriate, and actively engaged   Patient Detail:pt calm, appropriate. Pt does brighten some with engagement .Pt shared he enjoys reading for his cognitive and overall wellness. Pt engaged in group discussion on cognitive wellness and orientation to tasks available on the unit. Pt was ind with task following initial instructions and demonstration. Pt remained focused for 1 round of activity. Pt responses were in context. Pt endorsed positive response to activity. Pt declined further engagement.       [id number] OT Group (2 or more in attendance)      Patient Active Problem List   Diagnosis    Opioid use disorder, severe, dependence (H)    EMILY (generalized anxiety disorder)    Current moderate episode of major depressive disorder, unspecified whether recurrent (H)    Drug-induced insomnia (H)    Alcohol use disorder, severe, in early remission (H)    Alcohol dependence with uncomplicated intoxication (H)

## 2025-06-07 NOTE — PROGRESS NOTES
06/07/25 1023   Patient Belongings   Did you bring any home meds/supplements to the hospital?  Yes   Patient Belongings other (see comments)   Patient Belongings Put in Hospital Secure Location (Security or Locker, etc.) other (see comments)   Belongings Search Yes   Clothing Search Yes   Second Staff Dany VALENCIA     Storage bin: belt, sweat pants with strings, shorts with strings, shoes    Med room: Medications-Envelope #[id number]: given to Summer JOHNSON per 3A site protocol    Med room bin: Cell phone, vape    With patient: 2 sweatshirts, T-shirt, jeans    No wallet/ No cash/ No cards    ADMISSION:  I am responsible for any personal items that are not sent to the safe or pharmacy. [facility] is not responsible for loss, theft or damage of any property in my possession.  Patient Signature _____________________ Date/Time _____________________  Staff Signature _______________________ Date/Time _____________________  2nd Staff person, if patient is unable/unwilling to sign  ___________________________________ Date/Time _____________________  DISCHARGE:  All personal items have been returned to me.  Patient Signature _____________________ Date/Time _____________________  Staff Signature _______________________ Date/Time _____________________

## 2025-06-07 NOTE — CONSULTS
[facility]  Consult Note - Hospitalist Service  Date of Admission:  6/6/2025  Consult Requested by: Dr. Ramirez  Reason for Consult: Medical co-management    Assessment & Plan   Hemanth Hurd is a 29 year old yo male with hx of depression, anxiety, OUD and etoh use disorder, admitted to IP psychiatric detox unit (3A) after presenting to ED with etoh abuse and desire for medically supervised etoh withdrawal. Internal medicine consulted for medical co-management.     # Etoh use disorder and sz withdrawal ppx: Management per psychiatry primary team.     # Recurrent tachycardia and elevated BPs: Most likely due to etoh withdrawal and anxiety. EKG in ED with sinus tachy and otherwise normal.   - Prn po hydralazine ordered with parameters to give  - Continue to monitor    # Leukocytosis: Initial WBC here 12.5, most likely due to stress as pt afebrile and he denies having any sxs of underlying infection.   - Follow up with PCP after discharge.       The patient's care was discussed with the Patient and communicated to primary team via this note. Medicine signing off. Please feel free to call with questions.      Hiram Bucio PA-C  Hospitalist Service  Securely message with [employer] (more info)  Text page via [facility] Paging/Directory   ______________________________________________________________________    Chief Complaint   Etoh withdrawal    History is obtained from the patient and electronic health record    History of Present Illness   Please refer to H&P by Ms. Higuera, YANICK CNP, dated 6/7/25 for full details. In brief, Hemanth Hurd is a 29 year old yo male with hx of depression, anxiety, OUD and etoh use disorder, admitted to IP psychiatric detox unit (3A) after presenting to ED with etoh abuse and desire for medically supervised etoh withdrawal. Internal medicine consulted for medical co-management.     Currently, pt admits to feeling slightly  better since admission. Denies fevers, chills, chest pain, SOB, nausea, abd pain, bowel and bladder concerns.       Past Medical History    Past Medical History:   Diagnosis Date    History of alcohol abuse     History of benzodiazepine use     History of opiate therapy        Past Surgical History   No past surgical history on file.    Medications   Medications Prior to Admission   Medication Sig Dispense Refill Last Dose/Taking    Buprenorphine HCl-Naloxone HCl (SUBOXONE) 12-3 MG FILM per film Place 1 Film under the tongue 2 times daily. 60 Film 1     cloNIDine (CATAPRES) 0.1 MG tablet Take 1-2 tablets (0.1-0.2 mg) by mouth 2 times daily as needed (withdrawal symptoms, anxiety). 120 tablet 3     diazepam (VALIUM) 5 MG tablet Take 2 tablets (10 mg) by mouth every 6 hours as needed for anxiety. (Patient not taking: Reported on 5/8/2025) 8 tablet 0     gabapentin (NEURONTIN) 300 MG capsule Take 3-4 capsules (900-1,200 mg) by mouth at bedtime. 270 capsule 3     modafinil (PROVIGIL) 100 MG tablet Take 100 mg by mouth daily as needed (narcolepsy).       naloxone (NARCAN) 4 MG/0.1ML nasal spray 4 mg once as needed for opioid reversal.       QUEtiapine (SEROQUEL) 50 MG tablet Take 1 tablet (50 mg) by mouth 2 times daily. 90 tablet 1     sertraline (ZOLOFT) 100 MG tablet Take 1 tablet (100 mg) by mouth daily 90 tablet 3           Review of Systems    The 10 point Review of Systems is negative other than noted in the HPI or here.     Social History   I have reviewed this patient's social history and updated it with pertinent information if needed.  Social History     Tobacco Use    Smoking status: Former     Types: Cigarettes    Smokeless tobacco: Former     Types: Chew     Quit date: 12/2021    Tobacco comments:     Only when unable to vape   Vaping Use    Vaping status: Every Day    Substances: Nicotine, Flavoring    Devices: Refillable tank   Substance Use Topics    Alcohol use: Not Currently     Comment: hx of abuse     Drug use: Not Currently     Types: Benzodiazepines, Opiates         Family History   I have reviewed this patient's family history and updated it with pertinent information if needed.  Family History   Problem Relation Age of Onset    Rheumatoid Arthritis Mother     Mental Illness Father          Allergies   No Known Allergies     Physical Exam   Vital Signs: Temp: 98.7  F (37.1  C) Temp src: Oral BP: (!) 169/123 Pulse: (!) 136   Resp: 18 SpO2: 96 % O2 Device: None (Room air)    Weight: 0 lbs 0 oz  GEN: In NAD  LUNGS: Tachy yet regular, no murmurs appreciated  CV: RRR  ABD: +BSs; SNTND  EXT: No BLE edema  NEURO: AAOx3; CNs grossly intact; No acute focal deficits noted.        Medical Decision Making       35 MINUTES SPENT BY ME on the date of service doing chart review, history, exam, documentation & further activities per the note.      Data   CMP  Recent Labs   Lab 06/07/25  0050 06/06/25  2143   NA  --  140   POTASSIUM 3.9  --    CHLORIDE  --  99   CO2  --  18*   ANIONGAP  --  23*   GLC  --  115*   BUN  --  8.3   CR  --  0.74   GFRESTIMATED  --  >90   MAITE  --  9.2   MAG  --  1.9   PROTTOTAL  --  8.3   ALBUMIN  --  4.6   BILITOTAL  --  0.2   ALKPHOS 85  --    AST 43  --    ALT 46  --      CBC  Recent Labs   Lab 06/06/25  2143   WBC 12.5*   RBC 5.13   HGB 15.2   HCT 44.6   MCV 87   MCH 29.6   MCHC 34.1   RDW 14.0

## 2025-06-07 NOTE — PLAN OF CARE
Goal Outcome Evaluation:    Plan of Care Reviewed With: patient            Problem: Alcohol Withdrawal  Goal: Alcohol Withdrawal Symptom Control  6/7/2025 1331 by Summer Reid, RN  Outcome: Progressing  Patient is still tremulous,tach and nauseated. Pt was able to eat some lunch.  Pt endorsed anxiety 7/10, denies depression.Pt denies SI/HI/SIB, contracts for safety on unit.  MSSA 11, Valium 10 mg given for withdrawal sx.  Pt reported that roommate is snoring  and that if we get him a room with one that not snoring. Writer checked with Charge RN , there was no single room and   Staff will continue to monitor and update changes  BP (!) 154/89   Pulse (!) 125   Temp 98.1  F (36.7  C) (Oral)   Resp 16   SpO2 97%      BP was rechecked was.vs  BP (!) 154/106 (BP Location: Right arm, Patient Position: Sitting, Cuff Size: Adult Large)   Pulse 116   Temp 98.1  F (36.7  C) (Oral)   Resp 16   SpO2 97%   PRN Clonidine 0.2 mg given will continue to monitor and update changes.

## 2025-06-08 PROCEDURE — 250N000013 HC RX MED GY IP 250 OP 250 PS 637: Performed by: REGISTERED NURSE

## 2025-06-08 PROCEDURE — 250N000012 HC RX MED GY IP 250 OP 636 PS 637: Performed by: REGISTERED NURSE

## 2025-06-08 PROCEDURE — 250N000013 HC RX MED GY IP 250 OP 250 PS 637: Performed by: PSYCHIATRY & NEUROLOGY

## 2025-06-08 PROCEDURE — 128N000004 HC R&B CD ADULT

## 2025-06-08 RX ADMIN — SERTRALINE HYDROCHLORIDE 100 MG: 100 TABLET ORAL at 11:30

## 2025-06-08 RX ADMIN — GABAPENTIN 900 MG: 300 CAPSULE ORAL at 20:23

## 2025-06-08 RX ADMIN — HYDROXYZINE HYDROCHLORIDE 50 MG: 50 TABLET ORAL at 11:30

## 2025-06-08 RX ADMIN — QUETIAPINE FUMARATE 50 MG: 50 TABLET ORAL at 20:23

## 2025-06-08 RX ADMIN — THIAMINE HCL TAB 100 MG 100 MG: 100 TAB at 11:33

## 2025-06-08 RX ADMIN — NICOTINE POLACRILEX 4 MG: 2 LOZENGE ORAL at 11:30

## 2025-06-08 RX ADMIN — FOLIC ACID 1 MG: 1 TABLET ORAL at 11:30

## 2025-06-08 RX ADMIN — Medication 1 TABLET: at 11:30

## 2025-06-08 RX ADMIN — BUPRENORPHINE AND NALOXONE 1 FILM: 12; 3 FILM, SOLUBLE BUCCAL; SUBLINGUAL at 11:30

## 2025-06-08 RX ADMIN — BUPRENORPHINE AND NALOXONE 1 FILM: 12; 3 FILM, SOLUBLE BUCCAL; SUBLINGUAL at 20:23

## 2025-06-08 RX ADMIN — CLONIDINE HYDROCHLORIDE 0.2 MG: 0.1 TABLET ORAL at 01:27

## 2025-06-08 RX ADMIN — DIAZEPAM 10 MG: 5 TABLET ORAL at 01:22

## 2025-06-08 ASSESSMENT — ACTIVITIES OF DAILY LIVING (ADL)
ADLS_ACUITY_SCORE: 15
LAUNDRY: UNABLE TO COMPLETE
ADLS_ACUITY_SCORE: 15
ORAL_HYGIENE: INDEPENDENT
ADLS_ACUITY_SCORE: 15
HYGIENE/GROOMING: INDEPENDENT
HYGIENE/GROOMING: INDEPENDENT
LAUNDRY: UNABLE TO COMPLETE
ORAL_HYGIENE: INDEPENDENT
ADLS_ACUITY_SCORE: 15
DRESS: INDEPENDENT
ADLS_ACUITY_SCORE: 15
DRESS: INDEPENDENT
ADLS_ACUITY_SCORE: 15

## 2025-06-08 NOTE — PLAN OF CARE
"Triage & Transition Services, [address]     Case Management Encounter:   Writer briefly met with the patient, who remained in bed with a blanket covering his head throughout the interaction. When asked about his current treatment goals, the patient stated that he did not have insurance and was present solely for detoxification. When asked whether having insurance would influence his willingness to engage in treatment programming, the patient responded, \"No, it's all bullshit anyway and doesn't do anything for me.\" Patient appeared withdrawn and minimally engaged during the encounter.    Patient reports initiating alcohol use at age 17. His longest sustained period of sobriety was approximately 18 months, occurring in 2016. He states that he resumed alcohol use approximately two months ago following a brief period of sobriety lasting several days.    Insurance:   Patient reportedly does not have insurance and is open to having the billing office assist with MA application.     Legal Status:   Orders Placed This Encounter      Voluntary     SUDs Assessment Status:   Not needed - may reevaluate if able to obtain medical insurance.     Safety Plan Status:  Patient advised to complete.    ROIs on file:   None on file.    Living Situation:   Patient is currently residing with his mother.     Current Providers, Supports & Collateral:    PCP: Hiram Horner    Current Plan/Referral Status:   Patient does not currently express interest in programming or outpatient follow-up. If at any point the patient indicates a desire to pursue treatment or establish care with outpatient providers, appropriate referrals and coordination of care will be initiated.        Taylor Mcgee, LPCC, LADC, LPC ([place])  [address] - Adult Inpatient Addiction Psychiatry Unit   "

## 2025-06-08 NOTE — DISCHARGE INSTRUCTIONS
Behavioral Discharge Planning and Instructions  THANK YOU FOR CHOOSING [facility]  3A  [phone]    Summary: You were admitted to Station 3A on 6/7/25 for detoxification from alcohol.  A medical exam was performed that included lab work. You have met with a [facility] Counselor and opted to discharge home. Please take care and make your recovery a daily priority, Hemanth It was a pleasure working with you and the entire treatment team here wishes you the very best in your recovery!     Recommendation:    Obtain a ARTURO assessment and follow recommendations.    Referrals for ARTURO assessments:   [facility] Substance Use Disorder Services  [phone]    [place] [facility]  [phone] or [phone]  Rule 25:   [place] [facility]:  [phone]  [facility]:  [phone]    Main Diagnoses:    Alcohol Use Disorder, severe, in withdrawal, complicated by depression and anxiety  Opioid use disorder, in remission, on MAT  Nicotine Use Disorder  Generalized anxiety disorder  Major depressive disorder   Drug-induced insomnia    Major Treatments, Procedures and Findings:  You were treated for alcohol detoxification using [facility] protocol. You have met with a [facility] counselor to develop a treatment plan for discharge. You had labs drawn and those results were reviewed with you. Please take a copy of your lab work with you to your next primary care provider appointment.    Symptoms to Report:  If you experience more anxiety, confusion, sleeplessness, deep sadness or thoughts of suicide, notify your treatment team or notify your primary care provider. IF ANY OF THE SYMPTOMS YOU ARE EXPERIENCING ARE A MEDICAL EMERGENCY CALL 911 IMMEDIATELY.     Lifestyle Adjustment: Health Action Plan:  1.Create a daily schedule  2. Eat Healthy  3. Plan Enjoyable Sober Activities  4. Use Problem Solving Skills and Deal with Issues as they Arise.   5. Be Physically Active  6. Take your medications as prescribed  7. Get enough restful  sleep  8. Practice Relaxation  9. Spend time with Supportive People  10. No use of alcohol, illegal drugs or addictive medications other than what is currently prescribed.   11.AA, NA Sponsor are excellent resources for support  12. Explore how  you can utilize spirituality in your recovery  Disposition: Home    Facts about COVID19 at www.cdc.gov/COVID19 and www.[place].gov/covid19    Keeping hands clean is one of the most important steps we can take to avoid getting sick and spreading germs to others.  Please wash your hands frequently and lather with soap for at least 20 seconds!    Follow-up Appointment:   You are aware you should make a follow up appointment with your primary care doctor for medical and medication management   Date/time *** with Hiram Horner PA-C   [address] [address]   [address]  [phone]    Recovery apps for your phone for educational purposes and to locate in person and zoom recovery meetings  Everything AA -  maximino is a great resource  12 Step Toolkit - educational purpose learning about the 12 steps to recovery  [employer] - meeting maximino  AA  - meeting maximino  Meeting guide - meeting maximino  Quick NA meeting - meeting maximino  Adiel- has various apps    Patient Navigation Hub  [facility] Navigators work to be your point-of-contact for trustworthy and compassionate care from Inpatient services to [facility] Programmatic Care. We will provide resources and communication to help guide you into programmatic care. Ultimately, our goal is to be the one-stop-shop of communication, coordination, and support for your journey to programmatic care.  Phone: [phone]    Resources:  AA/NA meetings have returned to in-person or a hybrid combination of zoom/in-person therefore please check online to verify*  Need Support Now? If you or someone you know is struggling or in crisis, help is available. Call or text 988 or chat  "988BUKAline.org  AA meetings search for them at: https://aa-intergroup.org (worldwide meeting listings)  AA meetings for [place] area can be found online at: https://a[place].org (click local online meetings listings)  NA meetings for [place] area can be found online at: https://www.nam[place].org  (click find a meeting)  AA and NA Sponsors are excellent resources for support and you can find one at any support group meeting.   Alcoholics Anonymous (https://aa.org/): for information 24 hours/day  AA Intergroup service office in [place] (http://www.aastpaul.org/) [phone]  AA Intergroup service office in [facility]: [phone]. (http://www.aaZealCore Embedded SolutionsneaVivatyis.org/)  Narcotics Anonymous (www.[place].org) (241) 581-7123  https://aafairviewriverside.org/meetings  SMART Recovery - self management for addiction recovery:  www.CarCareKioskrecovery.org  Pathways ~ A Health Crisis Resource & Support Center:  414.560.5232.  https://prescribetoprevent.org/patient-education/videos/  http://www.harmreduction.org  Crisis Text Line  Text 753074  You will be connected with a trained live crisis counselor to provide support. Por espanol, texto  PATEL a 937424 o texto a 442-AYUDAME en WhatsApp  [facility] Hope Line  1.800.SUICIDE [1940793]  [facility] [phone]  Support Group:  AA/NA and Sponsor/support.  Fast Tracker  Linking people to mental health and substance use disorder resources  [employer] LightingtrackM[employer] Capital Investmentsn.org   [place] Mental Health Warm Line  Peer to peer support  Monday thru Saturday, 12 pm to 10 pm  627.733.8642 or 3.372.522.1620  Text \"Support\" to 29637  National [place] on Mental Illness (MYRTLE)  267.366.9782 or 1.888.MYRTLE.HELPS  Alcoholics Anonymous (www.alcoholics-anonymous.org): Check your phone book for your local chapter.  Suicide Awareness Voices of Education (SAVE) (www.save.org): 024-227-XNHS (7283)  National Suicide Prevention Line (www.mentalhealthmn.org): 429-312-HBBF (8415)  Mental Health " Consumer/Survivor Network of [place] (www.mhcsn.net): 491.434.7097 or 892-064-7726  Mental Health Association of [place] (www.mentalhealth.org): 901.570.6168 or 304-882-8256   Substance Abuse and Mental Health Services (www.samhsa.gov)  [place] Opioid Prevention Coalition: www.opioidcoalition.org    [place] Recovery Connection (MRC)  [facility] connects people seeking recovery to resources that help foster and sustain long-term recovery.  Whether you are seeking resources for treatment, transportation, housing, job training, education, health care or other pathways to recovery, [facility] is a great place to start.  829.227.2552.  www.minnesotarecOngagey.org    Great Pod casts for nutrition and wellness  Listen on Apple Podcasts  Dishing Up Nutrition   [employer]   Nutrition       Understand the connection between what you eat and how you feel. Hosted by licensed nutritionists and dietitians from [employer] we share practical, real-life solutions for healthier living through nutrition.     General Medication Instructions:   See your medication sheet(s) for instructions.   Take all medications as prescribed.  Make no changes unless your primary care provider suggests them.   Go to all your primary care provider visits.  Be sure to have all your required lab tests. This way, your medicines can be refilled on time.  Do not use any forms of alcohol.    Please Note: If you have any questions at anytime after you discharged please call [facility] [place] detox unit 3A at [phone].    Here are a list of additional numbers you can call if you are wanting to resume services through [facility]:  [facility] [place] Assessment Intake: [phone]  [facility] [place] Adult ARTURO Intensive Outpatient  call: [phone]  [facility] Admissions [phone]    Recovery Clinic call: [phone]  [facility]: [phone]  Medical Records call: [phone]  Billing  Department call: [phone]    Please remember to take all of your behavioral discharge planning and lab paperwork to any follow up appointments, it contains your lab results, diagnosis, medication list and discharge recommendations.      THANK YOU FOR [facility]OSING [facility]

## 2025-06-08 NOTE — PLAN OF CARE
"  Problem: Alcohol Withdrawal  Goal: Alcohol Withdrawal Symptom Control  Outcome: Progressing    Patient is alert and oriented x 4, able to communicate needs appropriately and denied pain. Patient spent most of the evening resting in his room, affect calm with cooperative mood. Patient irritable about being on this unit\" we have nothing to do around here, no activities to keep us busy\", patient demanded to know his discharge time and date. When writer tried to explain the discharge procedure patient got very angry and upset\" you people don't understand what I'm going through being here, you have the freedom to leave after your shift but I don't\", patient requested the 72 hrs intent form but returned it after reading through it. Patient denied SI/SIB/HI and hallucination, medication compliant and contract for safety. Patient had no assaultive or aggressive behavior.     MSSA 4 and 2.                        "

## 2025-06-08 NOTE — PLAN OF CARE
Problem: Alcohol Withdrawal  Goal: Alcohol Withdrawal Symptom Control  Outcome: Progressing     Goal Outcome Evaluation:    Pt was being monitored throughout the night for alcohol withdrawal symptoms. Scored 9 and 6,received PRN's valium once and clonidine for anxiety. Appeared to have slept 7 hours.

## 2025-06-08 NOTE — PLAN OF CARE
Problem: Alcohol Withdrawal  Goal: Alcohol Withdrawal Symptom Control  6/8/2025 1214 by Summer Reid RN  Outcome: Progressing   Goal Outcome Evaluation:    Plan of Care Reviewed With: patient      Patient is A& O x 4, flat blunted affect , depressed mood. Patient went back to bed after breakfast did not take his meds at 0800. Writer went to assess patient, stated that wanted to sleep.  Denies SI/HI/ SIB, contracts for safety.    MSSA= 6, not medicated   /104  HR 96  Temp 98.3  Resp  16 SATs 98% at RA    1200 Assessment:  Patient ate lunch 100%, encouraged fluids. Patient took his morning meds at 1130. After he had lunch he went back to bed. Pt is withdrawn and has minimal interaction with peers.  MSSA=5, not medicated  Staff will continue to monitor and update changes.  BP (!) 144/95 (BP Location: Left arm)   Pulse 89   Temp 97.1  F (36.2  C) (Temporal)   Resp 16   SpO2 98%

## 2025-06-08 NOTE — PLAN OF CARE
Problem: Alcohol Withdrawal  Goal: Alcohol Withdrawal Symptom Control  6/7/2025 1927 by Summer Reid, RN  Outcome: Progressing   Goal Outcome Evaluation:    Plan of Care Reviewed With: patient    Patient is A&O x 4, flat affect, anxious moods. Endorsed anxiety  8/10, denies depression. Patient is tachy , tremulous. Nausea resolved. Patient was florencio to eat dinner. Encouraged fluids.Patient is in milieu interacting  with peers while watching TV.  Pt requested for a bed change because roommate was snoring. Patient was move to  317-1  MSSA= 10, Valium 10 mg given for withdrawal sx    BP (!) 133/91 (BP Location: Left arm)   Pulse 114   Temp 97.5  F (36.4  C) (Temporal)   Resp 16   SpO2 98%     2000 Assessment:  Pt's tremors are improving, still tachy, endorsing anxiety 7/10 and depression 9/10. PRN Hydroxyzine 50 mg given at 2040  MSSA=9, Valium 10 mg given for withdrawal sx.   BP (!) 149/82 (BP Location: Left arm, Patient Position: Sitting, Cuff Size: Adult Regular)   Pulse 110   Temp 98.3  F (36.8  C) (Oral)   Resp 16   SpO2 99%   PRN nicotine Lozenges given twice today.  Writer took patient's medications, was sleeping and snoring way more than the roommate he transferred to. Pt was woken up for his 2200 scheduled medications, he refused the meds stating he's sleeping.

## 2025-06-09 VITALS
TEMPERATURE: 97.8 F | SYSTOLIC BLOOD PRESSURE: 159 MMHG | RESPIRATION RATE: 16 BRPM | HEART RATE: 79 BPM | OXYGEN SATURATION: 99 % | DIASTOLIC BLOOD PRESSURE: 100 MMHG

## 2025-06-09 PROCEDURE — 99239 HOSP IP/OBS DSCHRG MGMT >30: CPT | Performed by: NURSE PRACTITIONER

## 2025-06-09 PROCEDURE — 250N000012 HC RX MED GY IP 250 OP 636 PS 637: Performed by: REGISTERED NURSE

## 2025-06-09 PROCEDURE — 250N000013 HC RX MED GY IP 250 OP 250 PS 637: Performed by: PSYCHIATRY & NEUROLOGY

## 2025-06-09 RX ORDER — MULTIPLE VITAMINS W/ MINERALS TAB 9MG-400MCG
1 TAB ORAL DAILY
Qty: 30 TABLET | Refills: 1 | Status: ON HOLD | OUTPATIENT
Start: 2025-06-09 | End: 2025-06-14

## 2025-06-09 RX ORDER — QUETIAPINE FUMARATE 50 MG/1
50 TABLET, FILM COATED ORAL AT BEDTIME
Status: ON HOLD
Start: 2025-06-09 | End: 2025-06-14

## 2025-06-09 RX ORDER — FOLIC ACID 1 MG/1
1 TABLET ORAL DAILY
Qty: 30 TABLET | Refills: 1 | Status: ON HOLD | OUTPATIENT
Start: 2025-06-09 | End: 2025-06-14

## 2025-06-09 RX ORDER — LANOLIN ALCOHOL/MO/W.PET/CERES
100 CREAM (GRAM) TOPICAL DAILY
Qty: 30 TABLET | Refills: 1 | Status: ON HOLD | OUTPATIENT
Start: 2025-06-09 | End: 2025-06-14

## 2025-06-09 RX ADMIN — FOLIC ACID 1 MG: 1 TABLET ORAL at 08:50

## 2025-06-09 RX ADMIN — BUPRENORPHINE AND NALOXONE 1 FILM: 12; 3 FILM, SOLUBLE BUCCAL; SUBLINGUAL at 08:49

## 2025-06-09 RX ADMIN — THIAMINE HCL TAB 100 MG 100 MG: 100 TAB at 08:50

## 2025-06-09 RX ADMIN — Medication 1 TABLET: at 08:50

## 2025-06-09 RX ADMIN — SERTRALINE HYDROCHLORIDE 100 MG: 100 TABLET ORAL at 08:50

## 2025-06-09 ASSESSMENT — ACTIVITIES OF DAILY LIVING (ADL)
ADLS_ACUITY_SCORE: 19
ADLS_ACUITY_SCORE: 15
ADLS_ACUITY_SCORE: 15
ADLS_ACUITY_SCORE: 19
ADLS_ACUITY_SCORE: 15
ORAL_HYGIENE: INDEPENDENT
ADLS_ACUITY_SCORE: 15
HYGIENE/GROOMING: INDEPENDENT
ADLS_ACUITY_SCORE: 15
DRESS: INDEPENDENT
ADLS_ACUITY_SCORE: 15
ADLS_ACUITY_SCORE: 15

## 2025-06-09 NOTE — PROVIDER NOTIFICATION
06/09/25 0600   Sleep/Rest   Sleep/Rest/Relaxation no problem identified   Sleep Hygiene Promotion noise level reduced;reading encouraged;regular sleep pattern promoted;relaxation techniques promoted;room lighting adjusted;sleep diary encouraged   Night Time # Hours 7 hours     MSSA score was 3       Patient's eyes were closed, respirations regular, no indication made to staff of being awake.      No complaints/concerns voiced. Patient appeared to have slept most of the night.

## 2025-06-09 NOTE — DISCHARGE SUMMARY
Psychiatric Discharge Summary    Hemanth Hurd MRN# [id number]   Age: 29 year old YOB: 1995     Date of Admission:  6/6/2025  Date of Discharge:  6/9/2025  Admitting Physician:  Oj Ramirez MD  Discharge Physician:  YANICK Mason CNP (Contact: [phone])         Event Leading to Hospitalization:      From H&P 6/7/2025:    This patient is a 29 year old male with a history of alcohol use disorder, opioid use disorder on MAT, benzodiazepine use disorder, generalized anxiety disorder, and drug-induced insomnia who presented to [facility] ED seeking detox from alcohol. Family history significant for alcohol use, cannabis use, and opiate use. Patient was admitted on a voluntary basis to Station 3A detox. Patient was started on MSSA protocol using Valium. Patient reports a history of hallucinations and tremors with alcohol cessation. He denies a history of withdrawal seizures. Thiamine, folic acid, and multivitamin were ordered on admission. IM consult placed to address acute medical concerns. Symptoms and presentation at this time are most consistent with Alcohol Use Disorder, severe, in withdrawal, complicated by depression and anxiety. I have discussed the risks, benefits, and alternatives of Zoloft, which patient was taking prior to admission. Patient reported benefit from Zoloft and agrees to continue this medication. Patient will likely benefit from CD treatment upon discharge. CTC will be meeting with patient to discuss dispo plan. Patient agrees to  consider anti-craving medication prior to discharge. Inpatient psychiatric hospitalization is warranted at this time for safety, stabilization, and possible adjustment in medications.     Per ED Provider Note dated 6/6/2025:  Hemanth Hurd is a 29 year old male with a past medical history of severe alcohol use disorder, opioid use disorder, EMILY, and drug induced insomnia, who presents to the ED seeking detox from alcohol.   The patient states that he resumed drinking alcohol 2 months ago.  He states he drinks a liter of vodka per day.  He becomes tremulous and has hallucinated in the past with attempted alcohol cessation.  Patient denies his [sic] alcohol withdrawal seizure.  He denies any recent fall.  He does state that he did scrape his left shin a couple days ago.  He denies difficulty with weightbearing.  He denies any depression or suicide ideation.  Patient denies any recent illness or medical concerns.  His tetanus imitation is up-to-date according to [facility].  Patient denies other substance use.     Assessment & Plan   29 year old male with history of alcohol dependence to the emergency department seeking detox.  He has been drinking alcohol daily for the past 2 months.  He describes symptoms of alcohol withdrawal with attempt at alcohol cessation.  Patient does not have any acute medical concerns.  His labs are remarkable for mild anion gap acidosis that is likely related to alcohol related ketosis.  He has a mild leukocytosis but no infectious symptoms.  His breathalyzer is elevated to 0.258.  He does not have any acute mental health concerns.  Patient initiated on MSSA.  He was given IV normal saline as well as a D5 normal saline to address his anion gap acidosis.  He was periodically tachycardic in the emergency department the EKG was either sinus rhythm or sinus tachycardia (rate 102).  No clinical evidence for significant arrhythmia.  He is asymptomatic here in the emergency department.  He appears medically stable for detox admission     Per my interview with patient:  Patient reports he began drinking alcohol at age 17.  His longest period of sobriety was 18 months in 2016.  Patient reports he resumed drinking alcohol approximately 2 months ago after being sober for several days.  He has been drinking 1-1.5 liters of vodka daily.  Alcohol breath test in the ED was 0.258.  U-Tox was remarkable for benzodiazepines.  Patient  received Valium in the ED.  Patient reports tremors and hallucinations in the past with alcohol cessation.  He denies a history of alcohol withdrawal seizures.  He has a history of blackouts.  Patient reports completing approximately 6 detoxes in the past.  He has a history of CD treatment.  Most recent treatment was at [facility] in December 2024.  Patient reports previously taking Campral and naltrexone.  He denies benefit from these medications.  He is willing to consider initiation of an anticraving medication prior to discharge.    Depression:   Reports: depressed mood, changes in sleep, decreased energy, irritability.   Denies: suicidal ideation, decreased interest, changes in appetite, guilt, hopelessness, helplessness, impaired concentration.   Ashlee:   Denies: sleeplessness, increased goal-directed activities, abrupt increase in energy, pressured speech  Psychosis:   Denies: visual hallucinations, auditory hallucinations, paranoia, grandiosity, ideas of reference, thought insertions, thought broadcasting.  Anxiety:   Reports: excessive worries that are difficult to control for the past 6 months  Denies:  panic attacks  PTSD:   Denies: re-experiencing past trauma, nightmares, increased arousal, avoidance of traumatic stimuli, impaired function.  OCD:   Denies: obsessions, checking, symmetry, cleaning, skin picking.  ED:   Denies: restriction, binging, purging.    See full admission note by YANICK Ugalde, CNP 6/7/2025 for details.           Diagnoses:     Alcohol use disorder, severe, dependence  Alcohol withdrawal, resolved  Opioid use disorder, in remission, on MAT  Nicotine use disorder  Generalized anxiety disorder  Major depressive disorder, mild  Drug-induced insomnia  Leukocytosis         Labs:      Latest Reference Range & Units 06/06/25 21:33 06/06/25 21:43 06/07/25 00:50 06/07/25 02:05   Sodium 135 - 145 mmol/L  140     Potassium 3.4 - 5.3 mmol/L  See Comment 3.9    Chloride 98 - 107  mmol/L  99     Carbon Dioxide (CO2) 22 - 29 mmol/L  18 (L)     Urea Nitrogen 6.0 - 20.0 mg/dL  8.3     Creatinine 0.67 - 1.17 mg/dL  0.74     GFR Estimate >60 mL/min/1.73m2  >90     Calcium 8.8 - 10.4 mg/dL  9.2     Anion Gap 7 - 15 mmol/L  23 (H)     Magnesium 1.7 - 2.3 mg/dL  1.9     Albumin 3.5 - 5.2 g/dL  4.6     Protein Total 6.4 - 8.3 g/dL  8.3     Alkaline Phosphatase 40 - 150 U/L  See Comment 85    ALT 0 - 70 U/L  See Comment 46    AST 0 - 45 U/L  See Comment 43    Bilirubin Total <=1.2 mg/dL  0.2     GGT 8 - 61 U/L   75 (H)    Glucose 70 - 99 mg/dL  115 (H)     TSH 0.30 - 4.20 uIU/mL   1.59    WBC 4.0 - 11.0 10e3/uL  12.5 (H)     Hemoglobin 13.3 - 17.7 g/dL  15.2     Hematocrit 40.0 - 53.0 %  44.6     Platelet Count 150 - 450 10e3/uL  283     RBC Count 4.40 - 5.90 10e6/uL  5.13     MCV 78 - 100 fL  87     MCH 26.5 - 33.0 pg  29.6     MCHC 31.5 - 36.5 g/dL  34.1     RDW 10.0 - 15.0 %  14.0     % Neutrophils %  71     % Lymphocytes %  21     % Monocytes %  7     % Eosinophils %  0     % Basophils %  1     % Immature Granulocytes %  0     NRBC/W <1 /100  0     Absolute Neutrophil 1.6 - 8.3 10e3/uL  8.8 (H)     Absolute Lymphocytes 0.8 - 5.3 10e3/uL  2.7     Absolute Monocytes 0.0 - 1.3 10e3/uL  0.9     Absolute Eosinophils 0.0 - 0.7 10e3/uL  0.0     Absolute Basophils 0.0 - 0.2 10e3/uL  0.1     Absolute Immature Granulocytes <=0.4 10e3/uL  0.0     Absolute NRBCs 10e3/uL  0.0     Alcohol Breath Test 0.00 - 0.01  0.258 !      Amphetamine Qual Urine Screen Negative     Screen Negative   Fentanyl Qual Urine Screen Negative     Screen Negative   Cocaine Urine Screen Negative     Screen Negative   Benzodiazepine Urine Screen Negative     Screen Positive !   Opiates Qualitative Urine Screen Negative     Screen Negative   PCP Urine Screen Negative     Screen Negative   Cannabinoids Qual Urine Screen Negative     Screen Negative   Barbiturates Qual Urine Screen Negative     Screen Negative          Consults:      Internal Medicine Consult 6/7/2025:    Please refer to H&P by YANICK Bocanegra CNP, dated 6/7/25 for full details. In brief, Hemanth Hurd is a 29 year old yo male with hx of depression, anxiety, OUD and etoh use disorder, admitted to IP psychiatric detox unit (3A) after presenting to ED with etoh abuse and desire for medically supervised etoh withdrawal. Internal medicine consulted for medical co-management.      Currently, pt admits to feeling slightly better since admission. Denies fevers, chills, chest pain, SOB, nausea, abd pain, bowel and bladder concerns.     GEN: In NAD  LUNGS: Tachy yet regular, no murmurs appreciated  CV: RRR  ABD: +BSs; SNTND  EXT: No BLE edema  NEURO: AAOx3; CNs grossly intact; No acute focal deficits noted.      Assessment & Plan    Hemanth Hurd is a 29 year old yo male with hx of depression, anxiety, OUD and etoh use disorder, admitted to IP psychiatric detox unit (3A) after presenting to ED with etoh abuse and desire for medically supervised etoh withdrawal. Internal medicine consulted for medical co-management.      # Etoh use disorder and sz withdrawal ppx: Management per psychiatry primary team.      # Recurrent tachycardia and elevated BPs: Most likely due to etoh withdrawal and anxiety. EKG in ED with sinus tachy and otherwise normal.   - Prn po hydralazine ordered with parameters to give  - Continue to monitor     # Leukocytosis: Initial WBC here 12.5, most likely due to stress as pt afebrile and he denies having any sxs of underlying infection.   - Follow up with PCP after discharge.      The patient's care was discussed with the Patient and communicated to primary team via this note. Medicine signing off         Hospital Course:     Hemanth Hurd was admitted to Station 3A [place] with attending Oj Ramirez MD as a voluntary patient. The patient was placed under status 15 (15 minute checks) to ensure patient safety.     The MSSA was initiated  "due to alcohol use disorder and concern for withdrawal.  He received 10 mg of Valium on day 1, 45 mg of Valium on day 2 and 10 mg of Valium on day 3.  Thereafter his withdrawal subsided, and the MSSA was discontinued.     All prior to admission medications were continued with the exception of Provigil.  He declined MAT for alcohol use disorder    Hemanth Hurd did participate in groups and was visible in the milieu.  He was intermittently irritable during interactions with staff.  He reported his mood was \"fine.\"  He ate and slept well.  He stated he was considering attending outpatient CD treatment.  He plans to resume AA meetings.      Hemanth Hurd was discharged to home.  At the time of discharge Hemanth Hurd was determined to not be a danger to himself or others.          Discharge Medications:      Details   folic acid (FOLVITE) 1 MG tablet Take 1 tablet (1 mg) by mouth daily.  Qty: 30 tablet, Refills: 1    Associated Diagnoses: Alcohol use disorder      multivitamin w/minerals (THERA-VIT-M) tablet Take 1 tablet by mouth daily.  Qty: 30 tablet, Refills: 1    Associated Diagnoses: Alcohol use disorder      thiamine (B-1) 100 MG tablet Take 1 tablet (100 mg) by mouth daily.  Qty: 30 tablet, Refills: 1    Associated Diagnoses: Alcohol use disorder        QUEtiapine (SEROQUEL) 50 MG tablet Take 1 tablet (50 mg) by mouth at bedtime.    Associated Diagnoses: Opioid use disorder, severe, dependence (H)        Buprenorphine HCl-Naloxone HCl (SUBOXONE) 12-3 MG FILM per film Place 1 Film under the tongue 2 times daily.  Qty: 60 Film, Refills: 1    Comments: Pt to use GoodRx  Associated Diagnoses: Opioid use disorder, severe, dependence (H)      cloNIDine (CATAPRES) 0.1 MG tablet Take 1-2 tablets (0.1-0.2 mg) by mouth 2 times daily as needed (withdrawal symptoms, anxiety).  Qty: 120 tablet, Refills: 3    Associated Diagnoses: EMILY (generalized anxiety disorder); Current moderate episode of major " "depressive disorder, unspecified whether recurrent (H)      gabapentin (NEURONTIN) 300 MG capsule Take 3-4 capsules (900-1,200 mg) by mouth at bedtime.  Qty: 270 capsule, Refills: 3    Associated Diagnoses: Current moderate episode of major depressive disorder, unspecified whether recurrent (H); EMILY (generalized anxiety disorder)      modafinil (PROVIGIL) 100 MG tablet Take 100 mg by mouth daily as needed (narcolepsy).      sertraline (ZOLOFT) 100 MG tablet Take 1 tablet (100 mg) by mouth daily  Qty: 90 tablet, Refills: 3    Associated Diagnoses: EMILY (generalized anxiety disorder); Current moderate episode of major depressive disorder, unspecified whether recurrent (H)      naloxone (NARCAN) 4 MG/0.1ML nasal spray 4 mg once as needed for opioid reversal.                Psychiatric Examination:     Appearance:  awake, alert, adequately groomed, and dressed in hospital scrubs  Attitude:  cooperative  Eye Contact:  fair  Mood:  \"fine\"  Affect:  intensity is blunted  Speech:  clear, coherent  Psychomotor Behavior:  no evidence of tardive dyskinesia, dystonia, or tics  Thought Process:  linear  Associations:  no loose associations  Thought Content:  no evidence of suicidal ideation or homicidal ideation and no evidence of psychotic thought  Insight:  fair  Judgment:  fair  Oriented to:  date, time, person, and place  Attention Span and Concentration:  intact  Recent and Remote Memory:  intact  Language:  intact, fluent English  Fund of Knowledge: appropriate  Muscle Strength and Tone: normal  Gait and Station:  normal    /78 (BP Location: Left arm, Patient Position: Supine, Cuff Size: Adult Regular)   Pulse 68   Temp 97  F (36.1  C) (Oral)   Resp 16   SpO2 97%          Discharge Plan:     Per Discharge AVS:    Summary: You were admitted to Station 3A on 6/7/25 for detoxification from alcohol.  A medical exam was performed that included lab work. You have met with a [facility] Counselor and opted to discharge home. " Please take care and make your recovery a daily priority, Hemanth It was a pleasure working with you and the entire treatment team here wishes you the very best in your recovery!     Recommendation:    Obtain a ARTURO assessment and follow recommendations.    Referrals for ARTURO assessments:   [facility]  [phone]    [place] [facility]  [phone] or [phone]  [address]:   [place]:  [phone]  [address]:  [phone]    Major Treatments, Procedures and Findings:  You were treated for alcohol detoxification using [facility] protocol. You have met with a [facility] counselor to develop a treatment plan for discharge. You had labs drawn and those results were reviewed with you. Please take a copy of your lab work with you to your next primary care provider appointment.    Symptoms to Report:  If you experience more anxiety, confusion, sleeplessness, deep sadness or thoughts of suicide, notify your treatment team or notify your primary care provider. IF ANY OF THE SYMPTOMS YOU ARE EXPERIENCING ARE A MEDICAL EMERGENCY CALL 911 IMMEDIATELY.     Lifestyle Adjustment: Health Action Plan:  1.Create a daily schedule  2. Eat Healthy  3. Plan Enjoyable Sober Activities  4. Use Problem Solving Skills and Deal with Issues as they Arise.   5. Be Physically Active  6. Take your medications as prescribed  7. Get enough restful sleep  8. Practice Relaxation  9. Spend time with Supportive People  10. No use of alcohol, illegal drugs or addictive medications other than what is currently prescribed.   11.AA, NA Sponsor are excellent resources for support  12. Explore how  you can utilize spirituality in your recovery  Disposition: Home    Follow-up Appointment:   You are aware you should make a follow up appointment with your primary care doctor for medical and medication management  Hiram Horner PA-C   [facility]  [address] [place] [address]   [address]  [phone]        Attestation:  The patient has been seen and evaluated by me, YANICK Mason CNP  Discharge time > 30 minutes

## 2025-06-09 NOTE — PLAN OF CARE
Behavioral Team Discussion: (6/9/2025)    Continued Stay Criteria/Rationale: Patient admitted for Chemical Use Issues.  Plan: The following services will be provided to the patient; psychiatric assessment, medication management, therapeutic milieu, individual and group support, and skills groups.   Participants: 3A Provider: Dr. Oj Ramirez MD; 3A RN: Summer Reid RN; 3A CM's: HAIDER Noriega.  Summary/Recommendation: Providers will assess today for treatment recommendations, discharge planning, and aftercare plans. CM will meet with pt for discharge planning.   Medical/Physical: Pt  endorses hx of DT, Pt  denies hx of seizures. Pt denied any medical concerns.   Precautions:   Behavioral Orders   Procedures    Code 1 - Restrict to Unit    Routine Programming     As clinically indicated    Status 15     Every 15 minutes.     Rationale for change in precautions or plan: N/A  Progress: Initial.    ASAM Dimension Scale Ratings:  Dimension 1: 2 Client has some difficulty tolerating and coping with withdrawal discomfort. Client's intoxication may be severe, but responds to support and treatment such that the client does not immediately endanger self or others. Client displays moderate signs and symptoms with moderate risk of severe withdrawal.  Dimension 2: 1 Client tolerates and melyssa with physical discomfort and is able to get the services that the client needs.  Dimension 3: 2 Client has difficulty with impulse control and lacks coping skills. Client has thoughts of suicide or harm to others without means; however, the thoughts may interfere with participation in some treatment activities. Client has difficulty functioning in significant life areas. Client has moderate symptoms of emotional, behavioral, or cognitive problems. Client is able to participate in most treatment activities.  Dimension 4: 2 Client displays verbal compliance, but lacks consistent behaviors; has low motivation for change; and is passively  involved in treatment.  Dimension 5: 4 No awareness of the negative impact of mental health problems or substance abuse. No coping skills to arrest mental health or addiction illnesses, or prevent relapse.  Dimension 6: 4 Client has (A) Chronically antagonistic significant other, living environment, family, peer group or long-term criminal justice involvement that is harmful to recovery or treatment progress, or (B) Client has an actively antagonistic significant other, family, work, or living environment with immediate threat to the client's safety and well-being.

## 2025-06-09 NOTE — PROGRESS NOTES
[address]     Daily Encounter: Met with team, discussed patient progress, discharge plan and any impediments to discharge.    Pt is OOD and will be discharged home today with ARTURO resources if he decides.Pt declined to complete Safety Plan with writer.    Insurance: MA     Legal Status:  Vol       SUDs Assessment Status: Not needed     ROIs on file: None     Living Situation:  Patient is currently residing with his mother.     Current Providers, Supports & Collateral:  PCP: Hiram Horner      Current Plan/Referral Status: Detox only    Safety Plan Status: pt declined to complete the safety plan      HAIDER Noriega  [facility]

## 2025-06-09 NOTE — PLAN OF CARE
Goal Outcome Evaluation:       Discharge Note:      Patient alert and oriented x 3. Patient cleared to d/c home by Rosaline.  BS present and active, passing flatus LBM today. Patient voiding well without difficulty. Patient denies pain. Discharge instructions and medication reviewed with patient and he verbalized understanding.    Patient is out of detox monitoring status.  All patient belongings from room, locker, and security sent with patient.    This RN went over discharge instructions, teachings, patient's labs, and unit recommendations with patient. This RN went over patient's prescribed medications being sent home with patient from the discharge pharmacy. Patient verbalizes and demonstrates understanding of all teachings. Patient verbalizes understanding of medication instructions and indications. Patient denies thoughts of harm towards self or others. Patient denies suicidal ideation, plan or intent. Patient denies access to guns. Patient denies auditory/visual hallucinations. Pt denies any current medication side effects or medical issues at this time.     Patient encouraged to make a follow-up appointment with Primary Care Provider within one week of discharge.     Patient denies any further questions and is now discharged at this time.   Patient discharged at 11:00 to Home.    Patient escorted off the unit by Psych associate, [place].

## 2025-06-12 ENCOUNTER — PATIENT OUTREACH (OUTPATIENT)
Dept: CARE COORDINATION | Facility: CLINIC | Age: 30
End: 2025-06-12
Payer: MEDICAID

## 2025-06-12 ENCOUNTER — TELEPHONE (OUTPATIENT)
Dept: BEHAVIORAL HEALTH | Facility: CLINIC | Age: 30
End: 2025-06-12

## 2025-06-12 ENCOUNTER — HOSPITAL ENCOUNTER (EMERGENCY)
Facility: CLINIC | Age: 30
End: 2025-06-12
Attending: FAMILY MEDICINE
Payer: MEDICAID

## 2025-06-12 VITALS
BODY MASS INDEX: 32.64 KG/M2 | DIASTOLIC BLOOD PRESSURE: 114 MMHG | SYSTOLIC BLOOD PRESSURE: 158 MMHG | OXYGEN SATURATION: 97 % | TEMPERATURE: 98.5 F | WEIGHT: 241 LBS | HEIGHT: 72 IN | RESPIRATION RATE: 16 BRPM | HEART RATE: 86 BPM

## 2025-06-12 DIAGNOSIS — F10.229 ALCOHOL DEPENDENCE WITH INTOXICATION WITH COMPLICATION (H): ICD-10-CM

## 2025-06-12 DIAGNOSIS — F10.220 ALCOHOL DEPENDENCE WITH UNCOMPLICATED INTOXICATION (H): Primary | ICD-10-CM

## 2025-06-12 DIAGNOSIS — F41.1 GAD (GENERALIZED ANXIETY DISORDER): ICD-10-CM

## 2025-06-12 DIAGNOSIS — F10.90 ALCOHOL USE DISORDER: ICD-10-CM

## 2025-06-12 DIAGNOSIS — F11.20 OPIOID USE DISORDER, SEVERE, DEPENDENCE (H): ICD-10-CM

## 2025-06-12 DIAGNOSIS — F10.21 ALCOHOL USE DISORDER, SEVERE, IN EARLY REMISSION (H): ICD-10-CM

## 2025-06-12 DIAGNOSIS — F32.1 CURRENT MODERATE EPISODE OF MAJOR DEPRESSIVE DISORDER, UNSPECIFIED WHETHER RECURRENT (H): ICD-10-CM

## 2025-06-12 LAB
ALBUMIN SERPL BCG-MCNC: 4.3 G/DL (ref 3.5–5.2)
ALCOHOL BREATH TEST: 0.19 (ref 0–0.01)
ALP SERPL-CCNC: 130 U/L (ref 40–150)
ALT SERPL W P-5'-P-CCNC: 194 U/L (ref 0–70)
AMPHETAMINES UR QL SCN: ABNORMAL
ANION GAP SERPL CALCULATED.3IONS-SCNC: 19 MMOL/L (ref 7–15)
AST SERPL W P-5'-P-CCNC: 108 U/L (ref 0–45)
ATRIAL RATE - MUSE: 102 BPM
ATRIAL RATE - MUSE: 97 BPM
BARBITURATES UR QL SCN: ABNORMAL
BASOPHILS # BLD AUTO: 0.1 10E3/UL (ref 0–0.2)
BASOPHILS NFR BLD AUTO: 1 %
BENZODIAZ UR QL SCN: ABNORMAL
BILIRUB SERPL-MCNC: <0.2 MG/DL
BUN SERPL-MCNC: 12.3 MG/DL (ref 6–20)
BZE UR QL SCN: ABNORMAL
CALCIUM SERPL-MCNC: 8.8 MG/DL (ref 8.8–10.4)
CANNABINOIDS UR QL SCN: ABNORMAL
CHLORIDE SERPL-SCNC: 100 MMOL/L (ref 98–107)
CREAT SERPL-MCNC: 0.79 MG/DL (ref 0.67–1.17)
DIASTOLIC BLOOD PRESSURE - MUSE: NORMAL MMHG
DIASTOLIC BLOOD PRESSURE - MUSE: NORMAL MMHG
EGFRCR SERPLBLD CKD-EPI 2021: >90 ML/MIN/1.73M2
EOSINOPHIL # BLD AUTO: 0.1 10E3/UL (ref 0–0.7)
EOSINOPHIL NFR BLD AUTO: 1 %
ERYTHROCYTE [DISTWIDTH] IN BLOOD BY AUTOMATED COUNT: 13.5 % (ref 10–15)
ETHANOL SERPL-MCNC: 0.09 G/DL
FENTANYL UR QL: ABNORMAL
GLUCOSE SERPL-MCNC: 103 MG/DL (ref 70–99)
HCO3 SERPL-SCNC: 20 MMOL/L (ref 22–29)
HCT VFR BLD AUTO: 39.8 % (ref 40–53)
HGB BLD-MCNC: 13.6 G/DL (ref 13.3–17.7)
IMM GRANULOCYTES # BLD: 0 10E3/UL
IMM GRANULOCYTES NFR BLD: 0 %
INTERPRETATION ECG - MUSE: NORMAL
INTERPRETATION ECG - MUSE: NORMAL
LYMPHOCYTES # BLD AUTO: 2.5 10E3/UL (ref 0.8–5.3)
LYMPHOCYTES NFR BLD AUTO: 34 %
MAGNESIUM SERPL-MCNC: 1.8 MG/DL (ref 1.7–2.3)
MCH RBC QN AUTO: 29.6 PG (ref 26.5–33)
MCHC RBC AUTO-ENTMCNC: 34.2 G/DL (ref 31.5–36.5)
MCV RBC AUTO: 87 FL (ref 78–100)
MONOCYTES # BLD AUTO: 0.7 10E3/UL (ref 0–1.3)
MONOCYTES NFR BLD AUTO: 9 %
NEUTROPHILS # BLD AUTO: 4 10E3/UL (ref 1.6–8.3)
NEUTROPHILS NFR BLD AUTO: 55 %
NRBC # BLD AUTO: 0 10E3/UL
NRBC BLD AUTO-RTO: 0 /100
OPIATES UR QL SCN: ABNORMAL
P AXIS - MUSE: 42 DEGREES
P AXIS - MUSE: 58 DEGREES
PCP QUAL URINE (ROCHE): ABNORMAL
PLATELET # BLD AUTO: 195 10E3/UL (ref 150–450)
POTASSIUM SERPL-SCNC: 3.7 MMOL/L (ref 3.4–5.3)
PR INTERVAL - MUSE: 160 MS
PR INTERVAL - MUSE: 162 MS
PROT SERPL-MCNC: 7.4 G/DL (ref 6.4–8.3)
QRS DURATION - MUSE: 92 MS
QRS DURATION - MUSE: 94 MS
QT - MUSE: 346 MS
QT - MUSE: 352 MS
QTC - MUSE: 447 MS
QTC - MUSE: 450 MS
R AXIS - MUSE: -19 DEGREES
R AXIS - MUSE: -9 DEGREES
RBC # BLD AUTO: 4.59 10E6/UL (ref 4.4–5.9)
SODIUM SERPL-SCNC: 139 MMOL/L (ref 135–145)
SYSTOLIC BLOOD PRESSURE - MUSE: NORMAL MMHG
SYSTOLIC BLOOD PRESSURE - MUSE: NORMAL MMHG
T AXIS - MUSE: 14 DEGREES
T AXIS - MUSE: 4 DEGREES
VENTRICULAR RATE- MUSE: 102 BPM
VENTRICULAR RATE- MUSE: 97 BPM
WBC # BLD AUTO: 7.4 10E3/UL (ref 4–11)

## 2025-06-12 PROCEDURE — 999N000285 HC STATISTIC VASC ACCESS LAB DRAW WITH PIV START

## 2025-06-12 PROCEDURE — HZ2ZZZZ DETOXIFICATION SERVICES FOR SUBSTANCE ABUSE TREATMENT: ICD-10-PCS | Performed by: PSYCHIATRY & NEUROLOGY

## 2025-06-12 PROCEDURE — 80307 DRUG TEST PRSMV CHEM ANLYZR: CPT | Performed by: FAMILY MEDICINE

## 2025-06-12 PROCEDURE — 99285 EMERGENCY DEPT VISIT HI MDM: CPT | Performed by: FAMILY MEDICINE

## 2025-06-12 PROCEDURE — 82077 ASSAY SPEC XCP UR&BREATH IA: CPT | Performed by: FAMILY MEDICINE

## 2025-06-12 PROCEDURE — 85004 AUTOMATED DIFF WBC COUNT: CPT | Performed by: FAMILY MEDICINE

## 2025-06-12 PROCEDURE — 250N000011 HC RX IP 250 OP 636: Performed by: FAMILY MEDICINE

## 2025-06-12 PROCEDURE — 83036 HEMOGLOBIN GLYCOSYLATED A1C: CPT | Performed by: PSYCHIATRY & NEUROLOGY

## 2025-06-12 PROCEDURE — 83735 ASSAY OF MAGNESIUM: CPT | Performed by: FAMILY MEDICINE

## 2025-06-12 PROCEDURE — 82075 ASSAY OF BREATH ETHANOL: CPT | Performed by: FAMILY MEDICINE

## 2025-06-12 PROCEDURE — 250N000013 HC RX MED GY IP 250 OP 250 PS 637: Performed by: FAMILY MEDICINE

## 2025-06-12 PROCEDURE — 82465 ASSAY BLD/SERUM CHOLESTEROL: CPT | Performed by: PSYCHIATRY & NEUROLOGY

## 2025-06-12 PROCEDURE — 82310 ASSAY OF CALCIUM: CPT | Performed by: FAMILY MEDICINE

## 2025-06-12 PROCEDURE — 82977 ASSAY OF GGT: CPT | Performed by: PSYCHIATRY & NEUROLOGY

## 2025-06-12 PROCEDURE — 99285 EMERGENCY DEPT VISIT HI MDM: CPT | Mod: 25 | Performed by: FAMILY MEDICINE

## 2025-06-12 PROCEDURE — 84443 ASSAY THYROID STIM HORMONE: CPT | Performed by: PSYCHIATRY & NEUROLOGY

## 2025-06-12 PROCEDURE — 999N000127 HC STATISTIC PERIPHERAL IV START W US GUIDANCE

## 2025-06-12 RX ORDER — ONDANSETRON 2 MG/ML
4 INJECTION INTRAMUSCULAR; INTRAVENOUS ONCE
Status: COMPLETED | OUTPATIENT
Start: 2025-06-12 | End: 2025-06-12

## 2025-06-12 RX ORDER — DIAZEPAM 10 MG/2ML
5 INJECTION, SOLUTION INTRAMUSCULAR; INTRAVENOUS ONCE
Status: COMPLETED | OUTPATIENT
Start: 2025-06-12 | End: 2025-06-12

## 2025-06-12 RX ORDER — DIAZEPAM 5 MG/1
5-20 TABLET ORAL EVERY 30 MIN PRN
Status: DISCONTINUED | OUTPATIENT
Start: 2025-06-12 | End: 2025-06-13

## 2025-06-12 RX ADMIN — DIAZEPAM 5 MG: 5 INJECTION INTRAMUSCULAR; INTRAVENOUS at 23:45

## 2025-06-12 RX ADMIN — DIAZEPAM 5 MG: 5 TABLET ORAL at 23:01

## 2025-06-12 RX ADMIN — ONDANSETRON 4 MG: 2 INJECTION INTRAMUSCULAR; INTRAVENOUS at 23:40

## 2025-06-12 ASSESSMENT — ACTIVITIES OF DAILY LIVING (ADL)
ADLS_ACUITY_SCORE: 45

## 2025-06-12 ASSESSMENT — COLUMBIA-SUICIDE SEVERITY RATING SCALE - C-SSRS
2. HAVE YOU ACTUALLY HAD ANY THOUGHTS OF KILLING YOURSELF IN THE PAST MONTH?: NO
6. HAVE YOU EVER DONE ANYTHING, STARTED TO DO ANYTHING, OR PREPARED TO DO ANYTHING TO END YOUR LIFE?: NO
1. IN THE PAST MONTH, HAVE YOU WISHED YOU WERE DEAD OR WISHED YOU COULD GO TO SLEEP AND NOT WAKE UP?: NO

## 2025-06-12 NOTE — PROGRESS NOTES
Connected Care Resource Center:   [facility] Resource Center Contact  [facility]/Voicemail     Clinical Data: Post-Discharge Outreach     Outreach attempted x 2.  Left message on patient's voicemail, providing [facility]'s central phone number of [phone] ([phone]) for questions/concerns and/or to schedule an appt with an [facility] provider, if they do not have a PCP.      Plan:  [facility] will do no further outreaches at this time.       SHANNON Rodriguez  Connected Care Resource [place], [facility]    *Connected Care Resource Team does NOT follow patient ongoing. Referrals are identified based on internal discharge reports and the outreach is to ensure patient has an understanding of their discharge instructions.

## 2025-06-12 NOTE — ED TRIAGE NOTES
Denies any hx of withdrawal seizure or DT's     Triage Assessment (Adult)       Row Name 06/12/25 0765          Triage Assessment    Airway WDL WDL        Respiratory WDL    Respiratory WDL WDL        Skin Circulation/Temperature WDL    Skin Circulation/Temperature WDL WDL        Cardiac WDL    Cardiac WDL WDL        Peripheral/Neurovascular WDL    Peripheral Neurovascular WDL WDL        Cognitive/Neuro/Behavioral WDL    Cognitive/Neuro/Behavioral WDL WDL

## 2025-06-12 NOTE — ED PROVIDER NOTES
"    [facility] EMERGENCY DEPARTMENT ([facility])    6/12/25      ED PROVIDER NOTE      History     Chief Complaint   Patient presents with    Alcohol Intoxication     Here fro detox from alcohol, drinks a liter of vodka daily, last drink 30 mins ago      HPI  Hemanth Hurd is a 29 year old male with history of alcohol use disorder with withdrawal symptoms, opoid use disorder on MAT, benzodiazepine use disorder, EMILY, drug-induced insomnia, who presents to the ED seeking alcohol detox.    {History Review Selection (Optional):655777}  {ROS Selection (Optional):363794}    Physical Exam   BP: (!) 183/101  Pulse: 109  Temp: 98.4  F (36.9  C)  Resp: 16  Height: 182.9 cm (6')  Weight: 109.3 kg (241 lb)  SpO2: 98 %  Physical Exam  ***    ED Course, Procedures, & Data      Procedures       {ED Course Selections (Optional):746102}  {ED Sepsis CMS Documentation (Optional):076307::\" \"}       Results for orders placed or performed during the hospital encounter of 06/12/25   Alcohol breath test POCT     Status: Abnormal   Result Value Ref Range    Alcohol Breath Test 0.189 (A) 0.00 - 0.01     Medications - No data to display  Labs Ordered and Resulted from Time of ED Arrival to Time of ED Departure   ALCOHOL BREATH TEST POCT - Abnormal       Result Value    Alcohol Breath Test 0.189 (*)      No orders to display          {Critical Care Performed?:341061}    Assessment & Plan    ***    I have reviewed the nursing notes. I have reviewed the findings, diagnosis, plan and need for follow up with the patient.    New Prescriptions    No medications on file       Final diagnoses:   None       Tian Esquivel***  [facility] EMERGENCY DEPARTMENT  6/12/2025  "   HENT:      Head: Atraumatic.   Eyes:      General: No scleral icterus.     Conjunctiva/sclera: Conjunctivae normal.   Cardiovascular:      Rate and Rhythm: Normal rate.      Heart sounds: Normal heart sounds.   Pulmonary:      Effort: Pulmonary effort is normal. No respiratory distress.      Breath sounds: Normal breath sounds.   Abdominal:      Palpations: Abdomen is soft.      Tenderness: There is no abdominal tenderness.   Musculoskeletal:         General: No deformity.      Cervical back: Neck supple.   Skin:     General: Skin is warm.   Neurological:      General: No focal deficit present.      Mental Status: He is alert and oriented to person, place, and time.      Sensory: No sensory deficit.      Motor: No weakness.      Coordination: Coordination normal.   Psychiatric:         Mood and Affect: Mood is anxious.         Speech: Speech normal.         Behavior: Behavior is cooperative.         Thought Content: Thought content does not include homicidal or suicidal ideation.           ED Course, Procedures, & Data      Procedures       Results for orders placed or performed during the hospital encounter of 06/12/25   Comprehensive metabolic panel     Status: Abnormal   Result Value Ref Range    Sodium 139 135 - 145 mmol/L    Potassium 3.7 3.4 - 5.3 mmol/L    Carbon Dioxide (CO2) 20 (L) 22 - 29 mmol/L    Anion Gap 19 (H) 7 - 15 mmol/L    Urea Nitrogen 12.3 6.0 - 20.0 mg/dL    Creatinine 0.79 0.67 - 1.17 mg/dL    GFR Estimate >90 >60 mL/min/1.73m2    Calcium 8.8 8.8 - 10.4 mg/dL    Chloride 100 98 - 107 mmol/L    Glucose 103 (H) 70 - 99 mg/dL    Alkaline Phosphatase 130 40 - 150 U/L     (H) 0 - 45 U/L     (H) 0 - 70 U/L    Protein Total 7.4 6.4 - 8.3 g/dL    Albumin 4.3 3.5 - 5.2 g/dL    Bilirubin Total <0.2 <=1.2 mg/dL   Magnesium     Status: Normal   Result Value Ref Range    Magnesium 1.8 1.7 - 2.3 mg/dL   Ethanol Level Blood     Status: Abnormal   Result Value Ref Range    Ethanol Level Blood  0.09 (H) <=0.01 g/dL   CBC with platelets and differential     Status: Abnormal   Result Value Ref Range    WBC Count 7.4 4.0 - 11.0 10e3/uL    RBC Count 4.59 4.40 - 5.90 10e6/uL    Hemoglobin 13.6 13.3 - 17.7 g/dL    Hematocrit 39.8 (L) 40.0 - 53.0 %    MCV 87 78 - 100 fL    MCH 29.6 26.5 - 33.0 pg    MCHC 34.2 31.5 - 36.5 g/dL    RDW 13.5 10.0 - 15.0 %    Platelet Count 195 150 - 450 10e3/uL    % Neutrophils 55 %    % Lymphocytes 34 %    % Monocytes 9 %    % Eosinophils 1 %    % Basophils 1 %    % Immature Granulocytes 0 %    NRBCs per 100 WBC 0 <1 /100    Absolute Neutrophils 4.0 1.6 - 8.3 10e3/uL    Absolute Lymphocytes 2.5 0.8 - 5.3 10e3/uL    Absolute Monocytes 0.7 0.0 - 1.3 10e3/uL    Absolute Eosinophils 0.1 0.0 - 0.7 10e3/uL    Absolute Basophils 0.1 0.0 - 0.2 10e3/uL    Absolute Immature Granulocytes 0.0 <=0.4 10e3/uL    Absolute NRBCs 0.0 10e3/uL   Urine Drug Screen Panel     Status: Abnormal   Result Value Ref Range    Amphetamines Urine Screen Negative Screen Negative    Barbituates Urine Screen Negative Screen Negative    Benzodiazepine Urine Screen Positive (A) Screen Negative    Cannabinoids Urine Screen Negative Screen Negative    Cocaine Urine Screen Negative Screen Negative    Fentanyl Qual Urine Screen Negative Screen Negative    Opiates Urine Screen Negative Screen Negative    PCP Urine Screen Negative Screen Negative   Alcohol breath test POCT     Status: Abnormal   Result Value Ref Range    Alcohol Breath Test 0.189 (A) 0.00 - 0.01   Urine Drug Screen     Status: Abnormal    Narrative    The following orders were created for panel order Urine Drug Screen.  Procedure                               Abnormality         Status                     ---------                               -----------         ------                     Urine Drug Screen Panel[[id number]]     Abnormal            Final result                 Please view results for these tests on the individual orders.   CBC with  platelets differential     Status: Abnormal    Narrative    The following orders were created for panel order CBC with platelets differential.  Procedure                               Abnormality         Status                     ---------                               -----------         ------                     CBC with platelets and ...[[id number]]  Abnormal            Final result                 Please view results for these tests on the individual orders.     Medications   LORazepam (ATIVAN) tablet 1-4 mg (has no administration in time range)   thiamine (B-1) tablet 100 mg (has no administration in time range)   folic acid (FOLVITE) tablet 1 mg (has no administration in time range)   multivitamin w/minerals (THERA-VIT-M) tablet 1 tablet (has no administration in time range)   loperamide (IMODIUM) capsule 2 mg (has no administration in time range)   ondansetron (ZOFRAN ODT) ODT tab 4 mg (has no administration in time range)   atenolol (TENORMIN) tablet 50 mg (has no administration in time range)   acetaminophen (TYLENOL) tablet 650 mg (has no administration in time range)   alum & mag hydroxide-simethicone (MAALOX) suspension 30 mL (has no administration in time range)   senna-docusate (SENOKOT-S/PERICOLACE) 8.6-50 MG per tablet 1 tablet (has no administration in time range)   traZODone (DESYREL) tablet 50 mg (50 mg Oral $Given 6/13/25 0131)   hydrOXYzine HCl (ATARAX) tablet 25 mg (25 mg Oral $Given 6/13/25 0130)   OLANZapine (zyPREXA) tablet 10 mg (has no administration in time range)     Or   OLANZapine (zyPREXA) injection 10 mg (has no administration in time range)   nicotine (NICORETTE) gum 2 mg (has no administration in time range)   ondansetron (ZOFRAN) injection 4 mg (4 mg Intravenous $Given 6/12/25 2340)   diazepam (VALIUM) injection 5 mg (5 mg Intravenous $Given 6/12/25 2345)     Labs Ordered and Resulted from Time of ED Arrival to Time of ED Departure   COMPREHENSIVE METABOLIC PANEL - Abnormal        Result Value    Sodium 139      Potassium 3.7      Carbon Dioxide (CO2) 20 (*)     Anion Gap 19 (*)     Urea Nitrogen 12.3      Creatinine 0.79      GFR Estimate >90      Calcium 8.8      Chloride 100      Glucose 103 (*)     Alkaline Phosphatase 130       (*)      (*)     Protein Total 7.4      Albumin 4.3      Bilirubin Total <0.2     ETHANOL LEVEL BLOOD - Abnormal    Ethanol Level Blood 0.09 (*)    CBC WITH PLATELETS AND DIFFERENTIAL - Abnormal    WBC Count 7.4      RBC Count 4.59      Hemoglobin 13.6      Hematocrit 39.8 (*)     MCV 87      MCH 29.6      MCHC 34.2      RDW 13.5      Platelet Count 195      % Neutrophils 55      % Lymphocytes 34      % Monocytes 9      % Eosinophils 1      % Basophils 1      % Immature Granulocytes 0      NRBCs per 100 WBC 0      Absolute Neutrophils 4.0      Absolute Lymphocytes 2.5      Absolute Monocytes 0.7      Absolute Eosinophils 0.1      Absolute Basophils 0.1      Absolute Immature Granulocytes 0.0      Absolute NRBCs 0.0     URINE DRUG SCREEN PANEL - Abnormal    Amphetamines Urine Screen Negative      Barbituates Urine Screen Negative      Benzodiazepine Urine Screen Positive (*)     Cannabinoids Urine Screen Negative      Cocaine Urine Screen Negative      Fentanyl Qual Urine Screen Negative      Opiates Urine Screen Negative      PCP Urine Screen Negative     ALCOHOL BREATH TEST POCT - Abnormal    Alcohol Breath Test 0.189 (*)    MAGNESIUM - Normal    Magnesium 1.8       No orders to display          Critical care was not performed.         Medical Decision Making  The patient's presentation was of high complexity (a chronic illness severe exacerbation, progression, or side effect of treatment).    The patient's evaluation involved:  ordering and/or review of 3+ test(s) in this encounter (see separate area of note for details)    The patient's management necessitated moderate risk (prescription drug management including medications given in the ED),  moderate risk (limitations due to social determinants of health (substance use)), high risk (a parenteral controlled substance), and high risk (a decision regarding hospitalization).   Assessment & Plan        I have reviewed the nursing notes. I have reviewed the findings, diagnosis, plan and need for follow up with the patient.        Final diagnoses:   Alcohol dependence with intoxication with complication (H)       Tian Esquivel  [facility] EMERGENCY DEPARTMENT  6/12/2025     Tian Esquivel MD  06/13/25 0135

## 2025-06-13 PROBLEM — F10.239 ALCOHOL DEPENDENCE WITH WITHDRAWAL WITH COMPLICATION (H): Status: ACTIVE | Noted: 2025-06-13

## 2025-06-13 PROBLEM — F10.229 ALCOHOL DEPENDENCE WITH INTOXICATION WITH COMPLICATION (H): Status: ACTIVE | Noted: 2025-06-13

## 2025-06-13 LAB
ALBUMIN SERPL BCG-MCNC: 4.1 G/DL (ref 3.5–5.2)
ALP SERPL-CCNC: 112 U/L (ref 40–150)
ALT SERPL W P-5'-P-CCNC: 165 U/L (ref 0–70)
ANION GAP SERPL CALCULATED.3IONS-SCNC: 15 MMOL/L (ref 7–15)
AST SERPL W P-5'-P-CCNC: 83 U/L (ref 0–45)
BILIRUB SERPL-MCNC: 0.3 MG/DL
BUN SERPL-MCNC: 12.9 MG/DL (ref 6–20)
CALCIUM SERPL-MCNC: 8.7 MG/DL (ref 8.8–10.4)
CHLORIDE SERPL-SCNC: 101 MMOL/L (ref 98–107)
CHOLEST SERPL-MCNC: 232 MG/DL
CREAT SERPL-MCNC: 0.85 MG/DL (ref 0.67–1.17)
EGFRCR SERPLBLD CKD-EPI 2021: >90 ML/MIN/1.73M2
EST. AVERAGE GLUCOSE BLD GHB EST-MCNC: 108 MG/DL
GGT SERPL-CCNC: 201 U/L (ref 8–61)
GLUCOSE SERPL-MCNC: 117 MG/DL (ref 70–99)
HBA1C MFR BLD: 5.4 %
HCO3 SERPL-SCNC: 25 MMOL/L (ref 22–29)
HDLC SERPL-MCNC: 48 MG/DL
HOLD SPECIMEN: NORMAL
LDLC SERPL CALC-MCNC: ABNORMAL MG/DL
NONHDLC SERPL-MCNC: 184 MG/DL
POTASSIUM SERPL-SCNC: 3.7 MMOL/L (ref 3.4–5.3)
PROT SERPL-MCNC: 6.9 G/DL (ref 6.4–8.3)
SODIUM SERPL-SCNC: 141 MMOL/L (ref 135–145)
TRIGL SERPL-MCNC: 446 MG/DL
TSH SERPL DL<=0.005 MIU/L-ACNC: 0.83 UIU/ML (ref 0.3–4.2)

## 2025-06-13 PROCEDURE — 128N000004 HC R&B CD ADULT

## 2025-06-13 PROCEDURE — 250N000013 HC RX MED GY IP 250 OP 250 PS 637: Performed by: FAMILY MEDICINE

## 2025-06-13 PROCEDURE — 82310 ASSAY OF CALCIUM: CPT | Performed by: PSYCHIATRY & NEUROLOGY

## 2025-06-13 PROCEDURE — 36415 COLL VENOUS BLD VENIPUNCTURE: CPT | Performed by: PSYCHIATRY & NEUROLOGY

## 2025-06-13 PROCEDURE — 250N000012 HC RX MED GY IP 250 OP 636 PS 637: Performed by: PSYCHIATRY & NEUROLOGY

## 2025-06-13 PROCEDURE — 99207 PR NO BILLABLE SERVICE THIS VISIT: CPT

## 2025-06-13 PROCEDURE — 99223 1ST HOSP IP/OBS HIGH 75: CPT | Performed by: PSYCHIATRY & NEUROLOGY

## 2025-06-13 PROCEDURE — 250N000013 HC RX MED GY IP 250 OP 250 PS 637: Performed by: STUDENT IN AN ORGANIZED HEALTH CARE EDUCATION/TRAINING PROGRAM

## 2025-06-13 PROCEDURE — 250N000013 HC RX MED GY IP 250 OP 250 PS 637: Performed by: PSYCHIATRY & NEUROLOGY

## 2025-06-13 RX ORDER — POLYETHYLENE GLYCOL 3350 17 G
4 POWDER IN PACKET (EA) ORAL
Status: DISCONTINUED | OUTPATIENT
Start: 2025-06-13 | End: 2025-06-13

## 2025-06-13 RX ORDER — HYDROXYZINE HYDROCHLORIDE 25 MG/1
25 TABLET, FILM COATED ORAL EVERY 4 HOURS PRN
Status: DISCONTINUED | OUTPATIENT
Start: 2025-06-13 | End: 2025-06-13

## 2025-06-13 RX ORDER — BUPRENORPHINE AND NALOXONE 12; 3 MG/1; MG/1
1 FILM, SOLUBLE BUCCAL; SUBLINGUAL 2 TIMES DAILY
Status: DISCONTINUED | OUTPATIENT
Start: 2025-06-13 | End: 2025-06-14 | Stop reason: HOSPADM

## 2025-06-13 RX ORDER — FOLIC ACID 1 MG/1
1 TABLET ORAL DAILY
Status: DISCONTINUED | OUTPATIENT
Start: 2025-06-13 | End: 2025-06-14 | Stop reason: HOSPADM

## 2025-06-13 RX ORDER — OLANZAPINE 10 MG/2ML
10 INJECTION, POWDER, FOR SOLUTION INTRAMUSCULAR 3 TIMES DAILY PRN
Status: DISCONTINUED | OUTPATIENT
Start: 2025-06-13 | End: 2025-06-14 | Stop reason: HOSPADM

## 2025-06-13 RX ORDER — HYDROXYZINE HYDROCHLORIDE 25 MG/1
25 TABLET, FILM COATED ORAL EVERY 4 HOURS PRN
Status: DISCONTINUED | OUTPATIENT
Start: 2025-06-13 | End: 2025-06-14 | Stop reason: HOSPADM

## 2025-06-13 RX ORDER — GABAPENTIN 300 MG/1
900 CAPSULE ORAL AT BEDTIME
Status: DISCONTINUED | OUTPATIENT
Start: 2025-06-13 | End: 2025-06-14 | Stop reason: HOSPADM

## 2025-06-13 RX ORDER — CLONIDINE HYDROCHLORIDE 0.1 MG/1
0.1 TABLET ORAL 2 TIMES DAILY PRN
Status: DISCONTINUED | OUTPATIENT
Start: 2025-06-13 | End: 2025-06-14 | Stop reason: HOSPADM

## 2025-06-13 RX ORDER — SERTRALINE HYDROCHLORIDE 100 MG/1
100 TABLET, FILM COATED ORAL DAILY
Status: DISCONTINUED | OUTPATIENT
Start: 2025-06-13 | End: 2025-06-14 | Stop reason: HOSPADM

## 2025-06-13 RX ORDER — LOPERAMIDE HYDROCHLORIDE 2 MG/1
2 CAPSULE ORAL 4 TIMES DAILY PRN
Status: DISCONTINUED | OUTPATIENT
Start: 2025-06-13 | End: 2025-06-14 | Stop reason: HOSPADM

## 2025-06-13 RX ORDER — AMOXICILLIN 250 MG
1 CAPSULE ORAL 2 TIMES DAILY PRN
Status: DISCONTINUED | OUTPATIENT
Start: 2025-06-13 | End: 2025-06-14 | Stop reason: HOSPADM

## 2025-06-13 RX ORDER — QUETIAPINE FUMARATE 50 MG/1
50 TABLET, FILM COATED ORAL AT BEDTIME
Status: DISCONTINUED | OUTPATIENT
Start: 2025-06-13 | End: 2025-06-14 | Stop reason: HOSPADM

## 2025-06-13 RX ORDER — MAGNESIUM HYDROXIDE/ALUMINUM HYDROXICE/SIMETHICONE 120; 1200; 1200 MG/30ML; MG/30ML; MG/30ML
30 SUSPENSION ORAL EVERY 4 HOURS PRN
Status: DISCONTINUED | OUTPATIENT
Start: 2025-06-13 | End: 2025-06-14 | Stop reason: HOSPADM

## 2025-06-13 RX ORDER — ONDANSETRON 4 MG/1
4 TABLET, ORALLY DISINTEGRATING ORAL EVERY 6 HOURS PRN
Status: DISCONTINUED | OUTPATIENT
Start: 2025-06-13 | End: 2025-06-14 | Stop reason: HOSPADM

## 2025-06-13 RX ORDER — LORAZEPAM 1 MG/1
1-4 TABLET ORAL EVERY 30 MIN PRN
Status: DISCONTINUED | OUTPATIENT
Start: 2025-06-13 | End: 2025-06-13

## 2025-06-13 RX ORDER — ACETAMINOPHEN 325 MG/1
650 TABLET ORAL EVERY 4 HOURS PRN
Status: DISCONTINUED | OUTPATIENT
Start: 2025-06-13 | End: 2025-06-14 | Stop reason: HOSPADM

## 2025-06-13 RX ORDER — GABAPENTIN 400 MG/1
1200 CAPSULE ORAL AT BEDTIME
Status: DISCONTINUED | OUTPATIENT
Start: 2025-06-13 | End: 2025-06-14 | Stop reason: HOSPADM

## 2025-06-13 RX ORDER — MULTIPLE VITAMINS W/ MINERALS TAB 9MG-400MCG
1 TAB ORAL DAILY
Status: DISCONTINUED | OUTPATIENT
Start: 2025-06-13 | End: 2025-06-14 | Stop reason: HOSPADM

## 2025-06-13 RX ORDER — ATENOLOL 50 MG/1
50 TABLET ORAL DAILY PRN
Status: DISCONTINUED | OUTPATIENT
Start: 2025-06-13 | End: 2025-06-14 | Stop reason: HOSPADM

## 2025-06-13 RX ORDER — DIAZEPAM 5 MG/1
5-20 TABLET ORAL EVERY 30 MIN PRN
Status: DISCONTINUED | OUTPATIENT
Start: 2025-06-13 | End: 2025-06-14 | Stop reason: HOSPADM

## 2025-06-13 RX ORDER — CLONIDINE HYDROCHLORIDE 0.2 MG/1
0.2 TABLET ORAL 2 TIMES DAILY PRN
Status: DISCONTINUED | OUTPATIENT
Start: 2025-06-13 | End: 2025-06-14 | Stop reason: HOSPADM

## 2025-06-13 RX ORDER — TRAZODONE HYDROCHLORIDE 50 MG/1
50 TABLET ORAL
Status: DISCONTINUED | OUTPATIENT
Start: 2025-06-13 | End: 2025-06-14 | Stop reason: HOSPADM

## 2025-06-13 RX ORDER — OLANZAPINE 10 MG/1
10 TABLET, FILM COATED ORAL 3 TIMES DAILY PRN
Status: DISCONTINUED | OUTPATIENT
Start: 2025-06-13 | End: 2025-06-14 | Stop reason: HOSPADM

## 2025-06-13 RX ADMIN — BUPRENORPHINE AND NALOXONE 1 FILM: 12; 3 FILM, SOLUBLE BUCCAL; SUBLINGUAL at 20:37

## 2025-06-13 RX ADMIN — QUETIAPINE FUMARATE 50 MG: 50 TABLET ORAL at 22:06

## 2025-06-13 RX ADMIN — NICOTINE POLACRILEX 4 MG: 4 GUM, CHEWING BUCCAL at 17:52

## 2025-06-13 RX ADMIN — DIAZEPAM 10 MG: 5 TABLET ORAL at 11:47

## 2025-06-13 RX ADMIN — THIAMINE HCL TAB 100 MG 100 MG: 100 TAB at 08:32

## 2025-06-13 RX ADMIN — NICOTINE POLACRILEX 8 MG: 4 GUM, CHEWING BUCCAL at 22:06

## 2025-06-13 RX ADMIN — DIAZEPAM 10 MG: 5 TABLET ORAL at 08:31

## 2025-06-13 RX ADMIN — NICOTINE POLACRILEX 4 MG: 4 GUM, CHEWING BUCCAL at 19:06

## 2025-06-13 RX ADMIN — DIAZEPAM 10 MG: 5 TABLET ORAL at 00:56

## 2025-06-13 RX ADMIN — LORAZEPAM 2 MG: 1 TABLET ORAL at 04:55

## 2025-06-13 RX ADMIN — BUPRENORPHINE AND NALOXONE 1 FILM: 12; 3 FILM, SOLUBLE BUCCAL; SUBLINGUAL at 08:31

## 2025-06-13 RX ADMIN — SERTRALINE HYDROCHLORIDE 100 MG: 100 TABLET ORAL at 08:32

## 2025-06-13 RX ADMIN — CLONIDINE HYDROCHLORIDE 0.2 MG: 0.2 TABLET ORAL at 20:37

## 2025-06-13 RX ADMIN — Medication 1 TABLET: at 08:31

## 2025-06-13 RX ADMIN — ACETAMINOPHEN 650 MG: 325 TABLET ORAL at 16:37

## 2025-06-13 RX ADMIN — NICOTINE POLACRILEX 4 MG: 4 GUM, CHEWING BUCCAL at 16:40

## 2025-06-13 RX ADMIN — GABAPENTIN 900 MG: 300 CAPSULE ORAL at 21:06

## 2025-06-13 RX ADMIN — HYDROXYZINE HYDROCHLORIDE 25 MG: 25 TABLET, FILM COATED ORAL at 01:30

## 2025-06-13 RX ADMIN — DIAZEPAM 10 MG: 5 TABLET ORAL at 16:37

## 2025-06-13 RX ADMIN — TRAZODONE HYDROCHLORIDE 50 MG: 50 TABLET ORAL at 01:31

## 2025-06-13 RX ADMIN — NICOTINE POLACRILEX 8 MG: 4 GUM, CHEWING BUCCAL at 21:06

## 2025-06-13 RX ADMIN — FOLIC ACID 1 MG: 1 TABLET ORAL at 08:32

## 2025-06-13 ASSESSMENT — ACTIVITIES OF DAILY LIVING (ADL)
ADLS_ACUITY_SCORE: 19
ADLS_ACUITY_SCORE: 19
DRESS: INDEPENDENT
ADLS_ACUITY_SCORE: 19
HYGIENE/GROOMING: INDEPENDENT
ADLS_ACUITY_SCORE: 45
ADLS_ACUITY_SCORE: 19
ADLS_ACUITY_SCORE: 45
ORAL_HYGIENE: INDEPENDENT
ADLS_ACUITY_SCORE: 45
ADLS_ACUITY_SCORE: 19
HYGIENE/GROOMING: INDEPENDENT
ADLS_ACUITY_SCORE: 19
ORAL_HYGIENE: INDEPENDENT
DRESS: INDEPENDENT
ADLS_ACUITY_SCORE: 19

## 2025-06-13 NOTE — H&P
Psychiatry History and Physical    Hemanth NICK Hurd MRN# [id number]   Age: 29 year old YOB: 1995     Date of Admission:  6/12/2025          Assessment:   This patient is a 29 year old male with history of polysubstance use, depression, anxiety and insomnia who presented to ED seeking detox from alcohol. Medically cleared in ED, admitted to 3A as voluntary patient. Drinking 1L vodka daily since discharging from this unit on 6/9, EtOH JASON 0.09(H), UDS positive for benzodiazepines. MSSA with diazepam started for alcohol withdrawal. Withdrawal precautions in place, denies history of seizures, does report hx of hallucinations with withdrawal, denies current. Admission labs ordered and reviewed those resulted. IM consult placed. Denying safety concerns including SI and HI. PTA medications continued as appropriate including suboxone for OUD MAT. Pt reports goals for hospitalization are to complete detox, considering CD treatment but feeling too unwell at this time to discuss further.      Inpatient psychiatric hospitalization is warranted at this time for safety, stabilization, and possible adjustment in medications.         Diagnoses:     Alcohol use disorder, severe, dependence with withdrawal with complication including hx of hallucinations   Opioid use disorder, in remission, on MAT  Nicotine dependence with withdrawal  Generalized anxiety disorder  Major depressive disorder, recurrent, mild  Drug-induced insomnia  High anion gap metabolic acidosis  Elevated LFTs  HLD  Hypocalcemia     Clinically Significant Risk Factors Present on Admission              # Anion Gap Metabolic Acidosis: Highest Anion Gap = 19 mmol/L in last 2 days, will monitor and treat as appropriate      # Hypertension: Home medication list includes antihypertensive(s)           # Obesity: Estimated body mass index is 32.55 kg/m  as calculated from the following:    Height as of this encounter: 1.829 m (6').    Weight as of this  encounter: 108.9 kg (240 lb).       # Financial/Environmental Concerns:                     Plan:   Psychiatric treatment/inteventions:  Medications:   -MSSA with diazepam, thiamine, folic acid, multivitamin and PRN atenolol for alcohol withdrawal   -continue PTA suboxone 12-3mg BID for OUD MAT  -continue PTA gabapentin 900 or 1200mg at bedtime for sleep, mood  -continue PTA quetiapine 50mg at bedtime for sleep   -continue PTA sertraline 100mg daily   -continue PTA PRN clonidine 0.1 or 0.2mg BID for withdrawal, anxiety (second line)  -PRN hydroxyzine 25mg every 4 hours for anxiety (First line)  -PRN trazodone 50mg at bedtime for sleep   -Nicotine replacement therapy ordered on unit and will offer on discharge and educate patient on benefits of cessation   -consider MAT for AUD prior to discharge, reported no benefit from naltrexone or campral in past   -patient interested in completing detox and considering CD treatment       Laboratory/Imaging: reviewed- EtOH JASON 0.09(H), UDS positive for benzodiazepines; CMP with CO2 20(L), Anion gap 19(H), (H), (H), glucose 103(H) othewrise WNL; Mg WNL; CBC with hematocrit 39.8(L) otherwise WNL; TSH, Lipid panel, GGT ordered to complete admission labs, repeat CMP ordered to trend     Patient will be treated in therapeutic milieu with appropriate individual and group therapies as described.    Medical treatment/interventions:  Medical concerns:   1) Alcohol withdrawal  -MSSA as above  -PRN zofran and imodium for GI distress related to withdrawal   -withdrawal precautions    2) IM consult placed for management of other medical concerns, per consult note on 6/13/25:   Circumstances of recent discharge and re-admittance noted. Please refer to recent medicine consult in chart dated 06/07/25, by Hiram Bucio PA-C.      Diagnoses:  # Etoh use disorder and sz withdrawal ppx    # Recurrent tachycardia and elevated BPs   # Leukocytosis      Vital signs are currently  stable. Labs at this admission are unremarkable.      No medical intervention indicated; medicine will sign off at this time. Thank you for the opportunity to be involved in this patient's care. Please notify on-call [facility] if any intercurrent medical issues arise.      Ranjana Ferraro PA-C  Hospital Medicine [facility]  [facility]    Legal Status: Voluntary    Safety Assessment:   Behavioral Orders   Procedures    Code 1 - Restrict to Unit    Routine Programming     As clinically indicated    Status 15     Every 15 minutes.    Withdrawal precautions      Pt has not required locked seclusion or restraints in the past 24 hours to maintain safety, please refer to RN documentation for further details.    The risks, benefits, alternatives and side effects have been discussed and are understood by the patient.    Disposition: Pending clinical stabilization. Will likely discharge to  tx vs home when stable.    >75 min total time that was spent in counseling and coordination of care with staff, reviewing medical record, educating patient about treatment options, side effects and benefits and alternative treatments for medications, providing supportive therapy and redirection regarding above symptoms.     This document is created with the help of Dragon dictation system.  All grammatical/typing errors or context distortion are unintentional and inherent to software.    Corinna García DO  [facility] Psychiatry         Chief Complaint:     Alcohol withdrawal          History of Present Illness:     Hemanth is a 29 year old male with history of polysubstance use, depression, anxiety and insomnia who presented to ED seeking detox from alcohol.    Chart review completed including ED notes from this encounter; recent 3A admission from 6/6-6/9, pt discharged home, declined AUD MAT; ED visit on 4/7 for alcohol use and hallucinations; no psychiatric admissions noted.     Per ED physician note: Hemanth Hurd  is a 29 year old male with history of alcohol use disorder with withdrawal symptoms, opoid use disorder on MAT, benzodiazepine use disorder, EMILY, drug-induced insomnia, who presents to the ED seeking alcohol detox.  Patient denies history of withdrawal seizures or significant DTs but does note that he is continue to drink vodka nearly a liter a day.     Upon interview: Patient confirms information above, reports he discharged from this unit on the night that he immediately relapsed the same day, has been drinking a liter of vodka per day, denies any other substance use outside of nicotine.  He reports he has been taking his medications since discharge.  He has tolerance to alcohol, drinking more than intended, difficulty cutting down and withdrawal symptoms when he stopped drinking including experiencing some hallucinations after he left here which is what prompted his relapse, also reports currently feeling shaky, sweaty and having some irritability.  He also describes his mood as anxious.  He denies any history of seizures.  Denies any current hallucinations, was having auditory hallucinations last night, denies anything today.  Denies having any SI or HI.  He reports his goals for hospitalization are to complete detox, he is not sure about going to CD treatment as he reports that right now he currently wants to focus on just feeling better, he is open to considering treatment will discuss options with case management staff.            Psychiatric Review of Systems:   Depression:   Reports: poor appetite, poor sleep, low energy, low mood,irritability   Denies: suicidal ideation, decreased interest, guilt, hopelessness, helplessness, impaired concentration  Ashlee:   Reports: none  Denies: sleeplessness, impulsiveness, racing thoughts, increased goal-directed activities, pressured speech, increase in energy  Psychosis:   Reports: recent AH, see HPI  Denies: visual hallucinations, current auditory hallucinations,  paranoia, grandiosity, ideas of reference, thought insertions, thought broadcasting.  Anxiety:   Reports: increased anxiety  Denies:  panic attacks  PTSD:   Reports: none.  Denies: re-experiencing past trauma, nightmares, increased arousal, avoidance of traumatic stimuli, impaired function.  OCD:   Reports: none  Denies: obsessions, checking, symmetry, cleaning, skin picking.  ED:   Reports: none  Denies: restriction, binging, purging, excessive exercise, laxative abuse           Medical Review of Systems:     10 point review of systems is otherwise negative unless noted above.            Psychiatric History:   Psychiatric Hospitalizations: denies  History of Psychosis: hallucinations with withdrawal   Prior ECT: denies  Court Commitment: denies  Suicide Attempts: denies  Self-injurious Behavior: denies  Violence toward others: denies   Use of Psychotropics: has been on sertraline for mood, quetiapine for sleep; taken naltrexone and campral and reported ineffective         Substance Use History:   Alcohol: See HPI, first use age 17, problematic for past 10 years, longest period of sobriety 18 months in 2016  Cannabis: none  Nicotine: daily use   Cocaine: none  Methamphetamine: none  Opiates/Heroin: hx of OUD on suboxone, in remission   Benzodiazepines: hx of problematic BZD use in past per chart  Hallucinogens: none  Inhalants: none      Prior Chemical Dependency treatment: detox x7, residential at [facility] December 2024         Social History:   Upbringing: raised in [place], grew up in [place]   Educational History: bachelors degree  Relationships:single  Children: 1 daughter  Current Living Situation:living with mother  Occupational History: unemployed  Financial Support: limited  Legal History:DUI over 1 month ago  Abuse/Trauma History:none noted         Family History:     H/o completed suicides in family: none  Mental Health- as below  CD- reported significant family hx of alcohol and cannabis use; also father  with opiate use disorder who is currently in prison   Family History   Problem Relation Age of Onset    Rheumatoid Arthritis Mother     Mental Illness Father                Past Medical History:     Past Medical History:   Diagnosis Date    History of alcohol abuse     History of benzodiazepine use     History of opiate therapy        History of seizures: denies          Past Surgical History:     History reviewed. No pertinent surgical history.           Allergies:      No Known Allergies           Medications:   I have reviewed this patient's current medications    Medications Prior to Admission   Medication Sig Dispense Refill Last Dose/Taking    cloNIDine (CATAPRES) 0.1 MG tablet Take 1-2 tablets (0.1-0.2 mg) by mouth 2 times daily as needed (withdrawal symptoms, anxiety). 120 tablet 3 Past Month    Buprenorphine HCl-Naloxone HCl (SUBOXONE) 12-3 MG FILM per film Place 1 Film under the tongue 2 times daily. 60 Film 1     folic acid (FOLVITE) 1 MG tablet Take 1 tablet (1 mg) by mouth daily. 30 tablet 1     gabapentin (NEURONTIN) 300 MG capsule Take 3-4 capsules (900-1,200 mg) by mouth at bedtime. 270 capsule 3     modafinil (PROVIGIL) 100 MG tablet Take 100 mg by mouth daily as needed (narcolepsy).       multivitamin w/minerals (THERA-VIT-M) tablet Take 1 tablet by mouth daily. 30 tablet 1     naloxone (NARCAN) 4 MG/0.1ML nasal spray 4 mg once as needed for opioid reversal.       QUEtiapine (SEROQUEL) 50 MG tablet Take 1 tablet (50 mg) by mouth at bedtime.       sertraline (ZOLOFT) 100 MG tablet Take 1 tablet (100 mg) by mouth daily 90 tablet 3     thiamine (B-1) 100 MG tablet Take 1 tablet (100 mg) by mouth daily. 30 tablet 1              Labs:     Recent Results (from the past 24 hours)   Alcohol breath test POCT    Collection Time: 06/12/25  4:56 PM   Result Value Ref Range    Alcohol Breath Test 0.189 (A) 0.00 - 0.01   Urine Drug Screen Panel    Collection Time: 06/12/25  8:31 PM   Result Value Ref Range     Amphetamines Urine Screen Negative Screen Negative    Barbituates Urine Screen Negative Screen Negative    Benzodiazepine Urine Screen Positive (A) Screen Negative    Cannabinoids Urine Screen Negative Screen Negative    Cocaine Urine Screen Negative Screen Negative    Fentanyl Qual Urine Screen Negative Screen Negative    Opiates Urine Screen Negative Screen Negative    PCP Urine Screen Negative Screen Negative   Comprehensive metabolic panel    Collection Time: 06/12/25  9:23 PM   Result Value Ref Range    Sodium 139 135 - 145 mmol/L    Potassium 3.7 3.4 - 5.3 mmol/L    Carbon Dioxide (CO2) 20 (L) 22 - 29 mmol/L    Anion Gap 19 (H) 7 - 15 mmol/L    Urea Nitrogen 12.3 6.0 - 20.0 mg/dL    Creatinine 0.79 0.67 - 1.17 mg/dL    GFR Estimate >90 >60 mL/min/1.73m2    Calcium 8.8 8.8 - 10.4 mg/dL    Chloride 100 98 - 107 mmol/L    Glucose 103 (H) 70 - 99 mg/dL    Alkaline Phosphatase 130 40 - 150 U/L     (H) 0 - 45 U/L     (H) 0 - 70 U/L    Protein Total 7.4 6.4 - 8.3 g/dL    Albumin 4.3 3.5 - 5.2 g/dL    Bilirubin Total <0.2 <=1.2 mg/dL   Magnesium    Collection Time: 06/12/25  9:23 PM   Result Value Ref Range    Magnesium 1.8 1.7 - 2.3 mg/dL   Ethanol Level Blood    Collection Time: 06/12/25  9:23 PM   Result Value Ref Range    Ethanol Level Blood 0.09 (H) <=0.01 g/dL   CBC with platelets and differential    Collection Time: 06/12/25  9:23 PM   Result Value Ref Range    WBC Count 7.4 4.0 - 11.0 10e3/uL    RBC Count 4.59 4.40 - 5.90 10e6/uL    Hemoglobin 13.6 13.3 - 17.7 g/dL    Hematocrit 39.8 (L) 40.0 - 53.0 %    MCV 87 78 - 100 fL    MCH 29.6 26.5 - 33.0 pg    MCHC 34.2 31.5 - 36.5 g/dL    RDW 13.5 10.0 - 15.0 %    Platelet Count 195 150 - 450 10e3/uL    % Neutrophils 55 %    % Lymphocytes 34 %    % Monocytes 9 %    % Eosinophils 1 %    % Basophils 1 %    % Immature Granulocytes 0 %    NRBCs per 100 WBC 0 <1 /100    Absolute Neutrophils 4.0 1.6 - 8.3 10e3/uL    Absolute Lymphocytes 2.5 0.8 - 5.3 10e3/uL  "   Absolute Monocytes 0.7 0.0 - 1.3 10e3/uL    Absolute Eosinophils 0.1 0.0 - 0.7 10e3/uL    Absolute Basophils 0.1 0.0 - 0.2 10e3/uL    Absolute Immature Granulocytes 0.0 <=0.4 10e3/uL    Absolute NRBCs 0.0 10e3/uL       BP (!) 129/90 (BP Location: Left arm, Patient Position: Supine, Cuff Size: Adult Regular)   Pulse 75   Temp 97.6  F (36.4  C) (Oral)   Resp 16   Ht 1.829 m (6')   Wt 108.9 kg (240 lb)   SpO2 96%   BMI 32.55 kg/m    Weight is 240 lbs 0 oz  Body mass index is 32.55 kg/m .           Psychiatric Mental Status Examination:   Appearance: awake, alert, untidy, appears recorded age   Attitude: somewhat cooperative   Eye Contact: poor, often covering eyes   Mood:  \"agitated\" irritable   Affect: mood congruent and full range  Speech:  clear, coherent and normal prosody  Language: fluent in English  Psychomotor Behavior:  no evidence of tardive dyskinesia, dystonia, or tics  Gait/Station: normal  Thought Process:  linear, logical, goal oriented  Associations:  no loose associations  Thought Content:  Denying SI/HI, denying current AVH; no evidence of psychotic thinking  Insight:  partial  Judgement: fair  Oriented to:  time, person, and place  Attention Span and Concentration:  intact  Recent and Remote Memory:  intact  Fund of Knowledge: appropriate         Physical Exam:   Please refer to physical exam completed by ED provider, Tian Esquivel MD, on 6/12/25 as below. I agree with the findings and assessment and have no additional findings to add at this time with exception that psychiatric findings now above in MSE.   Physical Exam  Constitutional:       General: He is not in acute distress.     Appearance: Normal appearance. He is not toxic-appearing.   HENT:      Head: Atraumatic.   Eyes:      General: No scleral icterus.     Conjunctiva/sclera: Conjunctivae normal.   Cardiovascular:      Rate and Rhythm: Normal rate.      Heart sounds: Normal heart sounds.   Pulmonary:      Effort: Pulmonary " effort is normal. No respiratory distress.      Breath sounds: Normal breath sounds.   Abdominal:      Palpations: Abdomen is soft.      Tenderness: There is no abdominal tenderness.   Musculoskeletal:         General: No deformity.      Cervical back: Neck supple.   Skin:     General: Skin is warm.   Neurological:      General: No focal deficit present.      Mental Status: He is alert and oriented to person, place, and time.      Sensory: No sensory deficit.      Motor: No weakness.      Coordination: Coordination normal.   Psychiatric:         Mood and Affect: Mood is anxious.         Speech: Speech normal.         Behavior: Behavior is cooperative.         Thought Content: Thought content does not include homicidal or suicidal ideation.

## 2025-06-13 NOTE — CONSULTS
"Consult received for Vascular access care.  See LDA for details. For additional needs place \"Nursing to Consult for Vascular Access\" [id number] order in EPIC.  "

## 2025-06-13 NOTE — PLAN OF CARE
Goal Outcome Evaluation:    Problem: Alcohol Withdrawal  Goal: Alcohol Withdrawal Symptom Control  Outcome: Progressing     Patient has been withdrawn and isolative. Appeared flat and blunted. Patient endorsed anxiety 8/10. Denied depression, SI, and SIB. He endorsed auditory hallucinations of a dog barking.    Patient endorsed back pain 4/10; tylenol 650mg was administered.    MSSA at 4pm was 10; 10mg of valium was administered.  BP (!) 153/99 (BP Location: Left arm)   Pulse 71   Temp 97.3  F (36.3  C) (Temporal)   Resp 16   Ht 1.829 m (6')   Wt 108.9 kg (240 lb)   SpO2 96%   BMI 32.55 kg/m      MSSA at 8pm was 5.  BP (!) 144/102 (BP Location: Left arm, Patient Position: Sitting, Cuff Size: Adult Large)   Pulse 68   Temp 97.9  F (36.6  C) (Oral)   Resp 16   Ht 1.829 m (6')   Wt 108.9 kg (240 lb)   SpO2 99%   BMI 32.55 kg/m      Discharge plan: Patient is interested in going to an inpatient treatment center. He would like to speak with an LADC regarding his treatment options.    We'll continue to monitor.

## 2025-06-13 NOTE — PSYCH
Writer received call regarding patient requesting voluntary admission for alcohol detox. Recent labs/vitals reviewed. Elevated BP readings, elevated AST/ALT. Patient reported to be medically cleared by ER physician per from . Admission orders for alcohol detox placed.

## 2025-06-13 NOTE — PLAN OF CARE
S: The Patient voluntarily arrived at 0106 AM on June 13, 2025, in room 316-2 from [facility] ED, seeking detox from alcohol under the care of Dr. García.    Based on the information from the ED notes and the RN-to-RN report, the nursing assessment on the unit reveals the following:    Hemanth Hurd is a 29-year-old male with a history of alcohol use disorder accompanied by withdrawal symptoms, opioid use disorder (currently on Medication-Assisted Treatment), benzodiazepine use disorder, generalized anxiety disorder (EMILY), and drug-induced insomnia. He presents to the Emergency Department (ED) seeking alcohol detoxification.    The patient denies any history of withdrawal seizures or significant delirium tremens (DTs), but reports that he continues to consume nearly a liter of vodka daily. He was recently admitted to the 3A unit from June 6 to June 9 but relapsed after discharge, stating that he experienced hallucinations. He is not suicidal and has no history of seizures. The patient is seeking treatment resources.    LAB:  JASON: 0.09  COVID Symptoms: No  Utox: Positive for benzos  Magnesium: WNL  CMP: Abnormalities: CO2 20, Anion Gap 19, Glucose 103, ,   CBC: Abnormalities: Hematocrit 39.8  HCG: N/A      A: The Patient participated willingly in the admission process and safety search. He reported experiencing no suicidal thoughts or any other mental health-related symptoms. His only struggle is with poor sleeping patterns. The Patient lives with his mother and is currently unemployed. He has no history of mental health hospitalizations, but he has undergone residential treatment for substance abuse and Outpatient care. He began drinking at the age of 17, and his alcohol use became problematic at 17.            R: A  team of professionals( A psychiatrist, an internal medicine provider(PRN), and a licensed alcohol and drug counselor, LADC) will be responsible for the Patient's care. The primary nurse  will use the MSSA protocol tool to assess the Patient and administer Valium as needed. The Team will assist the Patient in developing coping skills, setting daily goals, and conducting 15-minute safety checks.

## 2025-06-13 NOTE — PLAN OF CARE
The writer informed the RN caring for Hemanth in the ED that she would request a report at 0045, and the RN acknowledged this.

## 2025-06-13 NOTE — PHARMACY-ADMISSION MEDICATION HISTORY
Admission Medication History Completed by Pharmacy    See [facility] Admission Navigator for allergy information, preferred outpatient pharmacy and prior to admission medications.     Medication History Sources:   Prescription fill history via [facility] [employer] report  Patient (3A bedside)     Pertinent Information or Changes Made to PTA Med List:  Reviewed PTA med list with pt who was a reliable historian, refills up to date.   Modafinil: Takes as needed, usually on work days.     Allergies reviewed with patient: no    Prior to Admission medications    Medication Sig Last Dose       Buprenorphine HCl-Naloxone HCl (SUBOXONE) 12-3 MG FILM per film Place 1 Film under the tongue 2 times daily.     Past Week       cloNIDine (CATAPRES) 0.1 MG tablet Take 1-2 tablets (0.1-0.2 mg) by mouth 2 times daily as needed (withdrawal symptoms, anxiety).     Past Month       folic acid (FOLVITE) 1 MG tablet Take 1 tablet (1 mg) by mouth daily.     Past Month       gabapentin (NEURONTIN) 300 MG capsule Take 3-4 capsules (900-1,200 mg) by mouth at bedtime.     Past Week       modafinil (PROVIGIL) 100 MG tablet Take 100 mg by mouth daily as needed (narcolepsy).     Past Month       QUEtiapine (SEROQUEL) 50 MG tablet Take 1 tablet (50 mg) by mouth at bedtime.     Past Week       sertraline (ZOLOFT) 100 MG tablet Take 1 tablet (100 mg) by mouth daily     Past Week       thiamine (B-1) 100 MG tablet Take 1 tablet (100 mg) by mouth daily.     Past Month       multivitamin w/minerals (THERA-VIT-M) tablet     Take 1 tablet by mouth daily.        naloxone (NARCAN) 4 MG/0.1ML nasal spray     4 mg once as needed for opioid reversal.          Date completed: 06/13/25    Medication history completed by:   Gloria Mcneal, Pharm.D., [facility]  Behavioral Health Inpatient Pharmacist  [facility] Emergency Department  Contact via [employer] or Epic Messaging

## 2025-06-13 NOTE — PLAN OF CARE
Problem: Alcohol Withdrawal  Goal: Alcohol Withdrawal Symptom Control  Outcome: Progressing     Problem: Sleep Disturbance  Goal: Adequate Sleep/Rest  Outcome: Progressing   Goal Outcome Evaluation:    Plan of Care Reviewed With: patient      The Patient's MSSA scores were 4 and 8. He received Ativan 2 mg and Trazodone during the night to manage symptoms of alcohol withdrawal and sleeping problems. The Team conducted safety checks every 15 minutes without any issues.

## 2025-06-13 NOTE — PROGRESS NOTES
06/13/25 0113   Patient Belongings   Did you bring any home meds/supplements to the hospital?  Yes - given to RN   Patient Belongings other (see comments)   Patient Belongings Put in Hospital Secure Location (Security or Locker, etc.) other (see comments)   Belongings Search Yes   Clothing Search Yes   Second Staff John MCCORMACK     Bin: shirt (2), shorts, shoes, folder, sweater, underwear (2)  Med room: vape, cellphone (2x - second phone brought up from ED)  Medication given to RN  Note: patient has no wallet  A               Admission:  I am responsible for any personal items that are not sent to the safe or pharmacy.  [facility] is not responsible for loss, theft or damage of any property in my possession.    Signature:  _________________________________ Date: _______  Time: _____                                              Staff Signature:  ____________________________ Date: ________  Time: _____      2nd Staff person, if patient is unable/unwilling to sign:    Signature: ________________________________ Date: ________  Time: _____     Discharge:  [facility] has returned all of my personal belongings:    Signature: _________________________________ Date: ________  Time: _____                                          Staff Signature:  ____________________________ Date: ________  Time: _____

## 2025-06-13 NOTE — TELEPHONE ENCOUNTER
S: [facility] , Provider Nirali calling at 10:08 PM with clinical on 29 year old/male presenting for alcohol detox.     B: Pt presents for ETOH detox.   Currently reports drinking 1L vodka daily for past week.    Patient reports last use was prior to admission.  Pt JASON: .09  Pt  denies hx of DT  Pt  denies hx of seizures. Last seizure: N/A  Pt endorsing the following symptoms of withdrawal: still under influence  MSSA Score: not yet scored    Pt denies acute mental health or medical concerns.   Pt denies other drug use: None Amount/frequency: N/A    Does Pt have a detox care plan in [facility]? No  Does pt present with specific needs, assistive devices, or exclusionary criteria? None  Is the patient ambulating, eating and drinking in the ED? Yes    A: Pt meets criteria to be presented for IP detox admission. Patient is voluntary    COVID Symptoms: No  If yes, COVID test required   Utox: Positive for benzos  Magnesium: WNL  CMP: Abnormalities: CO2 20, Anion Gap 19, Glucose 103, ,   CBC: Abnormalities: Hematocrit 39.8  HCG: N/A     R: Patient cleared and ready for behavioral bed placement: Yes    Pt is meeting criteria for presentation to 3A/CD    Does Patient need a Transfer Center request created? No, Pt is located within [facility] ED, [facility] ED, or [place] ED    10:14 PM Intake called [facility] to review for [facility]A/ [place]/ CD.    11:08 PM [facility] provider to cb.    11:17 PM Provider accepted pt to 3A/ [place]/ CD.    11:31 PM Report info exchanged, admit board updated, and pt placed in queue.    11:50 PM Indicia completed.

## 2025-06-13 NOTE — PLAN OF CARE
Problem: Alcohol Withdrawal  Goal: Alcohol Withdrawal Symptom Control  Outcome: Progressing   Goal Outcome Evaluation:    Plan of Care Reviewed With: patient      Patient has spent most of the day in bed.  He came out for lunch, more willing to talk and participate in assessments without prompting.  He is alert and oriented X 4 without SI, SIB.  He reports feeling tired and anxious.  Patient's affect is flat, he is calm and cooperative.  Vital signs are stable.  Patient hasn't been interested in discussing post detox options today.  Will continue to monitor withdrawal and assist with discharge planning when patient is more awake.

## 2025-06-13 NOTE — PROGRESS NOTES
[address]     Daily Encounter: Met with team, discussed patient progress, discharge plan and any impediments to discharge.    Pt shares he may be interested in attending treatment but has not made up his mind yet. Pt was discharge from  on 6/9/25 to his home and immediatly began drinking. Pt is somewhat irritable and not willing to provide much information with writer at this time. Pt was encouraged to complete ARTURO paperwork and safety plan.     Insurance: MA     Legal Status:  Voluntary        SUDs Assessment Status: will need if pt wants placement.      ROIs on file: None at this time.      Living Situation: Lives with his mother in [place]     Current Providers, Supports & Collateral:  PCP: Hiram Horner     Current Plan/Referral Status: Unknown at this time.      Safety Plan Status: Not yet started; patient unable to participate at this time      HAIDER Noriega  [facility] - Adult Inpatient Addiction Psychiatry Unit

## 2025-06-13 NOTE — PLAN OF CARE
Behavioral Team Discussion: (6/13/2025)    Continued Stay Criteria/Rationale: Patient admitted for Chemical Use Issues.  Plan: The following services will be provided to the patient; psychiatric assessment, medication management, therapeutic milieu, individual and group support, and skills groups.   Participants: 3A Provider: Dr. Corinna García MD; 3A RN: Kei Nicole RN; 3A CM's: HAIDER Noriega.  Summary/Recommendation: Providers will assess today for treatment recommendations, discharge planning, and aftercare plans. CM will meet with pt for discharge planning.   Medical/Physical: No history of withdrawal seizures or significant Dts.   Precautions:   Behavioral Orders   Procedures    Code 1 - Restrict to Unit    Routine Programming     As clinically indicated    Status 15     Every 15 minutes.    Withdrawal precautions     Rationale for change in precautions or plan: N/A  Progress: Initial.    ASAM Dimension Scale Ratings:  Dimension 1: 3 Client tolerates and melyssa with withdrawal discomfort poorly. Client has severe intoxication, such that the client endangers self or others, or intoxication has not abated with less intensive levels of services. Client displays severe signs and symptoms; or risk of severe, but manageable withdrawal; or withdrawal worsening despite detox at less intensive level.  Dimension 2: 1 Client tolerates and melyssa with physical discomfort and is able to get the services that the client needs.  Dimension 3: 2 Client has difficulty with impulse control and lacks coping skills. Client has thoughts of suicide or harm to others without means; however, the thoughts may interfere with participation in some treatment activities. Client has difficulty functioning in significant life areas. Client has moderate symptoms of emotional, behavioral, or cognitive problems. Client is able to participate in most treatment activities.  Dimension 4: 3 Client displays inconsistent compliance, minimal  awareness of either the client's addiction or mental disorder, and is minimally cooperative.  Dimension 5: 4 No awareness of the negative impact of mental health problems or substance abuse. No coping skills to arrest mental health or addiction illnesses, or prevent relapse.  Dimension 6: 4 Client has (A) Chronically antagonistic significant other, living environment, family, peer group or long-term criminal justice involvement that is harmful to recovery or treatment progress, or (B) Client has an actively antagonistic significant other, family, work, or living environment with immediate threat to the client's safety and well-being.

## 2025-06-13 NOTE — CONSULTS
Brief Medicine Note:  Circumstances of recent discharge and re-admittance noted. Please refer to recent medicine consult in chart dated 06/07/25, by Hiram Bucio PA-C.     Diagnoses:  # Etoh use disorder and sz withdrawal ppx    # Recurrent tachycardia and elevated BPs   # Leukocytosis     Vital signs are currently stable. Labs at this admission are unremarkable.     No medical intervention indicated; medicine will sign off at this time. Thank you for the opportunity to be involved in this patient's care. Please notify on-call FABIANA if any intercurrent medical issues arise.     Ranjana Ferraro PA-C  Hospital Medicine F[facility]IANA  [facility]  Securely message with [employer] (more info)  Text page via [facility] Paging/Directory

## 2025-06-13 NOTE — DISCHARGE INSTRUCTIONS
Behavioral Discharge Planning and Instructions  THANK YOU FOR CHOOSING [facility]  3A  [phone]    Summary: You were admitted to Station 3A on 6/13/2025 for detoxification from alcohol.  A medical exam was performed that included lab work. You have met with a [facility] Counselor and opted to complete a ARTURO assessment and return home.  Please take care and make your recovery a daily priority, Hemanth!  It was a pleasure working with you and the entire treatment team here wishes you the very best in your recovery!     Recommendation:  Residential or Intensive Outpatient treatment    Referrals made:   [facility] program - [place] [address] [place], [place] [address]  Phone [phone]   Fax [phone]      [address] [address]  [email or web address]  Office: [phone]  Fax: [phone]     [facility] [facility]  Access Team Phone [phone]  Fax [phone]   [email or web address]    Main Diagnoses:    303.90 (F10.20) Alcohol Use Disorder Moderate    Major Treatments, Procedures and Findings:  You were treated for alcohol detoxification using valium.  You have met with a [facility] counselor to develop a treatment plan for discharge. You had labs drawn and those results were reviewed with you. Please take a copy of your lab work with you to your next primary care provider appointment.    Symptoms to Report:  If you experience more anxiety, confusion, sleeplessness, deep sadness or thoughts of suicide, notify your treatment team or notify your primary care provider. IF ANY OF THE SYMPTOMS YOU ARE EXPERIENCING ARE A MEDICAL EMERGENCY CALL 911 IMMEDIATELY.     Lifestyle Adjustment: Adjust your lifestyle to get enough sleep, relaxation, exercise and good nutrition. Continue to develop healthy coping skills to decrease stress and promote a sober living environment. Do not use mood altering substances including  alcohol, illegal drugs or addictive medications other than what is currently prescribed.     Disposition: Discharge to home with plan to go to treatment    Facts about COVID19 at www.cdc.gov/COVID19 and www.[place].gov/covid19    Keeping hands clean is one of the most important steps we can take to avoid getting sick and spreading germs to others.  Please wash your hands frequently and lather with soap for at least 20 seconds!    Follow-up Appointment:   Per patient    Recovery apps for your phone for educational purposes and to locate in person and zoom recovery meetings  Everything AA -  maximino is a great resource  12 Step Toolkit - educational purpose learning about the 12 steps to recovery  [employer] Cloud - meeting maximino  AA  - meeting maximino  Meeting guide - meeting maximino  Quick NA meeting - meeting maximino  Adiel- has various apps    Patient Navigation Hub  [facility] Navigators work to be your point-of-contact for trustworthy and compassionate care from Inpatient services to [facility] Programmatic Care. We will provide resources and communication to help guide you into programmatic care. Ultimately, our goal is to be the one-stop-shop of communication, coordination, and support for your journey to programmatic care.  Phone: [phone]    Resources:  AA/NA meetings have returned to in-person or a hybrid combination of zoom/in-person therefore please check online to verify*  Need Support Now? If you or someone you know is struggling or in crisis, help is available. Call or text 456 or chat 98PaeDaeline.org  AA meetings search for them at: https://aa-intergroup.org (worldwide meeting listings)  AA meetings for [place] area can be found online at: https://a[place].org (click local online meetings listings)  NA meetings for [place] area can be found online at: https://www.nam[place].org  (click find a meeting)  AA and NA Sponsors are excellent resources for support and you can find one at any support group  "meeting.   Alcoholics Anonymous (https://aa.org/): for information 24 hours/day  AA Intergroup service office in [place] (http://www.aast[place].org/) 304.289.7981  AA Intergroup service office in [facility]: [phone]. (http://www.a[place].org/)  Narcotics Anonymous (www.nam[place].org) (123) 120-8002  https://aafairviewriverside.org/meetings  SMART Recovery - self management for addiction recovery:  www.smartrecovery.org  Pathways ~ A Health Crisis Resource & Support Center:  680.721.9392.  https://prescribetoprevent.org/patient-education/videos/  http://www.DermaGenreduction.org  Crisis Text Line  Text 135310  You will be connected with a trained live crisis counselor to provide support. Por espanol, texto  PATEL a 759351 o texto a 442-AYUDAME en WhatsApp  [place] Hope Line  1.918.SUICIDE [5739824]  [facility] [phone]  Support Group:  AA/NA and Sponsor/support.  Fast Tracker  Linking people to mental health and substance use disorder resources  NetDocuments.org   [facility] Warm Line  Peer to peer support  Monday thru Saturday, 12 pm to 10 pm  562.190.3092 or 6.791.305.6476  Text \"Support\" to 54996  [employer] [place] [employer] on Mental Illness (MYRTLE)  340.260.2031 or 1.888.MYRTLE.HELPS  Alcoholics Anonymous (www.alcoholics-anonymous.org): Check your phone book for your local chapter.  Suicide Awareness Voices of Education (SAVE) (www.save.org): 615-990-YWAH (9358)  National Suicide Prevention Line (www.mentalhealthmn.org): 621-111-GBTX (3985)  Mental Health Consumer/Survivor Network of [place] (www.mhcsn.net): 244.573.9563 or 461-460-2267  Mental Health Association of [place] (www.mentalhealth.org): 646.434.2504 or 610-199-8342   Substance Abuse and Mental Health Services (www.samhsa.gov)  [place] Opioid Prevention Coalition: www.opioidcoalition.org    [place] Recovery Connection (MRC)  MRC connects people seeking recovery to resources that help foster and sustain " long-term recovery.  Whether you are seeking resources for treatment, transportation, housing, job training, education, health care or other pathways to recovery, [facility] is a great place to start.  158.368.4188.  www.[facility].org    Great Pod casts for nutrition and wellness  Listen on Apple Podcasts  Dishing Up Nutrition   [employer]   Nutrition       Understand the connection between what you eat and how you feel. Hosted by licensed nutritionists and dietitians from [employer] we share practical, real-life solutions for healthier living through nutrition.     General Medication Instructions:   See your medication sheet(s) for instructions.   Take all medications as prescribed.  Make no changes unless your primary care provider suggests them.   Go to all your primary care provider visits.  Be sure to have all your required lab tests. This way, your medicines can be refilled on time.  Do not use any forms of alcohol.    Please Note: If you have any questions at anytime after you discharged please call [facility] detox unit 3A at [phone].    Here are a list of additional numbers you can call if you are wanting to resume services through [facility]:  [facility] Assessment Intake: [phone]  [facility] Adult [facility]O Intensive Outpatient  call: [phone]  [facility] Admissions [phone]    Recovery Clinic call: [phone]  [facility]: [phone]  Medical Records call: [phone]  Billing Department call: [phone]    Please remember to take all of your behavioral discharge planning and lab paperwork to any follow up appointments, it contains your lab results, diagnosis, medication list and discharge recommendations.      THANK YOU FOR CHOOSING [facility]

## 2025-06-14 VITALS
RESPIRATION RATE: 16 BRPM | WEIGHT: 240 LBS | TEMPERATURE: 97.1 F | HEIGHT: 72 IN | DIASTOLIC BLOOD PRESSURE: 100 MMHG | BODY MASS INDEX: 32.51 KG/M2 | SYSTOLIC BLOOD PRESSURE: 141 MMHG | OXYGEN SATURATION: 95 % | HEART RATE: 64 BPM

## 2025-06-14 PROCEDURE — 250N000013 HC RX MED GY IP 250 OP 250 PS 637: Performed by: STUDENT IN AN ORGANIZED HEALTH CARE EDUCATION/TRAINING PROGRAM

## 2025-06-14 PROCEDURE — 250N000013 HC RX MED GY IP 250 OP 250 PS 637: Performed by: PSYCHIATRY & NEUROLOGY

## 2025-06-14 PROCEDURE — 99239 HOSP IP/OBS DSCHRG MGMT >30: CPT | Performed by: PSYCHIATRY & NEUROLOGY

## 2025-06-14 PROCEDURE — 250N000012 HC RX MED GY IP 250 OP 636 PS 637: Performed by: PSYCHIATRY & NEUROLOGY

## 2025-06-14 RX ORDER — QUETIAPINE FUMARATE 50 MG/1
50 TABLET, FILM COATED ORAL AT BEDTIME
Qty: 30 TABLET | Refills: 1 | Status: SHIPPED | OUTPATIENT
Start: 2025-06-14

## 2025-06-14 RX ORDER — MULTIPLE VITAMINS W/ MINERALS TAB 9MG-400MCG
1 TAB ORAL DAILY
Qty: 30 TABLET | Refills: 1 | Status: SHIPPED | OUTPATIENT
Start: 2025-06-14

## 2025-06-14 RX ORDER — SERTRALINE HYDROCHLORIDE 100 MG/1
100 TABLET, FILM COATED ORAL DAILY
Qty: 30 TABLET | Refills: 1 | Status: SHIPPED | OUTPATIENT
Start: 2025-06-14

## 2025-06-14 RX ORDER — DISULFIRAM 250 MG/1
250 TABLET ORAL DAILY
Qty: 30 TABLET | Refills: 3 | Status: SHIPPED | OUTPATIENT
Start: 2025-06-14

## 2025-06-14 RX ORDER — FOLIC ACID 1 MG/1
1 TABLET ORAL DAILY
Qty: 30 TABLET | Refills: 1 | Status: SHIPPED | OUTPATIENT
Start: 2025-06-14

## 2025-06-14 RX ORDER — CLONIDINE HYDROCHLORIDE 0.1 MG/1
.1-.2 TABLET ORAL 2 TIMES DAILY PRN
Qty: 120 TABLET | Refills: 1 | Status: SHIPPED | OUTPATIENT
Start: 2025-06-14

## 2025-06-14 RX ORDER — DISULFIRAM 250 MG/1
250 TABLET ORAL DAILY
Status: DISCONTINUED | OUTPATIENT
Start: 2025-06-14 | End: 2025-06-14 | Stop reason: HOSPADM

## 2025-06-14 RX ORDER — LANOLIN ALCOHOL/MO/W.PET/CERES
100 CREAM (GRAM) TOPICAL DAILY
Qty: 30 TABLET | Refills: 0 | Status: SHIPPED | OUTPATIENT
Start: 2025-06-14

## 2025-06-14 RX ADMIN — DISULFIRAM 250 MG: 250 TABLET ORAL at 16:14

## 2025-06-14 RX ADMIN — BUPRENORPHINE AND NALOXONE 1 FILM: 12; 3 FILM, SOLUBLE BUCCAL; SUBLINGUAL at 08:03

## 2025-06-14 RX ADMIN — Medication 1 TABLET: at 08:03

## 2025-06-14 RX ADMIN — NICOTINE POLACRILEX 8 MG: 4 GUM, CHEWING BUCCAL at 13:58

## 2025-06-14 RX ADMIN — SERTRALINE HYDROCHLORIDE 100 MG: 100 TABLET ORAL at 08:03

## 2025-06-14 RX ADMIN — THIAMINE HCL TAB 100 MG 100 MG: 100 TAB at 08:03

## 2025-06-14 RX ADMIN — FOLIC ACID 1 MG: 1 TABLET ORAL at 08:03

## 2025-06-14 ASSESSMENT — ACTIVITIES OF DAILY LIVING (ADL)
ADLS_ACUITY_SCORE: 19
HYGIENE/GROOMING: INDEPENDENT
DRESS: INDEPENDENT
ADLS_ACUITY_SCORE: 19
ORAL_HYGIENE: INDEPENDENT

## 2025-06-14 ASSESSMENT — PATIENT HEALTH QUESTIONNAIRE - PHQ9: SUM OF ALL RESPONSES TO PHQ QUESTIONS 1-9: 16

## 2025-06-14 ASSESSMENT — ANXIETY QUESTIONNAIRES
GAD7 TOTAL SCORE: 15
IF YOU CHECKED OFF ANY PROBLEMS ON THIS QUESTIONNAIRE, HOW DIFFICULT HAVE THESE PROBLEMS MADE IT FOR YOU TO DO YOUR WORK, TAKE CARE OF THINGS AT HOME, OR GET ALONG WITH OTHER PEOPLE: SOMEWHAT DIFFICULT
4. TROUBLE RELAXING: NEARLY EVERY DAY
GAD7 TOTAL SCORE: 15
1. FEELING NERVOUS, ANXIOUS, OR ON EDGE: MORE THAN HALF THE DAYS
6. BECOMING EASILY ANNOYED OR IRRITABLE: NEARLY EVERY DAY
7. FEELING AFRAID AS IF SOMETHING AWFUL MIGHT HAPPEN: SEVERAL DAYS
5. BEING SO RESTLESS THAT IT IS HARD TO SIT STILL: MORE THAN HALF THE DAYS
3. WORRYING TOO MUCH ABOUT DIFFERENT THINGS: MORE THAN HALF THE DAYS
2. NOT BEING ABLE TO STOP OR CONTROL WORRYING: MORE THAN HALF THE DAYS

## 2025-06-14 NOTE — PLAN OF CARE
Pt was monitored for  alcohol withdrawals but did not get any valium this shift as he denied symptoms. Slept well and only came out for snacks.   Last rec valium on the evening shift at 1600    MSSA:4/2    Problem: Alcohol Withdrawal  Goal: Alcohol Withdrawal Symptom Control  Outcome: Progressing   Goal Outcome Evaluation:

## 2025-06-14 NOTE — PROGRESS NOTES
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 mental health problems. Client has few coping skills and rarely applies coping skills.  Summary to support rating:  Hemanth has been unable to maintain sobriety after being released from substance abuse treatment or another protective setting and multiple experiences of having received treatment for substance abuse followed by a return to mood-altering drug abuse. He lacks insight into his personal relapse process along with early warning signs and triggers or applies known coping skills inconsistently.     Dimension 6: 4 Client has (A) Chronically antagonistic significant other, living environment, family, peer group or long-term criminal justice involvement that is harmful to recovery or treatment progress, or (B) Client has an actively antagonistic significant other, family, work, or living environment with immediate threat to the client's safety and well-being.  Summary to support rating:  Hemanth is currently living with his family in [place]. He notes that his family drinks. He reports that he is currently unemployed. He reports that he lacks a daily structure and meaningful activities.  Hemanth lives in an environment in which there is a high risk for relapse, is experiencing significant social isolation and withdrawal from " social life, is unemployed due to the negative effects of addictive behavior on work performance and attendance, and expresses relationship stress as a reason for substance use.      5. Hemanth's identified no culturally specific needs in treatment.     6. Recommendations for treatment focus:   Alcohol / Substance Use - relapse prevention skills.     7.  Interactive Complexity: No    8. Safety Plan:    Enrique Safety Plan      Creation Date: 6/14/25       Step 1: Warning signs:    Warning Signs    hiding use    irritability      Step 2: Internal coping strategies - Things I can do to take my mind off my problems without contacting another person:    Strategies    use the maximino I have    play with dogs      Step 3: People and social settings that provide distraction:    Name Contact Information    Bernie Duran        Places    Go outside      Step 4: People whom I can ask for help during a crisis:    Name Contact Information    Sunny Duran       Step 5: Professionals or agencies I can contact during a crisis:    Clinician/Agency Name Phone Emergency Contact    [facility] Crisis [facility] [phone]       Suicide Prevention Lifeline Phone: Call or Text 988  Crisis Text Line: Text HOME to 616504     Step 6: Making the environment safer (plan for lethal means safety):   988 Suicide & Crisis Lifeline: Call or text 9-8-8  Abuse Response Services (ARS) 24/7 Crisis Line: 169.392.8963  Free medical and legal advocacy and support for victims/survivors of sexual violence and exploitation.   [place] domestic violence/shelter: 803.891.7225  Crisis Text Line: Text [place] to 428911  National Youth Crisis Hotline: 1-653.761.4576  [facility] (domestic violence/LGBTQ+): 318.573.1290 (option 3)  Poison Control: 1-328.446.1902  [facility] (women and sexually exploited youth): 230.473.2609  [facility] [employer] (LGBTQ+): 1-394.407.5442 or text START to 006085  Luis Angel:  310-998-3481  [facility] Crisis Text Line (Veterans and families): Text to 964917  [employer] [facility] domestic violence shelter hotline: 587.494.2060     Optional: What is most important to me and worth living for?:      Enrique Safety Plan. Ana Lilia Dow and Juan Briones. Used with permission of the authors.          DAANES Assessment ID: [id number]    Provider Name/ Credentials:  ELIZABETH Flynn, [facility] 6/14/2025 2:18 PM

## 2025-06-14 NOTE — PLAN OF CARE
Problem: Adult Behavioral Health Plan of Care  Goal: Plan of Care Review  Recent Flowsheet Documentation  Taken 6/14/2025 0800 by Kei Nicole RN  Patient Agreement with Plan of Care: agrees   Goal Outcome Evaluation:    Plan of Care Reviewed With: patient        Patient is alert and oriented X 4 with no SI, no SIB and currently, no hallucinations.  He is eating well and drinking fluids.  His affect is blunted and he is calm.  Patient will be out of detox this evening and has requested discharge if detox is complete.  He is having minimal withdrawal symptoms.  All discharge instructions, labs and medications have been reviewed with patient.  He has met with case management, completed an assessment and it has been sent to three treatment programs.  Patient will follow up from home.  Patient is being sent home with antabuse and will be offered a first dose prior to leaving this pm.  Will continue to monitor until detox complete.

## 2025-06-14 NOTE — PLAN OF CARE
[facility]     Case Management Encounter:   Met with pt and completed ARTURO assessment.    Referrals sent to [facility], [facility], and [facility]    Insurance:   [place] / [place]: [place] / Product Type: Medicaid /     Legal Status:   Voluntary     SUDs Assessment Status:   Completed      ROIs on file:  [facility], [facility], and [facility]     Living Situation:   With mom in [place]      Current Providers, Supports & Collateral:    Hiram Horner PA-C       Safety Plan Status:  Completed    Current Plan/Referral Status:   [facility]  [address] [place], [place] [address]  Phone [phone]   Fax [phone]      [facility]  [address] [place] [address]  [email or web address]  Office: [phone]  Fax: [phone]     [facility]  Access Team Phone [phone]  Fax [phone]   [email or web address]     RN updated.    Sheba Broussard, LICSW, [facility] - Adult Inpatient Addiction Psychiatry Unit

## 2025-06-14 NOTE — DISCHARGE SUMMARY
Psychiatric Discharge Summary    Hemanth Hurd MRN# [id number]   Age: 29 year old YOB: 1995     Date of Admission:  6/12/2025  Date of Discharge:  6/14/2025  5:04 PM  Admitting Physician:  Corinna García MD  Discharge Physician:  Oj Ramirez MD          Events Leading to Hospitalization:      Hemanth is a 29 year old male with history of polysubstance use, depression, anxiety and insomnia who presented to ED seeking detox from alcohol.     Chart review completed including ED notes from this encounter; recent 3A admission from 6/6-6/9, pt discharged home, declined AUD MAT; ED visit on 4/7 for alcohol use and hallucinations; no psychiatric admissions noted.      Per ED physician note: Hemanth Hurd is a 29 year old male with history of alcohol use disorder with withdrawal symptoms, opoid use disorder on MAT, benzodiazepine use disorder, EMILY, drug-induced insomnia, who presents to the ED seeking alcohol detox.  Patient denies history of withdrawal seizures or significant DTs but does note that he is continue to drink vodka nearly a liter a day.      Upon interview: Patient confirms information above, reports he discharged from this unit on the night that he immediately relapsed the same day, has been drinking a liter of vodka per day, denies any other substance use outside of nicotine.  He reports he has been taking his medications since discharge.  He has tolerance to alcohol, drinking more than intended, difficulty cutting down and withdrawal symptoms when he stopped drinking including experiencing some hallucinations after he left here which is what prompted his relapse, also reports currently feeling shaky, sweaty and having some irritability.  He also describes his mood as anxious.  He denies any history of seizures.  Denies any current hallucinations, was having auditory hallucinations last night, denies anything today.  Denies having any SI or HI.  He reports his goals for  hospitalization are to complete detox, he is not sure about going to CD treatment as he reports that right now he currently wants to focus on just feeling better, he is open to considering treatment will discuss options with case management staff.      See Admission note by Corinna García MD for additional details.          Diagnoses:     Alcohol use disorder, severe, dependence with withdrawal with complication including hx of hallucinations   Opioid use disorder, in remission, on MAT  Nicotine dependence with withdrawal  Generalized anxiety disorder  Major depressive disorder, recurrent, mild  Drug-induced insomnia    Clinically Significant Risk Factors              # Anion Gap Metabolic Acidosis: Highest Anion Gap = 19 mmol/L in last 2 days, will monitor and treat as appropriate                 # Obesity: Estimated body mass index is 32.55 kg/m  as calculated from the following:    Height as of this encounter: 1.829 m (6').    Weight as of this encounter: 108.9 kg (240 lb)., PRESENT ON ADMISSION     # Financial/Environmental Concerns:                  Labs:        Lab Results   Component Value Date     06/13/2025     01/31/2017    Lab Results   Component Value Date    CHLORIDE 101 06/13/2025    CHLORIDE 106 01/31/2017    Lab Results   Component Value Date    BUN 12.9 06/13/2025    BUN 6 01/31/2017      Lab Results   Component Value Date    POTASSIUM 3.7 06/13/2025    POTASSIUM 3.7 01/31/2017    Lab Results   Component Value Date    CO2 25 06/13/2025    CO2 26 01/31/2017    Lab Results   Component Value Date    CR 0.85 06/13/2025    CR 0.86 01/31/2017          Lab Results   Component Value Date    WBC 7.4 06/12/2025    HGB 13.6 06/12/2025    HCT 39.8 (L) 06/12/2025    MCV 87 06/12/2025     06/12/2025     Lab Results   Component Value Date    AST 83 (H) 06/13/2025     (H) 06/13/2025     (H) 06/12/2025    ALKPHOS 112 06/13/2025    BILITOTAL 0.3 06/13/2025     Lab Results    Component Value Date    TSH 0.83 06/12/2025            Consults:   Consultation during this admission received from internal medicine.  No medical intervention was indicated.           Hospital Course:   Hemanth Hurd was admitted to [facility] with attending Corinna García MD as a voluntary patient. The patient was placed under status 15 (15 minute checks) to ensure patient safety.   MSSA protocol was initiated due to the patient's history of alcohol abuse and concern for withdrawal symptoms.  CBC, BMP and utox obtained.    All outpatient medications were continued. Antabuse was started after patient completed detox.     Hemanth Hurd did participate in groups and was visible in the milieu.     The patient's symptoms of withdrawal improved.     Hemanth Hurd was released to home and then to  treatment. At the time of discharge Hemanth Hurd was determined to not be a danger to himself or others. At the current time of discharge, the patient does not meet criteria for involuntary hospitalization. On the day of discharge, the patient reports that they do not have suicidal or homicidal ideation and would never hurt themselves or others. Steps taken to minimize risk include: assessing patient s behavior and thought process daily during hospital stay, discharging patient with adequate plan for follow up for mental and physical health and discussing safety plan of returning to the hospital should the patient ever have thoughts of harming themselves or others. Therefore, based on all available evidence including the factors cited above, the patient does not appear to be at imminent risk for self-harm, and is appropriate for outpatient level of care.           Discharge Medications:     Current Discharge Medication List        START taking these medications    Details   disulfiram (ANTABUSE) 250 MG tablet Take 1 tablet (250 mg) by mouth daily.  Qty: 30 tablet, Refills: 3    Associated  Diagnoses: Alcohol use disorder, severe, in early remission (H)           CONTINUE these medications which have CHANGED    Details   cloNIDine (CATAPRES) 0.1 MG tablet Take 1-2 tablets (0.1-0.2 mg) by mouth 2 times daily as needed (withdrawal symptoms, anxiety).  Qty: 120 tablet, Refills: 1    Associated Diagnoses: EMILY (generalized anxiety disorder); Current moderate episode of major depressive disorder, unspecified whether recurrent (H)      folic acid (FOLVITE) 1 MG tablet Take 1 tablet (1 mg) by mouth daily.  Qty: 30 tablet, Refills: 1    Associated Diagnoses: Alcohol use disorder      multivitamin w/minerals (THERA-VIT-M) tablet Take 1 tablet by mouth daily.  Qty: 30 tablet, Refills: 1    Associated Diagnoses: Alcohol use disorder      QUEtiapine (SEROQUEL) 50 MG tablet Take 1 tablet (50 mg) by mouth at bedtime.  Qty: 30 tablet, Refills: 1    Associated Diagnoses: Opioid use disorder, severe, dependence (H)      sertraline (ZOLOFT) 100 MG tablet Take 1 tablet (100 mg) by mouth daily.  Qty: 30 tablet, Refills: 1    Associated Diagnoses: EMILY (generalized anxiety disorder); Current moderate episode of major depressive disorder, unspecified whether recurrent (H)      thiamine (B-1) 100 MG tablet Take 1 tablet (100 mg) by mouth daily.  Qty: 30 tablet, Refills: 0    Associated Diagnoses: Alcohol use disorder           CONTINUE these medications which have NOT CHANGED    Details   Buprenorphine HCl-Naloxone HCl (SUBOXONE) 12-3 MG FILM per film Place 1 Film under the tongue 2 times daily.  Qty: 60 Film, Refills: 1    Comments: Pt to use GoodRx  Associated Diagnoses: Opioid use disorder, severe, dependence (H)      gabapentin (NEURONTIN) 300 MG capsule Take 3-4 capsules (900-1,200 mg) by mouth at bedtime.  Qty: 270 capsule, Refills: 3    Associated Diagnoses: Current moderate episode of major depressive disorder, unspecified whether recurrent (H); EMILY (generalized anxiety disorder)      modafinil (PROVIGIL) 100 MG tablet  Take 100 mg by mouth daily as needed (narcolepsy).      naloxone (NARCAN) 4 MG/0.1ML nasal spray 4 mg once as needed for opioid reversal.                  Psychiatric Examination:   Appearance:  awake, alert and adequately groomed  Attitude:  cooperative  Eye Contact:  good  Mood:  good  Affect:  mood congruent  Speech:  clear, coherent  Psychomotor Behavior:  no evidence of tardive dyskinesia, dystonia, or tics  Thought Process:  goal oriented  Associations:  no loose associations  Thought Content:  no evidence of suicidal ideation or homicidal ideation and no evidence of psychotic thought  Insight:  fair  Judgment:  fair  Oriented to:  time, person, and place  Attention Span and Concentration:  intact  Recent and Remote Memory:  fair  Language: Able to read and write  Fund of Knowledge: appropriate  Muscle Strength and Tone: normal  Gait and Station: Normal         Discharge Plan:   Continue medications as above.     Recommendation:  Residential or Intensive Outpatient treatment     Referrals made:   [facility] - [place] [address] [place], [place] [address]  Phone [phone]   Fax [phone]      [address] [address]  [email or web address]  Office: [phone]  Fax: [phone]     \[facility] [place] [facility]\""/[facility]  Access Team Phone [phone]  Fax [phone]   [email or web address]    Attestation:    The patient was seen and evaluated by me. I spent more than 30 minutes on discharge day activities. Oj Ramirez MD

## 2025-06-14 NOTE — PLAN OF CARE
Goal Outcome Evaluation:    Problem: Alcohol Withdrawal  Goal: Alcohol Withdrawal Symptom Control  Outcome: Met     Patient was taken out of detox. He verbalized satisfaction with plan to discharge this shift. Discharge teaching and medication education provided by the previous nurse. Patient verbalized understanding. Patient denied depression, SI and SIB. Endorsed mild anxiety. Safety plan in place. Patient plans to follow-up with scheduled outpatient providers after discharge. Patient left the unit with discharge medications and belongings. Transported home by his mother.  BP (!) 141/100 (BP Location: Left arm)   Pulse 64   Temp 97.1  F (36.2  C) (Temporal)   Resp 16   Ht 1.829 m (6')   Wt 108.9 kg (240 lb)   SpO2 95%   BMI 32.55 kg/m

## 2025-06-17 ENCOUNTER — PATIENT OUTREACH (OUTPATIENT)
Dept: CARE COORDINATION | Facility: CLINIC | Age: 30
End: 2025-06-17
Payer: MEDICAID

## 2025-06-17 NOTE — PROGRESS NOTES
[facility] Contact  [facility]/Voicemail     Clinical Data: Post-Discharge Outreach     Outreach attempted x 2.  Left message on patient's voicemail, providing [facility]'s central phone number of [phone] ([phone]) for questions/concerns and/or to schedule an appt with an [facility] provider, if they do not have a PCP.     Writer did receive voicemail from patient who shared that he was doing good and working on getting into IOP. Writer called back and let patient know he could be connected with CC if needed.     Plan:  [facility] will do no further outreaches at this time.       Sheron Lomeli, SHANNON  [facility], [facility]    *Connected Care Resource Team does NOT follow patient ongoing. Referrals are identified based on internal discharge reports and the outreach is to ensure patient has an understanding of their discharge instructions.

## 2025-06-18 ENCOUNTER — TELEPHONE (OUTPATIENT)
Dept: BEHAVIORAL HEALTH | Facility: CLINIC | Age: 30
End: 2025-06-18
Payer: MEDICAID

## 2025-06-18 NOTE — TELEPHONE ENCOUNTER
2:57 PM Pt called intake requesting a copy of assessment completed on 3A.     Transferred to unit 3A for further assistance